# Patient Record
Sex: MALE | Race: WHITE | NOT HISPANIC OR LATINO | Employment: FULL TIME | URBAN - METROPOLITAN AREA
[De-identification: names, ages, dates, MRNs, and addresses within clinical notes are randomized per-mention and may not be internally consistent; named-entity substitution may affect disease eponyms.]

---

## 2018-04-30 ENCOUNTER — CLINICAL SUPPORT (OUTPATIENT)
Dept: URGENT CARE | Facility: CLINIC | Age: 43
End: 2018-04-30

## 2018-04-30 DIAGNOSIS — Z02.1 PRE-EMPLOYMENT EXAMINATION: Primary | ICD-10-CM

## 2018-04-30 PROCEDURE — 86580 TB INTRADERMAL TEST: CPT | Mod: S$GLB,,, | Performed by: EMERGENCY MEDICINE

## 2018-05-02 ENCOUNTER — CLINICAL SUPPORT (OUTPATIENT)
Dept: OCCUPATIONAL MEDICINE | Facility: CLINIC | Age: 43
End: 2018-05-02

## 2018-05-02 LAB
TB INDURATION - 48 HR READ: NORMAL MM
TB INDURATION - 72 HR READ: NORMAL MM
TB SKIN TEST - 48 HR READ: NEGATIVE
TB SKIN TEST - 72 HR READ: NORMAL

## 2018-12-11 ENCOUNTER — CLINICAL SUPPORT (OUTPATIENT)
Dept: URGENT CARE | Facility: CLINIC | Age: 43
End: 2018-12-11
Payer: COMMERCIAL

## 2018-12-11 DIAGNOSIS — Z11.1 SCREENING EXAMINATION FOR PULMONARY TUBERCULOSIS: Primary | ICD-10-CM

## 2018-12-11 PROCEDURE — 86580 TB INTRADERMAL TEST: CPT | Mod: S$GLB,,, | Performed by: PHYSICIAN ASSISTANT

## 2018-12-13 LAB
TB INDURATION - 48 HR READ: 0 MM
TB INDURATION - 72 HR READ: NORMAL MM
TB SKIN TEST - 48 HR READ: NEGATIVE
TB SKIN TEST - 72 HR READ: NORMAL

## 2019-06-08 ENCOUNTER — OFFICE VISIT (OUTPATIENT)
Dept: URGENT CARE | Facility: CLINIC | Age: 44
End: 2019-06-08
Payer: COMMERCIAL

## 2019-06-08 ENCOUNTER — CLINICAL SUPPORT (OUTPATIENT)
Dept: URGENT CARE | Facility: CLINIC | Age: 44
End: 2019-06-08

## 2019-06-08 VITALS
RESPIRATION RATE: 18 BRPM | OXYGEN SATURATION: 97 % | BODY MASS INDEX: 26.79 KG/M2 | DIASTOLIC BLOOD PRESSURE: 76 MMHG | HEIGHT: 76 IN | TEMPERATURE: 97 F | SYSTOLIC BLOOD PRESSURE: 115 MMHG | WEIGHT: 220 LBS | HEART RATE: 71 BPM

## 2019-06-08 DIAGNOSIS — J06.9 UPPER RESPIRATORY TRACT INFECTION, UNSPECIFIED TYPE: ICD-10-CM

## 2019-06-08 DIAGNOSIS — J30.89 ENVIRONMENTAL AND SEASONAL ALLERGIES: ICD-10-CM

## 2019-06-08 DIAGNOSIS — J32.9 RHINOSINUSITIS: Primary | ICD-10-CM

## 2019-06-08 DIAGNOSIS — R53.83 FATIGUE, UNSPECIFIED TYPE: Primary | ICD-10-CM

## 2019-06-08 DIAGNOSIS — R53.83 FATIGUE, UNSPECIFIED TYPE: ICD-10-CM

## 2019-06-08 PROCEDURE — 99214 PR OFFICE/OUTPT VISIT, EST, LEVL IV, 30-39 MIN: ICD-10-PCS | Mod: 25,S$GLB,, | Performed by: NURSE PRACTITIONER

## 2019-06-08 PROCEDURE — 96372 PR INJECTION,THERAP/PROPH/DIAG2ST, IM OR SUBCUT: ICD-10-PCS | Mod: S$GLB,,, | Performed by: FAMILY MEDICINE

## 2019-06-08 PROCEDURE — 96372 THER/PROPH/DIAG INJ SC/IM: CPT | Mod: S$GLB,,, | Performed by: NURSE PRACTITIONER

## 2019-06-08 PROCEDURE — 96372 PR INJECTION,THERAP/PROPH/DIAG2ST, IM OR SUBCUT: ICD-10-PCS | Mod: S$GLB,,, | Performed by: NURSE PRACTITIONER

## 2019-06-08 PROCEDURE — 96372 THER/PROPH/DIAG INJ SC/IM: CPT | Mod: S$GLB,,, | Performed by: FAMILY MEDICINE

## 2019-06-08 PROCEDURE — 99214 OFFICE O/P EST MOD 30 MIN: CPT | Mod: 25,S$GLB,, | Performed by: NURSE PRACTITIONER

## 2019-06-08 PROCEDURE — 3008F PR BODY MASS INDEX (BMI) DOCUMENTED: ICD-10-PCS | Mod: CPTII,S$GLB,, | Performed by: NURSE PRACTITIONER

## 2019-06-08 PROCEDURE — 3008F BODY MASS INDEX DOCD: CPT | Mod: CPTII,S$GLB,, | Performed by: NURSE PRACTITIONER

## 2019-06-08 RX ORDER — CYANOCOBALAMIN 1000 UG/ML
1000 INJECTION, SOLUTION INTRAMUSCULAR; SUBCUTANEOUS
Status: COMPLETED | OUTPATIENT
Start: 2019-06-08 | End: 2019-06-08

## 2019-06-08 RX ORDER — BETAMETHASONE SODIUM PHOSPHATE AND BETAMETHASONE ACETATE 3; 3 MG/ML; MG/ML
9 INJECTION, SUSPENSION INTRA-ARTICULAR; INTRALESIONAL; INTRAMUSCULAR; SOFT TISSUE
Status: COMPLETED | OUTPATIENT
Start: 2019-06-08 | End: 2019-06-08

## 2019-06-08 RX ORDER — LISINOPRIL 10 MG/1
TABLET ORAL
Refills: 2 | COMMUNITY
Start: 2019-05-02 | End: 2021-02-22

## 2019-06-08 RX ORDER — ROSUVASTATIN CALCIUM 10 MG/1
TABLET, COATED ORAL
Refills: 2 | COMMUNITY
Start: 2019-04-24 | End: 2021-02-22 | Stop reason: SDUPTHER

## 2019-06-08 RX ORDER — ESOMEPRAZOLE MAGNESIUM 40 MG/1
20 CAPSULE, DELAYED RELEASE ORAL
COMMUNITY
End: 2022-10-11

## 2019-06-08 RX ADMIN — BETAMETHASONE SODIUM PHOSPHATE AND BETAMETHASONE ACETATE 9 MG: 3; 3 INJECTION, SUSPENSION INTRA-ARTICULAR; INTRALESIONAL; INTRAMUSCULAR; SOFT TISSUE at 01:06

## 2019-06-08 RX ADMIN — CYANOCOBALAMIN 1000 MCG: 1000 INJECTION, SOLUTION INTRAMUSCULAR; SUBCUTANEOUS at 01:06

## 2019-06-08 NOTE — PROGRESS NOTES
"Subjective:       Patient ID: Alden Ledbetter is a 44 y.o. male.    Vitals:  height is 6' 4" (1.93 m) and weight is 99.8 kg (220 lb). His oral temperature is 97.2 °F (36.2 °C). His blood pressure is 115/76 and his pulse is 71. His respiration is 18 and oxygen saturation is 97%.     Chief Complaint: Sinus Problem    Patient complains of nasal congestion, ears clogged, postnasal drip since Thursday and feeling very low energy since that day.     Sinus Problem   This is a new problem. The current episode started in the past 7 days. The problem has been gradually worsening since onset. There has been no fever. He is experiencing no pain. Associated symptoms include congestion, a hoarse voice and a sore throat. Pertinent negatives include no chills, coughing, diaphoresis, ear pain, shortness of breath or sinus pressure. Treatments tried: flonase, zyrtec, tylenol. The treatment provided no relief.       Constitution: Positive for fatigue. Negative for chills, sweating and fever.   HENT: Positive for congestion, postnasal drip and sore throat. Negative for ear pain, sinus pain, sinus pressure and voice change.    Neck: Negative for painful lymph nodes.   Eyes: Negative for eye redness.   Respiratory: Negative for chest tightness, cough, sputum production, bloody sputum, COPD, shortness of breath, stridor, wheezing and asthma.    Gastrointestinal: Negative for nausea and vomiting.   Musculoskeletal: Negative for muscle ache.   Skin: Negative for rash.   Allergic/Immunologic: Negative for seasonal allergies and asthma.   Hematologic/Lymphatic: Negative for swollen lymph nodes.       Objective:      Physical Exam   Constitutional: He is oriented to person, place, and time. He appears well-developed and well-nourished. He is cooperative.  Non-toxic appearance. He does not have a sickly appearance. He does not appear ill. No distress.   HENT:   Head: Normocephalic and atraumatic.   Right Ear: Hearing, tympanic membrane, " external ear and ear canal normal.   Left Ear: Hearing, tympanic membrane, external ear and ear canal normal.   Nose: Mucosal edema present. No rhinorrhea or nasal deformity. No epistaxis. Right sinus exhibits no maxillary sinus tenderness and no frontal sinus tenderness. Left sinus exhibits no maxillary sinus tenderness and no frontal sinus tenderness.   Mouth/Throat: Uvula is midline and mucous membranes are normal. No trismus in the jaw. Normal dentition. No uvula swelling. Posterior oropharyngeal edema and posterior oropharyngeal erythema present. No oropharyngeal exudate. No tonsillar exudate.   Eyes: Conjunctivae and lids are normal. No scleral icterus.   Sclera clear bilat   Neck: Trachea normal, full passive range of motion without pain and phonation normal. Neck supple.   Cardiovascular: Normal rate, regular rhythm, normal heart sounds, intact distal pulses and normal pulses.   Pulmonary/Chest: Effort normal and breath sounds normal. No stridor. No respiratory distress. He has no decreased breath sounds. He has no wheezes. He has no rhonchi.   Abdominal: Soft. Normal appearance and bowel sounds are normal. He exhibits no distension. There is no tenderness.   Musculoskeletal: Normal range of motion. He exhibits no edema or deformity.        Lumbar back: He exhibits normal range of motion and no tenderness.   Lymphadenopathy:     He has cervical adenopathy.   Neurological: He is alert and oriented to person, place, and time. He exhibits normal muscle tone. Coordination normal.   Skin: Skin is warm, dry and intact. He is not diaphoretic. No pallor.   Psychiatric: He has a normal mood and affect. His speech is normal and behavior is normal. Judgment and thought content normal. Cognition and memory are normal.   Nursing note and vitals reviewed.      Assessment:       1. Rhinosinusitis    2. Upper respiratory tract infection, unspecified type    3. Environmental and seasonal allergies    4. Fatigue, unspecified  type        Plan:         Rhinosinusitis  -     betamethasone acetate-betamethasone sodium phosphate injection 9 mg    Upper respiratory tract infection, unspecified type    Environmental and seasonal allergies    Fatigue, unspecified type      Patient Instructions   Follow up with your doctor in a few days.  Return to the urgent care or go to the ER if symptoms get worse.    You received a steroid shot today - this can elevate your blood pressure, elevate your blood sugar, water weight gain, nervous energy, redness to the face and dimpling of the skin where the shot goes in.       TYLENOL EVERY 4 HOURS OR/AND MOTRIN 600MG EVERY 6 HOURS FOR THROAT PAIN/FEVER.    TAKE DAILY ANTIHISTAMINE WITH DECONGESTANT (CLARATIN D, ALLEGRA D, ZYRTEC D) FOR THE NEXT 7-10 DAYS    TAKE DAILY FLONASE AS DIRECTED FOR THE NEXT 7-10 DAYS FOR CONGESTION.    SEE HANDOUT ON SINUSITUS AND ALLERGIC RHINITIS.    IF SYMPTOMS FAIL TO IMPROVE IN THE NEXT 5-7 DAYS, OR IMPROVE AND THEN WORSEN, FOLLOW UP WITH A HEALTHCARE PROVIDER.        Sinusitis (No Antibiotics)    The sinuses are air-filled spaces within the bones of the face. They connect to the inside of the nose. Sinusitis is an inflammation of the tissue lining the sinus cavity. Sinus inflammation can occur during a cold. It can also be due to allergies to pollens and other particles in the air. It can cause symptoms such as sinus congestion, headache, sore throat, facial swelling and fullness. It may also cause a low-grade fever. No infection is present, and no antibiotic treatment is needed.  Home care  · Drink plenty of water, hot tea, and other liquids. This may help thin mucus. It also may promote sinus drainage.  · Heat may help soothe painful areas of the face. Use a towel soaked in hot water. Or,  the shower and direct the hot spray onto your face. Using a vaporizer along with a menthol rub at night may also help.   · An expectorant containing guaifenesin may help thin the  mucus and promote drainage from the sinuses.  · Over-the-counter decongestants may be used unless a similar medicine was prescribed. Nasal sprays work the fastest. Use one that contains phenylephrine or oxymetazoline. First blow the nose gently. Then use the spray. Do not use these medicines more often than directed on the label or symptoms may get worse. You may also use tablets containing pseudoephedrine. Avoid products that combine ingredients, because side effects may be increased. Read labels. You can also ask the pharmacist for help. (NOTE: Persons with high blood pressure should not use decongestants. They can raise blood pressure.)  · Over-the-counter antihistamines may help if allergies contributed to your sinusitis.    · Use acetaminophen or ibuprofen to control pain, unless another pain medicine was prescribed. (If you have chronic liver or kidney disease or ever had a stomach ulcer, talk with your doctor before using these medicines. Aspirin should never be used in anyone under 18 years of age who is ill with a fever. It may cause severe liver damage.)  · Use nasal rinses or irrigation as instructed by your health care provider.  · Don't smoke. This can worsen symptoms.  Follow-up care  Follow up with your healthcare provider or our staff if you are not improving within the next week.  When to seek medical advice  Call your healthcare provider if any of these occur:  · Green or yellow discharge from the nose or into the throat  · Facial pain or headache becoming more severe  · Stiff neck  · Unusual drowsiness or confusion  · Swelling of the forehead or eyelids  · Vision problems, including blurred or double vision  · Fever of 100.4ºF (38ºC) or higher, or as directed by your healthcare provider  · Seizure  · Breathing problems  · Symptoms not resolving within 10 days  Date Last Reviewed: 4/13/2015  © 2951-4243 [employer]. [address] [place] [address], [place] [address]. All rights reserved. This  information is not intended as a substitute for professional medical care. Always follow your healthcare professional's instructions.

## 2019-06-08 NOTE — PATIENT INSTRUCTIONS
Follow up with your doctor in a few days.  Return to the urgent care or go to the ER if symptoms get worse.    You received a steroid shot today - this can elevate your blood pressure, elevate your blood sugar, water weight gain, nervous energy, redness to the face and dimpling of the skin where the shot goes in.       TYLENOL EVERY 4 HOURS OR/AND MOTRIN 600MG EVERY 6 HOURS FOR THROAT PAIN/FEVER.    TAKE DAILY ANTIHISTAMINE WITH DECONGESTANT (CLARATIN D, ALLEGRA D, ZYRTEC D) FOR THE NEXT 7-10 DAYS    TAKE DAILY FLONASE AS DIRECTED FOR THE NEXT 7-10 DAYS FOR CONGESTION.    SEE HANDOUT ON SINUSITUS AND ALLERGIC RHINITIS.    IF SYMPTOMS FAIL TO IMPROVE IN THE NEXT 5-7 DAYS, OR IMPROVE AND THEN WORSEN, FOLLOW UP WITH A HEALTHCARE PROVIDER.        Sinusitis (No Antibiotics)    The sinuses are air-filled spaces within the bones of the face. They connect to the inside of the nose. Sinusitis is an inflammation of the tissue lining the sinus cavity. Sinus inflammation can occur during a cold. It can also be due to allergies to pollens and other particles in the air. It can cause symptoms such as sinus congestion, headache, sore throat, facial swelling and fullness. It may also cause a low-grade fever. No infection is present, and no antibiotic treatment is needed.  Home care  · Drink plenty of water, hot tea, and other liquids. This may help thin mucus. It also may promote sinus drainage.  · Heat may help soothe painful areas of the face. Use a towel soaked in hot water. Or,  the shower and direct the hot spray onto your face. Using a vaporizer along with a menthol rub at night may also help.   · An expectorant containing guaifenesin may help thin the mucus and promote drainage from the sinuses.  · Over-the-counter decongestants may be used unless a similar medicine was prescribed. Nasal sprays work the fastest. Use one that contains phenylephrine or oxymetazoline. First blow the nose gently. Then use the spray. Do  not use these medicines more often than directed on the label or symptoms may get worse. You may also use tablets containing pseudoephedrine. Avoid products that combine ingredients, because side effects may be increased. Read labels. You can also ask the pharmacist for help. (NOTE: Persons with high blood pressure should not use decongestants. They can raise blood pressure.)  · Over-the-counter antihistamines may help if allergies contributed to your sinusitis.    · Use acetaminophen or ibuprofen to control pain, unless another pain medicine was prescribed. (If you have chronic liver or kidney disease or ever had a stomach ulcer, talk with your doctor before using these medicines. Aspirin should never be used in anyone under 18 years of age who is ill with a fever. It may cause severe liver damage.)  · Use nasal rinses or irrigation as instructed by your health care provider.  · Don't smoke. This can worsen symptoms.  Follow-up care  Follow up with your healthcare provider or our staff if you are not improving within the next week.  When to seek medical advice  Call your healthcare provider if any of these occur:  · Green or yellow discharge from the nose or into the throat  · Facial pain or headache becoming more severe  · Stiff neck  · Unusual drowsiness or confusion  · Swelling of the forehead or eyelids  · Vision problems, including blurred or double vision  · Fever of 100.4ºF (38ºC) or higher, or as directed by your healthcare provider  · Seizure  · Breathing problems  · Symptoms not resolving within 10 days  Date Last Reviewed: 4/13/2015  © 9266-9050 [employer]. [address] [place] [address], [place] [address]. All rights reserved. This information is not intended as a substitute for professional medical care. Always follow your healthcare professional's instructions.

## 2019-06-11 ENCOUNTER — TELEPHONE (OUTPATIENT)
Dept: URGENT CARE | Facility: CLINIC | Age: 44
End: 2019-06-11

## 2019-10-01 ENCOUNTER — CLINICAL SUPPORT (OUTPATIENT)
Dept: URGENT CARE | Facility: CLINIC | Age: 44
End: 2019-10-01

## 2019-10-01 DIAGNOSIS — Z11.1 SCREENING EXAMINATION FOR PULMONARY TUBERCULOSIS: Primary | ICD-10-CM

## 2019-10-01 PROCEDURE — 86580 TB INTRADERMAL TEST: CPT | Mod: S$GLB,,, | Performed by: FAMILY MEDICINE

## 2019-10-01 PROCEDURE — 86580 POCT TB SKIN TEST: ICD-10-PCS | Mod: S$GLB,,, | Performed by: FAMILY MEDICINE

## 2019-10-04 LAB
TB INDURATION - 48 HR READ: NORMAL
TB INDURATION - 72 HR READ: 0 MM
TB SKIN TEST - 48 HR READ: NORMAL
TB SKIN TEST - 72 HR READ: NEGATIVE

## 2021-01-18 RX ORDER — TESTOSTERONE 30 MG/1.5ML
SOLUTION TOPICAL
Qty: 1 BOTTLE | Refills: 3 | Status: SHIPPED | OUTPATIENT
Start: 2021-01-18 | End: 2021-02-22 | Stop reason: SDUPTHER

## 2021-02-22 ENCOUNTER — OFFICE VISIT (OUTPATIENT)
Dept: FAMILY MEDICINE | Facility: CLINIC | Age: 46
End: 2021-02-22
Payer: COMMERCIAL

## 2021-02-22 VITALS
DIASTOLIC BLOOD PRESSURE: 80 MMHG | HEIGHT: 76 IN | BODY MASS INDEX: 29.06 KG/M2 | WEIGHT: 238.63 LBS | SYSTOLIC BLOOD PRESSURE: 118 MMHG

## 2021-02-22 DIAGNOSIS — Z00.00 WELLNESS EXAMINATION: Primary | ICD-10-CM

## 2021-02-22 DIAGNOSIS — E78.2 MIXED HYPERLIPIDEMIA: ICD-10-CM

## 2021-02-22 DIAGNOSIS — Z12.5 SCREENING PSA (PROSTATE SPECIFIC ANTIGEN): ICD-10-CM

## 2021-02-22 DIAGNOSIS — I10 ESSENTIAL HYPERTENSION: ICD-10-CM

## 2021-02-22 DIAGNOSIS — E29.1 MALE HYPOGONADISM: ICD-10-CM

## 2021-02-22 PROCEDURE — 99396 PREV VISIT EST AGE 40-64: CPT | Mod: S$GLB,,, | Performed by: INTERNAL MEDICINE

## 2021-02-22 PROCEDURE — 3008F BODY MASS INDEX DOCD: CPT | Mod: CPTII,S$GLB,, | Performed by: INTERNAL MEDICINE

## 2021-02-22 PROCEDURE — 3008F PR BODY MASS INDEX (BMI) DOCUMENTED: ICD-10-PCS | Mod: CPTII,S$GLB,, | Performed by: INTERNAL MEDICINE

## 2021-02-22 PROCEDURE — 99999 PR PBB SHADOW E&M-EST. PATIENT-LVL III: ICD-10-PCS | Mod: PBBFAC,,, | Performed by: INTERNAL MEDICINE

## 2021-02-22 PROCEDURE — 3079F PR MOST RECENT DIASTOLIC BLOOD PRESSURE 80-89 MM HG: ICD-10-PCS | Mod: CPTII,S$GLB,, | Performed by: INTERNAL MEDICINE

## 2021-02-22 PROCEDURE — 1126F PR PAIN SEVERITY QUANTIFIED, NO PAIN PRESENT: ICD-10-PCS | Mod: S$GLB,,, | Performed by: INTERNAL MEDICINE

## 2021-02-22 PROCEDURE — 99999 PR PBB SHADOW E&M-EST. PATIENT-LVL III: CPT | Mod: PBBFAC,,, | Performed by: INTERNAL MEDICINE

## 2021-02-22 PROCEDURE — 3074F SYST BP LT 130 MM HG: CPT | Mod: CPTII,S$GLB,, | Performed by: INTERNAL MEDICINE

## 2021-02-22 PROCEDURE — 99396 PR PREVENTIVE VISIT,EST,40-64: ICD-10-PCS | Mod: S$GLB,,, | Performed by: INTERNAL MEDICINE

## 2021-02-22 PROCEDURE — 1126F AMNT PAIN NOTED NONE PRSNT: CPT | Mod: S$GLB,,, | Performed by: INTERNAL MEDICINE

## 2021-02-22 PROCEDURE — 3074F PR MOST RECENT SYSTOLIC BLOOD PRESSURE < 130 MM HG: ICD-10-PCS | Mod: CPTII,S$GLB,, | Performed by: INTERNAL MEDICINE

## 2021-02-22 PROCEDURE — 3079F DIAST BP 80-89 MM HG: CPT | Mod: CPTII,S$GLB,, | Performed by: INTERNAL MEDICINE

## 2021-02-22 RX ORDER — AMLODIPINE AND OLMESARTAN MEDOXOMIL 5; 40 MG/1; MG/1
1 TABLET ORAL DAILY
COMMUNITY
Start: 2021-02-18 | End: 2022-02-14 | Stop reason: SDUPTHER

## 2021-02-22 RX ORDER — TESTOSTERONE 30 MG/1.5ML
SOLUTION TOPICAL
Qty: 1 BOTTLE | Refills: 5 | Status: SHIPPED | OUTPATIENT
Start: 2021-02-22 | End: 2021-08-20

## 2021-02-22 RX ORDER — ESCITALOPRAM OXALATE 5 MG/1
5 TABLET ORAL DAILY
COMMUNITY
Start: 2021-02-07 | End: 2021-02-22 | Stop reason: SDUPTHER

## 2021-02-22 RX ORDER — ROSUVASTATIN CALCIUM 10 MG/1
10 TABLET, COATED ORAL DAILY
Qty: 90 TABLET | Refills: 2 | Status: SHIPPED | OUTPATIENT
Start: 2021-02-22 | End: 2022-02-14 | Stop reason: SDUPTHER

## 2021-02-22 RX ORDER — ESCITALOPRAM OXALATE 5 MG/1
5 TABLET ORAL DAILY
Qty: 90 TABLET | Refills: 3 | Status: SHIPPED | OUTPATIENT
Start: 2021-02-22 | End: 2022-02-14 | Stop reason: SDUPTHER

## 2021-08-19 LAB
ALBUMIN SERPL-MCNC: 4.6 G/DL (ref 4–5)
ALBUMIN/GLOB SERPL: 1.8 {RATIO} (ref 1.2–2.2)
ALP SERPL-CCNC: 79 IU/L (ref 48–121)
ALT SERPL-CCNC: 24 IU/L (ref 0–44)
APPEARANCE UR: CLEAR
AST SERPL-CCNC: 20 IU/L (ref 0–40)
BASOPHILS # BLD AUTO: 0.1 X10E3/UL (ref 0–0.2)
BASOPHILS NFR BLD AUTO: 1 %
BILIRUB SERPL-MCNC: 0.4 MG/DL (ref 0–1.2)
BILIRUB UR QL STRIP: NEGATIVE
BUN SERPL-MCNC: 10 MG/DL (ref 6–24)
BUN/CREAT SERPL: 11 (ref 9–20)
CALCIUM SERPL-MCNC: 9 MG/DL (ref 8.7–10.2)
CHLORIDE SERPL-SCNC: 104 MMOL/L (ref 96–106)
CHOLEST SERPL-MCNC: 141 MG/DL (ref 100–199)
CO2 SERPL-SCNC: 25 MMOL/L (ref 20–29)
COLOR UR: YELLOW
CREAT SERPL-MCNC: 0.88 MG/DL (ref 0.76–1.27)
EOSINOPHIL # BLD AUTO: 0.2 X10E3/UL (ref 0–0.4)
EOSINOPHIL NFR BLD AUTO: 4 %
ERYTHROCYTE [DISTWIDTH] IN BLOOD BY AUTOMATED COUNT: 13 % (ref 11.6–15.4)
GLOBULIN SER CALC-MCNC: 2.5 G/DL (ref 1.5–4.5)
GLUCOSE SERPL-MCNC: 100 MG/DL (ref 65–99)
GLUCOSE UR QL: NEGATIVE
HCT VFR BLD AUTO: 44.9 % (ref 37.5–51)
HDLC SERPL-MCNC: 51 MG/DL
HGB BLD-MCNC: 15 G/DL (ref 13–17.7)
HGB UR QL STRIP: NEGATIVE
IMM GRANULOCYTES # BLD AUTO: 0 X10E3/UL (ref 0–0.1)
IMM GRANULOCYTES NFR BLD AUTO: 0 %
KETONES UR QL STRIP: NEGATIVE
LDLC SERPL CALC-MCNC: 78 MG/DL (ref 0–99)
LEUKOCYTE ESTERASE UR QL STRIP: NEGATIVE
LYMPHOCYTES # BLD AUTO: 1.8 X10E3/UL (ref 0.7–3.1)
LYMPHOCYTES NFR BLD AUTO: 32 %
MCH RBC QN AUTO: 29.9 PG (ref 26.6–33)
MCHC RBC AUTO-ENTMCNC: 33.4 G/DL (ref 31.5–35.7)
MCV RBC AUTO: 89 FL (ref 79–97)
MICRO URNS: NORMAL
MONOCYTES # BLD AUTO: 0.5 X10E3/UL (ref 0.1–0.9)
MONOCYTES NFR BLD AUTO: 9 %
NEUTROPHILS # BLD AUTO: 3.1 X10E3/UL (ref 1.4–7)
NEUTROPHILS NFR BLD AUTO: 54 %
NITRITE UR QL STRIP: NEGATIVE
PH UR STRIP: 7 [PH] (ref 5–7.5)
PLATELET # BLD AUTO: 222 X10E3/UL (ref 150–450)
POTASSIUM SERPL-SCNC: 4.5 MMOL/L (ref 3.5–5.2)
PROT SERPL-MCNC: 7.1 G/DL (ref 6–8.5)
PROT UR QL STRIP: NEGATIVE
PSA SERPL-MCNC: 0.9 NG/ML (ref 0–4)
RBC # BLD AUTO: 5.02 X10E6/UL (ref 4.14–5.8)
SODIUM SERPL-SCNC: 141 MMOL/L (ref 134–144)
SP GR UR: 1.02 (ref 1–1.03)
TESTOST FREE SERPL-MCNC: 5.7 PG/ML (ref 6.8–21.5)
TESTOST SERPL-MCNC: 194.4 NG/DL (ref 264–916)
TRIGL SERPL-MCNC: 54 MG/DL (ref 0–149)
UROBILINOGEN UR STRIP-MCNC: 0.2 MG/DL (ref 0.2–1)
VLDLC SERPL CALC-MCNC: 12 MG/DL (ref 5–40)
WBC # BLD AUTO: 5.6 X10E3/UL (ref 3.4–10.8)

## 2021-08-20 ENCOUNTER — OFFICE VISIT (OUTPATIENT)
Dept: FAMILY MEDICINE | Facility: CLINIC | Age: 46
End: 2021-08-20
Payer: COMMERCIAL

## 2021-08-20 VITALS
DIASTOLIC BLOOD PRESSURE: 78 MMHG | SYSTOLIC BLOOD PRESSURE: 116 MMHG | WEIGHT: 238 LBS | HEIGHT: 76 IN | BODY MASS INDEX: 28.98 KG/M2

## 2021-08-20 DIAGNOSIS — I10 ESSENTIAL HYPERTENSION: Primary | ICD-10-CM

## 2021-08-20 DIAGNOSIS — E29.1 MALE HYPOGONADISM: ICD-10-CM

## 2021-08-20 DIAGNOSIS — E78.2 MIXED HYPERLIPIDEMIA: ICD-10-CM

## 2021-08-20 PROCEDURE — 3008F PR BODY MASS INDEX (BMI) DOCUMENTED: ICD-10-PCS | Mod: CPTII,S$GLB,, | Performed by: INTERNAL MEDICINE

## 2021-08-20 PROCEDURE — 1159F PR MEDICATION LIST DOCUMENTED IN MEDICAL RECORD: ICD-10-PCS | Mod: CPTII,S$GLB,, | Performed by: INTERNAL MEDICINE

## 2021-08-20 PROCEDURE — 1126F PR PAIN SEVERITY QUANTIFIED, NO PAIN PRESENT: ICD-10-PCS | Mod: CPTII,S$GLB,, | Performed by: INTERNAL MEDICINE

## 2021-08-20 PROCEDURE — 3008F BODY MASS INDEX DOCD: CPT | Mod: CPTII,S$GLB,, | Performed by: INTERNAL MEDICINE

## 2021-08-20 PROCEDURE — 1160F RVW MEDS BY RX/DR IN RCRD: CPT | Mod: CPTII,S$GLB,, | Performed by: INTERNAL MEDICINE

## 2021-08-20 PROCEDURE — 1159F MED LIST DOCD IN RCRD: CPT | Mod: CPTII,S$GLB,, | Performed by: INTERNAL MEDICINE

## 2021-08-20 PROCEDURE — 99214 OFFICE O/P EST MOD 30 MIN: CPT | Mod: S$GLB,,, | Performed by: INTERNAL MEDICINE

## 2021-08-20 PROCEDURE — 99999 PR PBB SHADOW E&M-EST. PATIENT-LVL III: CPT | Mod: PBBFAC,,, | Performed by: INTERNAL MEDICINE

## 2021-08-20 PROCEDURE — 3074F SYST BP LT 130 MM HG: CPT | Mod: CPTII,S$GLB,, | Performed by: INTERNAL MEDICINE

## 2021-08-20 PROCEDURE — 3074F PR MOST RECENT SYSTOLIC BLOOD PRESSURE < 130 MM HG: ICD-10-PCS | Mod: CPTII,S$GLB,, | Performed by: INTERNAL MEDICINE

## 2021-08-20 PROCEDURE — 3078F PR MOST RECENT DIASTOLIC BLOOD PRESSURE < 80 MM HG: ICD-10-PCS | Mod: CPTII,S$GLB,, | Performed by: INTERNAL MEDICINE

## 2021-08-20 PROCEDURE — 1126F AMNT PAIN NOTED NONE PRSNT: CPT | Mod: CPTII,S$GLB,, | Performed by: INTERNAL MEDICINE

## 2021-08-20 PROCEDURE — 1160F PR REVIEW ALL MEDS BY PRESCRIBER/CLIN PHARMACIST DOCUMENTED: ICD-10-PCS | Mod: CPTII,S$GLB,, | Performed by: INTERNAL MEDICINE

## 2021-08-20 PROCEDURE — 3078F DIAST BP <80 MM HG: CPT | Mod: CPTII,S$GLB,, | Performed by: INTERNAL MEDICINE

## 2021-08-20 PROCEDURE — 99999 PR PBB SHADOW E&M-EST. PATIENT-LVL III: ICD-10-PCS | Mod: PBBFAC,,, | Performed by: INTERNAL MEDICINE

## 2021-08-20 PROCEDURE — 99214 PR OFFICE/OUTPT VISIT, EST, LEVL IV, 30-39 MIN: ICD-10-PCS | Mod: S$GLB,,, | Performed by: INTERNAL MEDICINE

## 2021-08-20 RX ORDER — TESTOSTERONE CYPIONATE 200 MG/ML
100 INJECTION, SOLUTION INTRAMUSCULAR
Qty: 10 ML | Refills: 1 | Status: SHIPPED | OUTPATIENT
Start: 2021-08-20 | End: 2022-01-18 | Stop reason: SDUPTHER

## 2021-08-20 RX ORDER — SYRINGE WITH NEEDLE, 1 ML 27GX1/2"
1 SYRINGE, EMPTY DISPOSABLE MISCELLANEOUS
Qty: 12 SYRINGE | Refills: 12 | Status: SHIPPED | OUTPATIENT
Start: 2021-08-20 | End: 2022-01-18 | Stop reason: SDUPTHER

## 2021-08-27 ENCOUNTER — TELEPHONE (OUTPATIENT)
Dept: FAMILY MEDICINE | Facility: CLINIC | Age: 46
End: 2021-08-27

## 2021-09-13 ENCOUNTER — TELEPHONE (OUTPATIENT)
Dept: FAMILY MEDICINE | Facility: CLINIC | Age: 46
End: 2021-09-13

## 2021-09-15 ENCOUNTER — TELEPHONE (OUTPATIENT)
Dept: FAMILY MEDICINE | Facility: CLINIC | Age: 46
End: 2021-09-15

## 2022-01-18 RX ORDER — SYRINGE WITH NEEDLE, 1 ML 27GX1/2"
1 SYRINGE, EMPTY DISPOSABLE MISCELLANEOUS
Qty: 12 EACH | Refills: 12 | Status: SHIPPED | OUTPATIENT
Start: 2022-01-18

## 2022-01-18 RX ORDER — TESTOSTERONE CYPIONATE 200 MG/ML
100 INJECTION, SOLUTION INTRAMUSCULAR
Qty: 2 ML | Refills: 0 | Status: SHIPPED | OUTPATIENT
Start: 2022-01-18 | End: 2022-02-09 | Stop reason: SDUPTHER

## 2022-01-18 NOTE — TELEPHONE ENCOUNTER
No new care gaps identified.  Powered by [employer]. Reference number: [id number].   1/18/2022 9:21:49 AM CST

## 2022-02-07 LAB
ALBUMIN SERPL-MCNC: 4.5 G/DL (ref 4–5)
ALBUMIN/GLOB SERPL: 1.7 {RATIO} (ref 1.2–2.2)
ALP SERPL-CCNC: 73 IU/L (ref 44–121)
ALT SERPL-CCNC: 21 IU/L (ref 0–44)
AST SERPL-CCNC: 25 IU/L (ref 0–40)
BASOPHILS # BLD AUTO: 0.1 X10E3/UL (ref 0–0.2)
BASOPHILS NFR BLD AUTO: 1 %
BILIRUB SERPL-MCNC: 0.5 MG/DL (ref 0–1.2)
BUN SERPL-MCNC: 13 MG/DL (ref 6–24)
BUN/CREAT SERPL: 13 (ref 9–20)
CALCIUM SERPL-MCNC: 9 MG/DL (ref 8.7–10.2)
CHLORIDE SERPL-SCNC: 104 MMOL/L (ref 96–106)
CHOLEST SERPL-MCNC: 134 MG/DL (ref 100–199)
CO2 SERPL-SCNC: 26 MMOL/L (ref 20–29)
CREAT SERPL-MCNC: 1.04 MG/DL (ref 0.76–1.27)
EOSINOPHIL # BLD AUTO: 0.4 X10E3/UL (ref 0–0.4)
EOSINOPHIL NFR BLD AUTO: 7 %
ERYTHROCYTE [DISTWIDTH] IN BLOOD BY AUTOMATED COUNT: 13.1 % (ref 11.6–15.4)
GLOBULIN SER CALC-MCNC: 2.7 G/DL (ref 1.5–4.5)
GLUCOSE SERPL-MCNC: 102 MG/DL (ref 65–99)
HCT VFR BLD AUTO: 45.1 % (ref 37.5–51)
HDLC SERPL-MCNC: 44 MG/DL
HGB BLD-MCNC: 13.7 G/DL (ref 13–17.7)
IMM GRANULOCYTES # BLD AUTO: 0 X10E3/UL (ref 0–0.1)
IMM GRANULOCYTES NFR BLD AUTO: 0 %
LDLC SERPL CALC-MCNC: 77 MG/DL (ref 0–99)
LYMPHOCYTES # BLD AUTO: 1.7 X10E3/UL (ref 0.7–3.1)
LYMPHOCYTES NFR BLD AUTO: 30 %
MCH RBC QN AUTO: 24.6 PG (ref 26.6–33)
MCHC RBC AUTO-ENTMCNC: 30.4 G/DL (ref 31.5–35.7)
MCV RBC AUTO: 81 FL (ref 79–97)
MONOCYTES # BLD AUTO: 0.7 X10E3/UL (ref 0.1–0.9)
MONOCYTES NFR BLD AUTO: 12 %
NEUTROPHILS # BLD AUTO: 2.8 X10E3/UL (ref 1.4–7)
NEUTROPHILS NFR BLD AUTO: 50 %
PLATELET # BLD AUTO: 252 X10E3/UL (ref 150–450)
POTASSIUM SERPL-SCNC: 5 MMOL/L (ref 3.5–5.2)
PROT SERPL-MCNC: 7.2 G/DL (ref 6–8.5)
RBC # BLD AUTO: 5.56 X10E6/UL (ref 4.14–5.8)
SODIUM SERPL-SCNC: 141 MMOL/L (ref 134–144)
TESTOST FREE SERPL-MCNC: 26.8 PG/ML (ref 6.8–21.5)
TESTOST SERPL-MCNC: 918.9 NG/DL (ref 264–916)
TRIGL SERPL-MCNC: 61 MG/DL (ref 0–149)
VLDLC SERPL CALC-MCNC: 13 MG/DL (ref 5–40)
WBC # BLD AUTO: 5.6 X10E3/UL (ref 3.4–10.8)

## 2022-02-09 ENCOUNTER — PATIENT MESSAGE (OUTPATIENT)
Dept: FAMILY MEDICINE | Facility: CLINIC | Age: 47
End: 2022-02-09
Payer: COMMERCIAL

## 2022-02-09 RX ORDER — TESTOSTERONE CYPIONATE 200 MG/ML
100 INJECTION, SOLUTION INTRAMUSCULAR
Qty: 2 ML | Refills: 0 | Status: SHIPPED | OUTPATIENT
Start: 2022-02-09 | End: 2022-02-14 | Stop reason: SDUPTHER

## 2022-02-09 NOTE — TELEPHONE ENCOUNTER
No new care gaps identified.  Powered by [employer]. Reference number: [id number].   2/09/2022 10:51:39 AM CST

## 2022-02-14 ENCOUNTER — OFFICE VISIT (OUTPATIENT)
Dept: FAMILY MEDICINE | Facility: CLINIC | Age: 47
End: 2022-02-14
Payer: COMMERCIAL

## 2022-02-14 VITALS
DIASTOLIC BLOOD PRESSURE: 82 MMHG | BODY MASS INDEX: 29.58 KG/M2 | SYSTOLIC BLOOD PRESSURE: 134 MMHG | HEIGHT: 76 IN | WEIGHT: 242.94 LBS

## 2022-02-14 DIAGNOSIS — Q23.1 BICUSPID AORTIC VALVE: ICD-10-CM

## 2022-02-14 DIAGNOSIS — E78.2 MIXED HYPERLIPIDEMIA: ICD-10-CM

## 2022-02-14 DIAGNOSIS — R73.02 GLUCOSE INTOLERANCE (IMPAIRED GLUCOSE TOLERANCE): ICD-10-CM

## 2022-02-14 DIAGNOSIS — Z12.5 SCREENING PSA (PROSTATE SPECIFIC ANTIGEN): ICD-10-CM

## 2022-02-14 DIAGNOSIS — Z00.00 WELLNESS EXAMINATION: Primary | ICD-10-CM

## 2022-02-14 DIAGNOSIS — E29.1 MALE HYPOGONADISM: ICD-10-CM

## 2022-02-14 DIAGNOSIS — R73.9 HYPERGLYCEMIA: ICD-10-CM

## 2022-02-14 DIAGNOSIS — I10 ESSENTIAL HYPERTENSION: ICD-10-CM

## 2022-02-14 PROBLEM — Q23.81 BICUSPID AORTIC VALVE: Status: ACTIVE | Noted: 2022-02-14

## 2022-02-14 PROCEDURE — 99396 PREV VISIT EST AGE 40-64: CPT | Mod: S$GLB,,, | Performed by: INTERNAL MEDICINE

## 2022-02-14 PROCEDURE — 3075F SYST BP GE 130 - 139MM HG: CPT | Mod: CPTII,S$GLB,, | Performed by: INTERNAL MEDICINE

## 2022-02-14 PROCEDURE — 99999 PR PBB SHADOW E&M-EST. PATIENT-LVL III: ICD-10-PCS | Mod: PBBFAC,,, | Performed by: INTERNAL MEDICINE

## 2022-02-14 PROCEDURE — 3008F PR BODY MASS INDEX (BMI) DOCUMENTED: ICD-10-PCS | Mod: CPTII,S$GLB,, | Performed by: INTERNAL MEDICINE

## 2022-02-14 PROCEDURE — 1159F PR MEDICATION LIST DOCUMENTED IN MEDICAL RECORD: ICD-10-PCS | Mod: CPTII,S$GLB,, | Performed by: INTERNAL MEDICINE

## 2022-02-14 PROCEDURE — 1159F MED LIST DOCD IN RCRD: CPT | Mod: CPTII,S$GLB,, | Performed by: INTERNAL MEDICINE

## 2022-02-14 PROCEDURE — 3079F DIAST BP 80-89 MM HG: CPT | Mod: CPTII,S$GLB,, | Performed by: INTERNAL MEDICINE

## 2022-02-14 PROCEDURE — 4010F PR ACE/ARB THEARPY RXD/TAKEN: ICD-10-PCS | Mod: CPTII,S$GLB,, | Performed by: INTERNAL MEDICINE

## 2022-02-14 PROCEDURE — 3008F BODY MASS INDEX DOCD: CPT | Mod: CPTII,S$GLB,, | Performed by: INTERNAL MEDICINE

## 2022-02-14 PROCEDURE — 99396 PR PREVENTIVE VISIT,EST,40-64: ICD-10-PCS | Mod: S$GLB,,, | Performed by: INTERNAL MEDICINE

## 2022-02-14 PROCEDURE — 4010F ACE/ARB THERAPY RXD/TAKEN: CPT | Mod: CPTII,S$GLB,, | Performed by: INTERNAL MEDICINE

## 2022-02-14 PROCEDURE — 3075F PR MOST RECENT SYSTOLIC BLOOD PRESS GE 130-139MM HG: ICD-10-PCS | Mod: CPTII,S$GLB,, | Performed by: INTERNAL MEDICINE

## 2022-02-14 PROCEDURE — 1160F RVW MEDS BY RX/DR IN RCRD: CPT | Mod: CPTII,S$GLB,, | Performed by: INTERNAL MEDICINE

## 2022-02-14 PROCEDURE — 3079F PR MOST RECENT DIASTOLIC BLOOD PRESSURE 80-89 MM HG: ICD-10-PCS | Mod: CPTII,S$GLB,, | Performed by: INTERNAL MEDICINE

## 2022-02-14 PROCEDURE — 99999 PR PBB SHADOW E&M-EST. PATIENT-LVL III: CPT | Mod: PBBFAC,,, | Performed by: INTERNAL MEDICINE

## 2022-02-14 PROCEDURE — 1160F PR REVIEW ALL MEDS BY PRESCRIBER/CLIN PHARMACIST DOCUMENTED: ICD-10-PCS | Mod: CPTII,S$GLB,, | Performed by: INTERNAL MEDICINE

## 2022-02-14 RX ORDER — TESTOSTERONE CYPIONATE 200 MG/ML
100 INJECTION, SOLUTION INTRAMUSCULAR
Qty: 10 ML | Refills: 1 | Status: SHIPPED | OUTPATIENT
Start: 2022-02-14 | End: 2022-06-29 | Stop reason: SDUPTHER

## 2022-02-14 RX ORDER — ESCITALOPRAM OXALATE 5 MG/1
5 TABLET ORAL DAILY
Qty: 90 TABLET | Refills: 3 | Status: SHIPPED | OUTPATIENT
Start: 2022-02-14 | End: 2022-09-28 | Stop reason: SDUPTHER

## 2022-02-14 RX ORDER — AMLODIPINE AND OLMESARTAN MEDOXOMIL 5; 40 MG/1; MG/1
1 TABLET ORAL DAILY
Qty: 90 TABLET | Refills: 3 | Status: ON HOLD | OUTPATIENT
Start: 2022-02-14 | End: 2022-09-16 | Stop reason: HOSPADM

## 2022-02-14 RX ORDER — ROSUVASTATIN CALCIUM 10 MG/1
10 TABLET, COATED ORAL DAILY
Qty: 90 TABLET | Refills: 3 | Status: SHIPPED | OUTPATIENT
Start: 2022-02-14 | End: 2022-05-05

## 2022-02-14 NOTE — PROGRESS NOTES
"Subjective:       Patient ID: Alden Ledbetter is a 46 y.o. male.    Chief Complaint: Follow-up and Annual Exam    HPI    PSA: 0.9 (8/2021  Colonoscopy: 7/2015 Dr Arzate wnl   Immunizations: Flu: yes Tdap: 2018   Covid: yes   Smoker: never      Follow-up  Pertinent negatives include no chest pain, chills or joint swelling.       Cardio:   Bicuspid aortic valve: found on routine echo.  Mgmt Dr Carlton   HTN: controlled Rx Tony 5/40   HLD: controlled Rx Crestor 10 // LDL 77   Low testosterone:  Change to testosterone cypionate injection 0.5 weekly.  Improved energy, less fatigue.  Is finding gaining weight.  Previously used Axiron 2 // T 918 1 day after shot.  Anxiety: controlled   Rx lexapro 5   GERD: controlled Rx Nexium 40  Glucose intolerance:  Glucose 102-- previous 100.  Gaining weight,   Is working out.  Has been eating more fruit.  Like a dietary consult.      Review of Systems:  Review of Systems   Constitutional: Negative for chills.   HENT: Negative for drooling.    Eyes: Negative for pain.   Respiratory: Negative for choking.    Cardiovascular: Negative for chest pain.   Gastrointestinal: Negative for blood in stool.   Genitourinary: Negative for hematuria.   Musculoskeletal: Negative for joint swelling.   Skin: Negative for pallor.   Neurological: Negative for facial asymmetry.   Psychiatric/Behavioral: Negative for confusion.       Objective:     Vitals:    02/14/22 0824   BP: 134/82   BP Location: Right arm   Weight: 110.2 kg (242 lb 15.2 oz)   Height: 6' 4" (1.93 m)          Physical Exam  Vitals reviewed.   Constitutional:       Appearance: Normal appearance.   HENT:      Head: Normocephalic and atraumatic.      Mouth/Throat:      Pharynx: Oropharynx is clear.   Eyes:      Extraocular Movements: Extraocular movements intact.      Conjunctiva/sclera: Conjunctivae normal.      Pupils: Pupils are equal, round, and reactive to light.   Cardiovascular:      Rate and Rhythm: Normal rate and regular rhythm.    " "  Heart sounds: Normal heart sounds.   Pulmonary:      Effort: Pulmonary effort is normal.      Breath sounds: Normal breath sounds.   Abdominal:      General: Bowel sounds are normal.      Palpations: Abdomen is soft.   Musculoskeletal:         General: Normal range of motion.      Cervical back: Normal range of motion and neck supple.   Skin:     General: Skin is warm and dry.   Neurological:      General: No focal deficit present.      Mental Status: He is alert and oriented to person, place, and time.   Psychiatric:         Mood and Affect: Mood normal.         Medication List with Changes/Refills   Current Medications    CETIRIZINE 10 MG CAP    Zyrtec 10 mg capsule   Take 1 capsule every day by oral route.    ESOMEPRAZOLE (NEXIUM) 40 MG CAPSULE    Take 40 mg by mouth before breakfast.    SYRINGE WITH NEEDLE 1 ML 25 GAUGE X 1" SYRG    Inject 1 Syringe as directed every 7 days. Disp 1 box   Changed and/or Refilled Medications    Modified Medication Previous Medication    AMLODIPINE-OLMESARTAN (FLO) 5-40 MG PER TABLET amlodipine-olmesartan (FLO) 5-40 mg per tablet       Take 1 tablet by mouth once daily.    Take 1 tablet by mouth once daily.    ESCITALOPRAM OXALATE (LEXAPRO) 5 MG TAB EScitalopram oxalate (LEXAPRO) 5 MG Tab       Take 1 tablet (5 mg total) by mouth once daily.    Take 1 tablet (5 mg total) by mouth once daily.    ROSUVASTATIN (CRESTOR) 10 MG TABLET rosuvastatin (CRESTOR) 10 MG tablet       Take 1 tablet (10 mg total) by mouth once daily.    Take 1 tablet (10 mg total) by mouth once daily.    TESTOSTERONE CYPIONATE (DEPOTESTOTERONE CYPIONATE) 200 MG/ML INJECTION testosterone cypionate (DEPOTESTOTERONE CYPIONATE) 200 mg/mL injection       Inject 0.5 mLs (100 mg total) into the muscle every 7 days.    Inject 0.5 mLs (100 mg total) into the muscle every 7 days. Due for visit and labs for refills       Assessment & Plan:  1. Wellness examination  - PSA, Screening; Future  - Hemoglobin A1C; Future  - " Comprehensive Metabolic Panel; Future  - CBC Auto Differential; Future  - PSA, Screening  - Hemoglobin A1C  - Comprehensive Metabolic Panel  - CBC Auto Differential    2. Glucose intolerance (impaired glucose tolerance)    3. Male hypogonadism  - CBC Auto Differential; Future  - CBC Auto Differential    4. Bicuspid aortic valve    5. Hyperglycemia  - Ambulatory referral/consult to Nutrition Services; Future  - Hemoglobin A1C; Future  - Comprehensive Metabolic Panel; Future  - Hemoglobin A1C  - Comprehensive Metabolic Panel    6. Essential hypertension    7. Mixed hyperlipidemia    8. Screening PSA (prostate specific antigen)  - PSA, Screening; Future  - PSA, Screening     Wellness examination  -     PSA, Screening; Future; Expected date: 02/14/2022  -     Hemoglobin A1C; Future; Expected date: 08/14/2022  -     Comprehensive Metabolic Panel; Future; Expected date: 02/14/2022  -     CBC Auto Differential; Future; Expected date: 02/14/2022    Glucose intolerance (impaired glucose tolerance)    Male hypogonadism  -     CBC Auto Differential; Future; Expected date: 02/14/2022    Bicuspid aortic valve    Hyperglycemia  -     Ambulatory referral/consult to Nutrition Services; Future; Expected date: 02/21/2022  -     Hemoglobin A1C; Future; Expected date: 08/14/2022  -     Comprehensive Metabolic Panel; Future; Expected date: 02/14/2022    Essential hypertension    Mixed hyperlipidemia    Screening PSA (prostate specific antigen)  -     PSA, Screening; Future; Expected date: 02/14/2022    Other orders  -     testosterone cypionate (DEPOTESTOTERONE CYPIONATE) 200 mg/mL injection; Inject 0.5 mLs (100 mg total) into the muscle every 7 days.  Dispense: 10 mL; Refill: 1  -     amlodipine-olmesartan (FLO) 5-40 mg per tablet; Take 1 tablet by mouth once daily.  Dispense: 90 tablet; Refill: 3  -     EScitalopram oxalate (LEXAPRO) 5 MG Tab; Take 1 tablet (5 mg total) by mouth once daily.  Dispense: 90 tablet; Refill: 3  -      rosuvastatin (CRESTOR) 10 MG tablet; Take 1 tablet (10 mg total) by mouth once daily.  Dispense: 90 tablet; Refill: 3        Continue to work on regular exercise, maintain healthy weight, balanced diet. Avoid unhealthy habits: smoking, excessive alcohol intake.

## 2022-05-05 RX ORDER — ROSUVASTATIN CALCIUM 10 MG/1
TABLET, COATED ORAL
Qty: 90 TABLET | Refills: 3 | Status: SHIPPED | OUTPATIENT
Start: 2022-05-05 | End: 2022-09-28 | Stop reason: SDUPTHER

## 2022-05-05 NOTE — TELEPHONE ENCOUNTER
Refill Authorization Note   Alden Ledbetter  is requesting a refill authorization.  Brief Assessment and Rationale for Refill:  Approve     Medication Therapy Plan:       Medication Reconciliation Completed: No   Comments:     No Care Gaps recommended.     Note composed:11:59 AM 05/05/2022

## 2022-05-05 NOTE — TELEPHONE ENCOUNTER
No new care gaps identified.  [facility] Embedded Care Gaps. Reference number: [id number]. 5/05/2022   5:54:45 AM CDT

## 2022-06-08 ENCOUNTER — PATIENT MESSAGE (OUTPATIENT)
Dept: FAMILY MEDICINE | Facility: CLINIC | Age: 47
End: 2022-06-08
Payer: COMMERCIAL

## 2022-06-08 ENCOUNTER — TELEPHONE (OUTPATIENT)
Dept: FAMILY MEDICINE | Facility: CLINIC | Age: 47
End: 2022-06-08
Payer: COMMERCIAL

## 2022-06-08 DIAGNOSIS — Z00.00 WELLNESS EXAMINATION: ICD-10-CM

## 2022-06-08 DIAGNOSIS — I10 ESSENTIAL HYPERTENSION: ICD-10-CM

## 2022-06-08 DIAGNOSIS — R73.02 GLUCOSE INTOLERANCE (IMPAIRED GLUCOSE TOLERANCE): ICD-10-CM

## 2022-06-08 DIAGNOSIS — E55.9 VITAMIN D DEFICIENCY: Primary | ICD-10-CM

## 2022-06-08 DIAGNOSIS — G62.9 NEUROPATHY: ICD-10-CM

## 2022-06-08 NOTE — TELEPHONE ENCOUNTER
"Spoke w/ pt. He states his glucose was slightly high in Jan. He states that he has been cutting out sugars and trying to eat better. He states that his "feet are on fire at night" for the last approx 2wk. He would like to be tested for DM. Pt has orders for CBC, CMP, PSA, HgbA1 for hist appt in Aug. Should he have these now or would you like to order other labs?  "

## 2022-06-08 NOTE — TELEPHONE ENCOUNTER
----- Message from Tracy Kruse sent at 6/8/2022 10:13 AM CDT -----  Type:  Sooner Appointment Request    Caller is requesting a sooner appointment.  Caller declined first available appointment listed below.  Caller will not accept being placed on the waitlist and is requesting a message be sent to doctor.    Name of Caller:  Alden  When is the first available appointment?  7/6  Symptoms:   wants his glucose checked and he said he has been having problems with his feet.    Best Call Back Number:  [phone]  Additional Information:  He would like to be seen tomorrow or Friday.  Do you want him to have labs?

## 2022-06-08 NOTE — TELEPHONE ENCOUNTER
Sent in new fasting labs to [facility] and add vit's to it - you can make him an appt w me when I get back since im booked before leaving

## 2022-06-11 LAB
25(OH)D3+25(OH)D2 SERPL-MCNC: 42.2 NG/ML (ref 30–100)
ALBUMIN SERPL-MCNC: 4.7 G/DL (ref 4–5)
ALBUMIN/GLOB SERPL: 2.2 {RATIO} (ref 1.2–2.2)
ALP SERPL-CCNC: 61 IU/L (ref 44–121)
ALT SERPL-CCNC: 22 IU/L (ref 0–44)
AST SERPL-CCNC: 24 IU/L (ref 0–40)
BASOPHILS # BLD AUTO: 0.1 X10E3/UL (ref 0–0.2)
BASOPHILS NFR BLD AUTO: 1 %
BILIRUB SERPL-MCNC: 0.4 MG/DL (ref 0–1.2)
BUN SERPL-MCNC: 14 MG/DL (ref 6–24)
BUN/CREAT SERPL: 14 (ref 9–20)
CALCIUM SERPL-MCNC: 8.8 MG/DL (ref 8.7–10.2)
CHLORIDE SERPL-SCNC: 103 MMOL/L (ref 96–106)
CO2 SERPL-SCNC: 24 MMOL/L (ref 20–29)
CREAT SERPL-MCNC: 0.98 MG/DL (ref 0.76–1.27)
EOSINOPHIL # BLD AUTO: 0.3 X10E3/UL (ref 0–0.4)
EOSINOPHIL NFR BLD AUTO: 6 %
ERYTHROCYTE [DISTWIDTH] IN BLOOD BY AUTOMATED COUNT: 15 % (ref 11.6–15.4)
EST. GFR  (NO RACE VARIABLE): 97 ML/MIN/1.73
FOLATE SERPL-MCNC: 12.9 NG/ML
GLOBULIN SER CALC-MCNC: 2.1 G/DL (ref 1.5–4.5)
GLUCOSE SERPL-MCNC: 91 MG/DL (ref 65–99)
HBA1C MFR BLD: 5.4 % (ref 4.8–5.6)
HCT VFR BLD AUTO: 41 % (ref 37.5–51)
HGB BLD-MCNC: 11.9 G/DL (ref 13–17.7)
IMM GRANULOCYTES # BLD AUTO: 0 X10E3/UL (ref 0–0.1)
IMM GRANULOCYTES NFR BLD AUTO: 0 %
LYMPHOCYTES # BLD AUTO: 1.6 X10E3/UL (ref 0.7–3.1)
LYMPHOCYTES NFR BLD AUTO: 29 %
MCH RBC QN AUTO: 23.1 PG (ref 26.6–33)
MCHC RBC AUTO-ENTMCNC: 29 G/DL (ref 31.5–35.7)
MCV RBC AUTO: 80 FL (ref 79–97)
MONOCYTES # BLD AUTO: 0.6 X10E3/UL (ref 0.1–0.9)
MONOCYTES NFR BLD AUTO: 10 %
NEUTROPHILS # BLD AUTO: 2.9 X10E3/UL (ref 1.4–7)
NEUTROPHILS NFR BLD AUTO: 54 %
PLATELET # BLD AUTO: 259 X10E3/UL (ref 150–450)
POTASSIUM SERPL-SCNC: 4.5 MMOL/L (ref 3.5–5.2)
PROT SERPL-MCNC: 6.8 G/DL (ref 6–8.5)
RBC # BLD AUTO: 5.15 X10E6/UL (ref 4.14–5.8)
SODIUM SERPL-SCNC: 140 MMOL/L (ref 134–144)
TSH SERPL DL<=0.005 MIU/L-ACNC: 1.37 UIU/ML (ref 0.45–4.5)
VIT B12 SERPL-MCNC: 442 PG/ML (ref 232–1245)
WBC # BLD AUTO: 5.5 X10E3/UL (ref 3.4–10.8)

## 2022-06-23 DIAGNOSIS — D64.9 ANEMIA, UNSPECIFIED TYPE: Primary | ICD-10-CM

## 2022-06-27 ENCOUNTER — PATIENT MESSAGE (OUTPATIENT)
Dept: FAMILY MEDICINE | Facility: CLINIC | Age: 47
End: 2022-06-27
Payer: COMMERCIAL

## 2022-06-27 DIAGNOSIS — D64.9 ANEMIA, UNSPECIFIED TYPE: Primary | ICD-10-CM

## 2022-06-29 NOTE — TELEPHONE ENCOUNTER
No new care gaps identified.  [facility] Embedded Care Gaps. Reference number: [id number]. 6/29/2022   11:30:53 AM MARYT

## 2022-06-29 NOTE — TELEPHONE ENCOUNTER
Refill pended for testosterone. Please review if due for labs at this time.  Last refill date 2/14/22, 10ml x 1R  Last lab: 2/22/22    Testosterone 264.0 - 916.0 ng/dL 918.9 High

## 2022-06-30 ENCOUNTER — PATIENT MESSAGE (OUTPATIENT)
Dept: FAMILY MEDICINE | Facility: CLINIC | Age: 47
End: 2022-06-30
Payer: COMMERCIAL

## 2022-06-30 DIAGNOSIS — E61.1 IRON DEFICIENCY: Primary | ICD-10-CM

## 2022-06-30 RX ORDER — TESTOSTERONE CYPIONATE 200 MG/ML
100 INJECTION, SOLUTION INTRAMUSCULAR
Qty: 10 ML | Refills: 1 | OUTPATIENT
Start: 2022-06-30 | End: 2022-12-29

## 2022-06-30 RX ORDER — TESTOSTERONE CYPIONATE 200 MG/ML
100 INJECTION, SOLUTION INTRAMUSCULAR
Qty: 10 ML | Refills: 0 | Status: SHIPPED | OUTPATIENT
Start: 2022-06-30 | End: 2022-09-19

## 2022-06-30 NOTE — TELEPHONE ENCOUNTER
No new care gaps identified.  [facility] Embedded Care Gaps. Reference number: [id number]. 6/30/2022   9:11:00 AM CDT

## 2022-07-01 LAB
IRON SATN MFR SERPL: 8 % (ref 15–55)
IRON SERPL-MCNC: 32 UG/DL (ref 38–169)
TIBC SERPL-MCNC: 416 UG/DL (ref 250–450)
UIBC SERPL-MCNC: 384 UG/DL (ref 111–343)

## 2022-07-29 ENCOUNTER — TELEPHONE (OUTPATIENT)
Dept: VASCULAR SURGERY | Facility: CLINIC | Age: 47
End: 2022-07-29
Payer: COMMERCIAL

## 2022-07-29 NOTE — TELEPHONE ENCOUNTER
----- Message from Jose Cruz Duckworth sent at 7/29/2022  1:39 PM CDT -----  Type:  Sooner Appointment Request    Caller is requesting a sooner appointment.  Caller declined first available appointment listed below.  Caller will not accept being placed on the waitlist and is requesting a message be sent to doctor.    Name of Caller:  Trisha/ [facility]  When is the first available appointment?  New Patient  Symptoms:  Consult for surgery  Best Call Back Number:  [phone]  Additional Information:

## 2022-07-29 NOTE — TELEPHONE ENCOUNTER
Informed that referral was received and I would have Dr. Martinez review and notify of recommendation. Verbalized understanding.

## 2022-08-01 ENCOUNTER — TELEPHONE (OUTPATIENT)
Dept: CARDIOTHORACIC SURGERY | Facility: CLINIC | Age: 47
End: 2022-08-01
Payer: COMMERCIAL

## 2022-08-01 NOTE — TELEPHONE ENCOUNTER
Spoke with pt who confirms he had a recent echo and CTA which he will bring on disk. Reviewed appointment details with pt. Pt verbalized understanding of all information.

## 2022-08-05 LAB — CRC RECOMMENDATION EXT: NORMAL

## 2022-08-09 ENCOUNTER — OFFICE VISIT (OUTPATIENT)
Dept: CARDIOTHORACIC SURGERY | Facility: CLINIC | Age: 47
End: 2022-08-09
Payer: COMMERCIAL

## 2022-08-09 ENCOUNTER — PATIENT MESSAGE (OUTPATIENT)
Dept: CARDIOLOGY | Facility: CLINIC | Age: 47
End: 2022-08-09
Payer: COMMERCIAL

## 2022-08-09 VITALS
TEMPERATURE: 98 F | WEIGHT: 237 LBS | HEIGHT: 76 IN | OXYGEN SATURATION: 100 % | HEART RATE: 69 BPM | SYSTOLIC BLOOD PRESSURE: 131 MMHG | DIASTOLIC BLOOD PRESSURE: 80 MMHG | BODY MASS INDEX: 28.86 KG/M2

## 2022-08-09 DIAGNOSIS — Q23.1 BICUSPID AORTIC VALVE: ICD-10-CM

## 2022-08-09 DIAGNOSIS — Q23.1 BICUSPID AORTIC VALVE: Primary | ICD-10-CM

## 2022-08-09 DIAGNOSIS — I71.20 THORACIC AORTIC ANEURYSM WITHOUT RUPTURE: Primary | ICD-10-CM

## 2022-08-09 DIAGNOSIS — I71.20 THORACIC AORTIC ANEURYSM WITHOUT RUPTURE: ICD-10-CM

## 2022-08-09 PROCEDURE — 4010F PR ACE/ARB THEARPY RXD/TAKEN: ICD-10-PCS | Mod: CPTII,S$GLB,, | Performed by: THORACIC SURGERY (CARDIOTHORACIC VASCULAR SURGERY)

## 2022-08-09 PROCEDURE — 99205 PR OFFICE/OUTPT VISIT, NEW, LEVL V, 60-74 MIN: ICD-10-PCS | Mod: S$GLB,,, | Performed by: THORACIC SURGERY (CARDIOTHORACIC VASCULAR SURGERY)

## 2022-08-09 PROCEDURE — 3044F PR MOST RECENT HEMOGLOBIN A1C LEVEL <7.0%: ICD-10-PCS | Mod: CPTII,S$GLB,, | Performed by: THORACIC SURGERY (CARDIOTHORACIC VASCULAR SURGERY)

## 2022-08-09 PROCEDURE — 3008F BODY MASS INDEX DOCD: CPT | Mod: CPTII,S$GLB,, | Performed by: THORACIC SURGERY (CARDIOTHORACIC VASCULAR SURGERY)

## 2022-08-09 PROCEDURE — 3079F PR MOST RECENT DIASTOLIC BLOOD PRESSURE 80-89 MM HG: ICD-10-PCS | Mod: CPTII,S$GLB,, | Performed by: THORACIC SURGERY (CARDIOTHORACIC VASCULAR SURGERY)

## 2022-08-09 PROCEDURE — 3044F HG A1C LEVEL LT 7.0%: CPT | Mod: CPTII,S$GLB,, | Performed by: THORACIC SURGERY (CARDIOTHORACIC VASCULAR SURGERY)

## 2022-08-09 PROCEDURE — 99205 OFFICE O/P NEW HI 60 MIN: CPT | Mod: S$GLB,,, | Performed by: THORACIC SURGERY (CARDIOTHORACIC VASCULAR SURGERY)

## 2022-08-09 PROCEDURE — 3075F PR MOST RECENT SYSTOLIC BLOOD PRESS GE 130-139MM HG: ICD-10-PCS | Mod: CPTII,S$GLB,, | Performed by: THORACIC SURGERY (CARDIOTHORACIC VASCULAR SURGERY)

## 2022-08-09 PROCEDURE — 99999 PR PBB SHADOW E&M-EST. PATIENT-LVL III: ICD-10-PCS | Mod: PBBFAC,,, | Performed by: THORACIC SURGERY (CARDIOTHORACIC VASCULAR SURGERY)

## 2022-08-09 PROCEDURE — 99999 PR PBB SHADOW E&M-EST. PATIENT-LVL III: CPT | Mod: PBBFAC,,, | Performed by: THORACIC SURGERY (CARDIOTHORACIC VASCULAR SURGERY)

## 2022-08-09 PROCEDURE — 4010F ACE/ARB THERAPY RXD/TAKEN: CPT | Mod: CPTII,S$GLB,, | Performed by: THORACIC SURGERY (CARDIOTHORACIC VASCULAR SURGERY)

## 2022-08-09 PROCEDURE — 3079F DIAST BP 80-89 MM HG: CPT | Mod: CPTII,S$GLB,, | Performed by: THORACIC SURGERY (CARDIOTHORACIC VASCULAR SURGERY)

## 2022-08-09 PROCEDURE — 3075F SYST BP GE 130 - 139MM HG: CPT | Mod: CPTII,S$GLB,, | Performed by: THORACIC SURGERY (CARDIOTHORACIC VASCULAR SURGERY)

## 2022-08-09 PROCEDURE — 3008F PR BODY MASS INDEX (BMI) DOCUMENTED: ICD-10-PCS | Mod: CPTII,S$GLB,, | Performed by: THORACIC SURGERY (CARDIOTHORACIC VASCULAR SURGERY)

## 2022-08-09 NOTE — LETTER
[facility] 2nd Fl  [address]  [place] [address]  Phone: [phone] August 9, 2022        Anthony Carlton MD  [address] [facility]  [place] [address]    Patient: Alden Ledbetter   MR Number: [id number]   YOB: 1975   Date of Visit: 8/9/2022     Dear Dr. Carlton:    I had the pleasure of seeing your patient, . Alden Ledbetter, in clinic today.  As you know, he is a very pleasant 47-year-old gentleman who has been followed for ascending aortic aneurysm as well as a bicuspid aortic valve.  He recently had a surveillance ECHO followed by a CTA which demonstrated that his ascending aorta is 5.3 cm in diameter.  Given the size of his aneurysm, I recommended surgical correction.  We had a good discussion about options for the bicuspid aortic valve, including valve sparing root replacement as well as valve replacement with either tissue or a mechanical prostheses.  We will plan to get this done for him sometime in the near future.      It was a pleasure to meet him.  When he does come to surgery, I will certainly keep you appraised of his progress.    Sincerely,          MD Iram Lazoosure

## 2022-08-09 NOTE — PROGRESS NOTES
"Subjective:      Patient ID: Alden Ledbetter is a 47 y.o. male.    Chief Complaint: No chief complaint on file.      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 Medical conditions include GERD, HLD, HTN, bicuspid AV with moderate AR. Patient states that he has regularly followed with a cardiologist due to his HTN and family history of heart disease. On an echo from 10/26/2020 a bicuspid AV was noted along with a TAA measuring ~4.3cm. A repeat echo from 7/6/22 noted the TAA at 5.6cm. A subsequent CT scan was ordered which revealed ~5.4 x 5.3cm TAA after echo showed a dilated aortic root. Patient denies any family history of TAA or connective tissue disorders. Reports that he is very healthy. Active and does CrossFit. Denies any SOB, fatigue, lower extremity swelling, dizziness, orthopnea, chest pain, or palpitations. No prior strokes, seizures, blood clots, stents, or sternotomies. No smoking history. Occasional drinker. Reports some anxiety since diagnosis. Blood pressure is well controlled, systolic usually low teens. Ready for surgery. Anemic on last labs but reports he frequently donates blood.     Current Outpatient Medications   Medication Instructions    amlodipine-olmesartan (FLO) 5-40 mg per tablet 1 tablet, Oral, Daily    cetirizine 10 mg Cap Zyrtec 10 mg capsule   Take 1 capsule every day by oral route.    EScitalopram oxalate (LEXAPRO) 5 mg, Oral, Daily    esomeprazole (NEXIUM) 40 mg, Oral, Before breakfast    rosuvastatin (CRESTOR) 10 MG tablet TAKE 1 TABLET(10 MG) BY MOUTH EVERY DAY    syringe with needle 1 mL 25 gauge x 1" Syrg 1 Syringe, Injection, Every 7 days, Disp 1 box    testosterone cypionate (DEPOTESTOTERONE CYPIONATE) 100 mg, Intramuscular, Every 7 days       Family and social history reviewed    Review of patient's allergies indicates:   Allergen Reactions    Dexlansoprazole     Prevacid " [lansoprazole]      Past Medical History:   Diagnosis Date    Aortic valve, bicuspid 2020    echo    GERD (gastroesophageal reflux disease)     Hyperlipidemia     Hypertension     Male hypogonadism      Past Surgical History:   Procedure Laterality Date    ELBOW SURGERY Right     NASAL SEPTUM SURGERY       Family History    None       Social History     Socioeconomic History    Marital status:     Number of children: 2   Occupational History    Occupation: fulltime    Tobacco Use    Smoking status: Never Smoker    Smokeless tobacco: Never Used   Substance and Sexual Activity    Alcohol use: Yes    Sexual activity: Yes     Partners: Female       Current medications Reviewed    Review of Systems   Constitutional: Negative for activity change and fatigue.   HENT: Negative for nosebleeds and tinnitus.    Eyes: Negative for visual disturbance.   Respiratory: Negative for shortness of breath.    Cardiovascular: Negative for chest pain, palpitations and leg swelling.   Gastrointestinal: Negative for nausea and vomiting.   Musculoskeletal: Negative for gait problem.   Skin: Negative for color change and pallor.   Neurological: Negative for dizziness, seizures, syncope and weakness.   Hematological: Does not bruise/bleed easily.   Psychiatric/Behavioral: Negative for sleep disturbance.     Objective:   Physical Exam  Vitals reviewed.   Constitutional:       General: He is not in acute distress.     Appearance: He is well-developed. He is not diaphoretic.   HENT:      Head: Normocephalic and atraumatic.   Eyes:      Pupils: Pupils are equal, round, and reactive to light.   Neck:      Vascular: No JVD.   Cardiovascular:      Rate and Rhythm: Normal rate.   Pulmonary:      Effort: Pulmonary effort is normal. No respiratory distress.   Abdominal:      General: There is no distension.   Musculoskeletal:         General: No swelling. Normal range of motion.      Cervical back: Normal range of motion.   Skin:      Coloration: Skin is not pale.   Neurological:      Mental Status: He is alert and oriented to person, place, and time.   Psychiatric:         Speech: Speech normal.         Behavior: Behavior normal.         Thought Content: Thought content normal.         Judgment: Judgment normal.         Diagnostic Results: reviewed   [facility] CTA 7/29/22  - prominence of the aortic root ~4.1cm   - fusiform aneurysmal dilation of the ascending TAA measuring 5.4 x 5.3 cm   - at the proximal aspect of the aortic arch the aorta measures 4cm   - at the distal aspect of the arch the aorta returns to normal caliber and remains normal in caliber through the descending aorta     [facility] TTE 7/6/22  - EF 60-65%  - normal RV size   - LA chamber size is normal   - bicuspid AV with moderate AR   - ANTOLIN 2.16, MG 11  - mild thickening of the MV anterior and posterior leaflets with mild MR   - mild TR   - RAP 3mmHg  - ascending aorta is moderately dilated at 5.6cm (previously 4.3cm on TTE 10/26/20)    Assessment:   TAA with bicuspid AV   Plan:     CTS Attending Note:    I have personally taken the history and examined this patient and agree with the YOKASAT's note as stated above.  Very pleasant 47-year-old gentleman who has been followed for an ascending aortic aneurysm and a bicuspid aortic valve.  He recently had a surveillance echo which demonstrated only mild to moderate aortic insufficiency, however it suggested an interval increase in the size of his aneurysm.  A CTA demonstrated a 5.3 cm ascending aortic aneurysm.  Both the sinuses of Valsalva and the proximal aortic arch (which is bovine) are involved.  The distal ascending aorta just proximal to the arch vessels is 4 cm.  I recommended ascending aortic replacement.  We discussed valve sparing root verses tissue versus mechanical prostheses.  He is considering his options.  He has a strong family history of coronary disease.  His father had bypass surgery at age 46.  We will plan for preoperative  diagnostic coronary angiography.

## 2022-08-10 ENCOUNTER — PATIENT MESSAGE (OUTPATIENT)
Dept: CARDIOLOGY | Facility: CLINIC | Age: 47
End: 2022-08-10
Payer: COMMERCIAL

## 2022-08-10 ENCOUNTER — EDUCATION (OUTPATIENT)
Dept: CARDIOLOGY | Facility: CLINIC | Age: 47
End: 2022-08-10
Payer: COMMERCIAL

## 2022-08-10 DIAGNOSIS — Q23.1 AORTIC REGURGITATION DUE TO BICUSPID AORTIC VALVE: Primary | ICD-10-CM

## 2022-08-10 DIAGNOSIS — I71.20 THORACIC AORTIC ANEURYSM WITHOUT RUPTURE: Primary | ICD-10-CM

## 2022-08-10 DIAGNOSIS — Z01.818 PRE-OPERATIVE EXAM: ICD-10-CM

## 2022-08-10 DIAGNOSIS — Q23.1 BICUSPID AORTIC VALVE: ICD-10-CM

## 2022-08-10 DIAGNOSIS — Z01.818 PRE-OP TESTING: ICD-10-CM

## 2022-08-10 RX ORDER — SODIUM CHLORIDE 9 MG/ML
INJECTION, SOLUTION INTRAVENOUS CONTINUOUS
Status: CANCELLED | OUTPATIENT
Start: 2022-08-10 | End: 2022-08-10

## 2022-08-10 RX ORDER — DIPHENHYDRAMINE HCL 50 MG
50 CAPSULE ORAL ONCE
Status: CANCELLED | OUTPATIENT
Start: 2022-08-10 | End: 2022-08-10

## 2022-08-10 NOTE — PROGRESS NOTES
OUTPATIENT CATHETERIZATION INSTRUCTIONS    You have been scheduled for a procedure in the catheterization lab on Wednesday, August 24, 2022.     Please report to the Cardiology Waiting Area on the Third floor of the hospital and check in at 8 AM.   You will then be taken to the SSCU (Short Stay Cardiac Unit) and prepared for your procedure. Please be aware that this is not the time of your procedure but the time you are to arrive. The procedures are scheduled on an hourly basis; however, emergency cases take precedence over all other cases.       1. No solid foods 8 hours prior to your procedure.  You may have clear liquids until the time of your admission which should be 2 hours prior to your procedure.  You are encouraged to drink at least 8 ounces of clear liquids prior to your admission to SSCU.  Patients with gastric emptying issues should be fasting for 6- 8 hours prior to the procedure.  Clear liquids include water, black coffee, clear juices, and performance drinks - no pulp or milk.    2. Heart failure or dialysis patients will be limited to 8 ounces (1 cup) of clear liquids up until 2 hours of the procedure.    3.   You may take your regular morning medications with water. If there are any medications that you should not take you will be instructed to hold them that morning. If you         are diabetic and on Metformin (Glucophage) do not take it the day before, the day of, and for 2 days after your procedure.  4.   If you are on Pradaxa, Eliquis or Coumadin , you can hold them 3 days prior to your procedure.      The procedure will take 1-2 hours to perform. After the procedure, you will return to SSCU on the third floor of the hospital. You will need to lie still (or keep your arm still) for the next 2 to 4 hours to minimize bleeding from the puncture site.  This time is determined by your physician.  Your family may remain in the room with you during this time.       You may be able to be discharged  "home that same afternoon if there is someone to drive you home and there are no complications.  Your doctor will determine, based on your progress, the date and time of your discharge. The results of your procedure will be discussed with you before you are discharged. Any further testing or procedures will be scheduled for you either before you leave or after your discharge..       If you should have any questions, concerns, or need to change the date of your procedure, please call GORDON Mendoza @ [phone]",    Special Instructions:          THE ABOVE INSTRUCTIONS WERE GIVEN TO THE PATIENT VERBALLY AND THEY VERBALIZED UNDERSTANDING.  THEY DO NOT REQUIRE ANY SPECIAL NEEDS AND DO NOT HAVE ANY LEARNING BARRIERS.          Directions for Reporting to Cardiology Waiting Area in the Hospital  If you park in the Parking Garage:  Take elevators to the1st floor of the parking garage.  Continue past the gift shop, coffee shop, and piano.  Take a right and go to the gold elevators. (Elevator B)  Take the elevator to the 3rd floor.  Follow the arrow on the sign on the wall that says Cath Lab Registration/EP Lab Registration.  Follow the long hallway all the way around until you come to a big open area.  This is the registration area.  Check in at Reception Desk.    OR    If family is dropping you off:  Have them drop you off at the front of the Hospital under the green overhang.  Enter through the doors and take a right.  Take the E elevators to the 3rd floor Cardiology Waiting Area.  Check in at the Reception Desk in the waiting room.              "

## 2022-08-16 ENCOUNTER — PATIENT OUTREACH (OUTPATIENT)
Dept: ADMINISTRATIVE | Facility: HOSPITAL | Age: 47
End: 2022-08-16
Payer: COMMERCIAL

## 2022-08-16 ENCOUNTER — TELEPHONE (OUTPATIENT)
Dept: CARDIOTHORACIC SURGERY | Facility: CLINIC | Age: 47
End: 2022-08-16
Payer: COMMERCIAL

## 2022-08-16 DIAGNOSIS — I71.20 THORACIC AORTIC ANEURYSM WITHOUT RUPTURE: Primary | ICD-10-CM

## 2022-08-16 NOTE — TELEPHONE ENCOUNTER
Called pt to review pre-op testing appointments for Thursday, 9/8. Also reviewed pt medications. Pt will need to take last dose of amlodipine-olmesartan on Thursday, 9/8. All other medication may be taken as usual. Will mail appointment slips. Pt verbalized understanding of all information.

## 2022-08-22 ENCOUNTER — OFFICE VISIT (OUTPATIENT)
Dept: CARDIOLOGY | Facility: CLINIC | Age: 47
End: 2022-08-22
Payer: COMMERCIAL

## 2022-08-22 ENCOUNTER — LAB VISIT (OUTPATIENT)
Dept: LAB | Facility: HOSPITAL | Age: 47
End: 2022-08-22
Attending: INTERNAL MEDICINE
Payer: COMMERCIAL

## 2022-08-22 VITALS
DIASTOLIC BLOOD PRESSURE: 83 MMHG | HEART RATE: 56 BPM | HEIGHT: 76 IN | SYSTOLIC BLOOD PRESSURE: 122 MMHG | OXYGEN SATURATION: 98 % | BODY MASS INDEX: 29.23 KG/M2 | WEIGHT: 240.06 LBS

## 2022-08-22 DIAGNOSIS — Q23.1 BICUSPID AORTIC VALVE: ICD-10-CM

## 2022-08-22 DIAGNOSIS — Q23.1 AORTIC REGURGITATION DUE TO BICUSPID AORTIC VALVE: ICD-10-CM

## 2022-08-22 DIAGNOSIS — I10 ESSENTIAL HYPERTENSION: ICD-10-CM

## 2022-08-22 DIAGNOSIS — I71.20 THORACIC AORTIC ANEURYSM WITHOUT RUPTURE: ICD-10-CM

## 2022-08-22 LAB
ABO + RH BLD: NORMAL
ANION GAP SERPL CALC-SCNC: 7 MMOL/L (ref 8–16)
APTT BLDCRRT: 24.5 SEC (ref 21–32)
BLD GP AB SCN CELLS X3 SERPL QL: NORMAL
BUN SERPL-MCNC: 10 MG/DL (ref 6–20)
CALCIUM SERPL-MCNC: 9 MG/DL (ref 8.7–10.5)
CHLORIDE SERPL-SCNC: 106 MMOL/L (ref 95–110)
CO2 SERPL-SCNC: 28 MMOL/L (ref 23–29)
CREAT SERPL-MCNC: 0.9 MG/DL (ref 0.5–1.4)
ERYTHROCYTE [DISTWIDTH] IN BLOOD BY AUTOMATED COUNT: 17.6 % (ref 11.5–14.5)
EST. GFR  (NO RACE VARIABLE): >60 ML/MIN/1.73 M^2
GLUCOSE SERPL-MCNC: 95 MG/DL (ref 70–110)
HCT VFR BLD AUTO: 42.1 % (ref 40–54)
HGB BLD-MCNC: 12.9 G/DL (ref 14–18)
INR PPP: 1 (ref 0.8–1.2)
MCH RBC QN AUTO: 23.8 PG (ref 27–31)
MCHC RBC AUTO-ENTMCNC: 30.6 G/DL (ref 32–36)
MCV RBC AUTO: 78 FL (ref 82–98)
PLATELET # BLD AUTO: 265 K/UL (ref 150–450)
PMV BLD AUTO: 9.8 FL (ref 9.2–12.9)
POTASSIUM SERPL-SCNC: 4.5 MMOL/L (ref 3.5–5.1)
PROTHROMBIN TIME: 10.8 SEC (ref 9–12.5)
RBC # BLD AUTO: 5.41 M/UL (ref 4.6–6.2)
SODIUM SERPL-SCNC: 141 MMOL/L (ref 136–145)
WBC # BLD AUTO: 5.9 K/UL (ref 3.9–12.7)

## 2022-08-22 PROCEDURE — 4010F ACE/ARB THERAPY RXD/TAKEN: CPT | Mod: CPTII,S$GLB,, | Performed by: INTERNAL MEDICINE

## 2022-08-22 PROCEDURE — 4010F PR ACE/ARB THEARPY RXD/TAKEN: ICD-10-PCS | Mod: CPTII,S$GLB,, | Performed by: INTERNAL MEDICINE

## 2022-08-22 PROCEDURE — 3079F PR MOST RECENT DIASTOLIC BLOOD PRESSURE 80-89 MM HG: ICD-10-PCS | Mod: CPTII,S$GLB,, | Performed by: INTERNAL MEDICINE

## 2022-08-22 PROCEDURE — 99204 OFFICE O/P NEW MOD 45 MIN: CPT | Mod: S$GLB,,, | Performed by: INTERNAL MEDICINE

## 2022-08-22 PROCEDURE — 3008F BODY MASS INDEX DOCD: CPT | Mod: CPTII,S$GLB,, | Performed by: INTERNAL MEDICINE

## 2022-08-22 PROCEDURE — 3074F PR MOST RECENT SYSTOLIC BLOOD PRESSURE < 130 MM HG: ICD-10-PCS | Mod: CPTII,S$GLB,, | Performed by: INTERNAL MEDICINE

## 2022-08-22 PROCEDURE — 99204 PR OFFICE/OUTPT VISIT, NEW, LEVL IV, 45-59 MIN: ICD-10-PCS | Mod: S$GLB,,, | Performed by: INTERNAL MEDICINE

## 2022-08-22 PROCEDURE — 85610 PROTHROMBIN TIME: CPT | Performed by: INTERNAL MEDICINE

## 2022-08-22 PROCEDURE — 99999 PR PBB SHADOW E&M-EST. PATIENT-LVL IV: ICD-10-PCS | Mod: PBBFAC,,, | Performed by: INTERNAL MEDICINE

## 2022-08-22 PROCEDURE — 1159F PR MEDICATION LIST DOCUMENTED IN MEDICAL RECORD: ICD-10-PCS | Mod: CPTII,S$GLB,, | Performed by: INTERNAL MEDICINE

## 2022-08-22 PROCEDURE — 3008F PR BODY MASS INDEX (BMI) DOCUMENTED: ICD-10-PCS | Mod: CPTII,S$GLB,, | Performed by: INTERNAL MEDICINE

## 2022-08-22 PROCEDURE — 3074F SYST BP LT 130 MM HG: CPT | Mod: CPTII,S$GLB,, | Performed by: INTERNAL MEDICINE

## 2022-08-22 PROCEDURE — 85730 THROMBOPLASTIN TIME PARTIAL: CPT | Performed by: INTERNAL MEDICINE

## 2022-08-22 PROCEDURE — 3044F PR MOST RECENT HEMOGLOBIN A1C LEVEL <7.0%: ICD-10-PCS | Mod: CPTII,S$GLB,, | Performed by: INTERNAL MEDICINE

## 2022-08-22 PROCEDURE — 80048 BASIC METABOLIC PNL TOTAL CA: CPT | Performed by: INTERNAL MEDICINE

## 2022-08-22 PROCEDURE — 86901 BLOOD TYPING SEROLOGIC RH(D): CPT | Performed by: INTERNAL MEDICINE

## 2022-08-22 PROCEDURE — 99999 PR PBB SHADOW E&M-EST. PATIENT-LVL IV: CPT | Mod: PBBFAC,,, | Performed by: INTERNAL MEDICINE

## 2022-08-22 PROCEDURE — 1159F MED LIST DOCD IN RCRD: CPT | Mod: CPTII,S$GLB,, | Performed by: INTERNAL MEDICINE

## 2022-08-22 PROCEDURE — 3079F DIAST BP 80-89 MM HG: CPT | Mod: CPTII,S$GLB,, | Performed by: INTERNAL MEDICINE

## 2022-08-22 PROCEDURE — 85027 COMPLETE CBC AUTOMATED: CPT | Performed by: INTERNAL MEDICINE

## 2022-08-22 PROCEDURE — 3044F HG A1C LEVEL LT 7.0%: CPT | Mod: CPTII,S$GLB,, | Performed by: INTERNAL MEDICINE

## 2022-08-22 NOTE — H&P (VIEW-ONLY)
Subjective:    Patient ID:  Alden Ledbetter is a 47 y.o. male who presents for diagnostic angiogram prior to Bental procedure.    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 Medical conditions include GERD, HLD, HTN, bicuspid AV with moderate AR. He is scheduled for [place] with Dr Boudreaux. Patient states that he has regularly followed with a cardiologist due to his HTN and family history of heart disease. On an echo from 10/26/2020 a bicuspid AV was noted along with a TAA measuring ~4.3cm. A repeat echo from 7/6/22 noted the TAA at 5.6cm. A subsequent CT scan was ordered which revealed ~5.4 x 5.3cm TAA after echo showed a dilated aortic root. Patient denies any family history of TAA or connective tissue disorders. Reports that he is very healthy. Active and does CrossFit. Denies any SOB, fatigue, lower extremity swelling, dizziness, orthopnea, chest pain, or palpitations. No prior strokes, seizures, blood clots, stents, or sternotomies. No smoking history. Occasional drinker.       NYHA: I CCS: 0    Review of Systems   Constitutional: Negative for chills and fever.   HENT: Negative for sore throat.    Eyes: Negative for blurred vision.   Cardiovascular: Negative for chest pain, claudication, cyanosis, dyspnea on exertion, irregular heartbeat, leg swelling, near-syncope, orthopnea, palpitations, paroxysmal nocturnal dyspnea and syncope.   Respiratory: Negative for cough and sputum production.    Hematologic/Lymphatic: Does not bruise/bleed easily.   Skin: Negative for itching, rash and suspicious lesions.   Musculoskeletal: Negative for falls.   Gastrointestinal: Negative for abdominal pain and change in bowel habit.   Genitourinary: Negative for dysuria.   Neurological: Negative for disturbances in coordination, dizziness and loss of balance.   Psychiatric/Behavioral: Negative for  "altered mental status.          Past Medical History:   Diagnosis Date    Aortic valve, bicuspid 2020    echo    GERD (gastroesophageal reflux disease)     Hyperlipidemia     Hypertension     Male hypogonadism        Current Outpatient Medications:     amlodipine-olmesartan (FLO) 5-40 mg per tablet, Take 1 tablet by mouth once daily., Disp: 90 tablet, Rfl: 3    cetirizine 10 mg Cap, Zyrtec 10 mg capsule  Take 1 capsule every day by oral route., Disp: , Rfl:     EScitalopram oxalate (LEXAPRO) 5 MG Tab, Take 1 tablet (5 mg total) by mouth once daily., Disp: 90 tablet, Rfl: 3    esomeprazole (NEXIUM) 40 MG capsule, Take 20 mg by mouth before breakfast., Disp: , Rfl:     rosuvastatin (CRESTOR) 10 MG tablet, TAKE 1 TABLET(10 MG) BY MOUTH EVERY DAY, Disp: 90 tablet, Rfl: 3    syringe with needle 1 mL 25 gauge x 1" Syrg, Inject 1 Syringe as directed every 7 days. Disp 1 box, Disp: 12 each, Rfl: 12    testosterone cypionate (DEPOTESTOTERONE CYPIONATE) 200 mg/mL injection, Inject 0.5 mLs (100 mg total) into the muscle every 7 days., Disp: 10 mL, Rfl: 0    Objective:    Physical Exam  Vitals reviewed.   Constitutional:       General: He is not in acute distress.     Appearance: He is well-developed. He is not diaphoretic.   HENT:      Head: Normocephalic and atraumatic.   Neck:      Vascular: No JVD.   Cardiovascular:      Rate and Rhythm: Normal rate and regular rhythm.      Pulses: Intact distal pulses.      Heart sounds: Murmur heard.   High-pitched blowing decrescendo early diastolic murmur is present at the upper right sternal border radiating to the apex.  Pulmonary:      Effort: Pulmonary effort is normal. No respiratory distress.      Breath sounds: Normal breath sounds.   Musculoskeletal:      Cervical back: Normal range of motion.      Right lower leg: No edema.      Left lower leg: No edema.   Skin:     General: Skin is warm and dry.   Neurological:      Mental Status: He is alert and oriented to " "person, place, and time.             Vitals:    08/22/22 0952 08/22/22 0955   BP: 136/84 122/83   BP Location: Right arm Left arm   Patient Position: Sitting Sitting   BP Method: Large (Automatic) Large (Automatic)   Pulse: (!) 56 (!) 56   SpO2: 98%    Weight: 108.9 kg (240 lb 1.3 oz)    Height: 6' 4" (1.93 m)      Body mass index is 29.22 kg/m².    Test(s) Reviewed  I have reviewed the following in detail:  [] Stress test   [] Angiography   [] Echocardiogram   [] Labs   [] Other       Chemistry        Component Value Date/Time     06/10/2022 0810    K 4.5 06/10/2022 0810     06/10/2022 0810    CO2 24 06/10/2022 0810    BUN 14 06/10/2022 0810    CREATININE 0.98 06/10/2022 0810    GLU 91 06/10/2022 0810        Component Value Date/Time    CALCIUM 8.8 06/10/2022 0810    AST 24 06/10/2022 0810    ALT 22 06/10/2022 0810    BILITOT 0.4 06/10/2022 0810    EGFRNONAA 87 02/04/2022 0721            Assessment:   Aortic regurgitation due to bicuspid aortic valve  Plans for valve sparing root replacement vs mechanical valve replacement with Dr Boudreaux. Proceed with diagnostic angiogram.     Thoracic aortic aneurysm (TAA)  Increasing TAA last measured around 5.4 cm x 5.3 cm. Proceed with diagnostic angiogram prior to surgery.     Essential hypertension  Chronic. Controlled. Follow up with Cardiology/PCP.    Plan:     -Will proceed with diagnostic coronary angiogram   - Anti-platelet Therapy: none  - Access: right femoral  - Creatinine/CrCl: in process  - Allergies: no contrast allergy  - All patient's questions were answered.  -The risks, benefits and alternatives of the procedure were explained to the patient.   -The risks of coronary angiography include but are not limited to: bleeding, infection, heart rhythm abnormalities, allergic reactions, kidney injury and potential need for dialysis, stroke and death.   - Should stenting be indicated, the patient has agreed to dual anti-platelet therapy for 1-consecutive " year with a drug-eluting stent and a minimum of 1-month with the use of a bare metal stent  - Additionally, pt is aware that non-compliance is likely to result in stent clotting with heart attack, heart failure, and/or death  -The risks of moderate sedation include hypotension, respiratory depression, arrhythmias, bronchospasm, and death.   - Informed consent was obtained and the  patient is agreeable to proceed with the procedure.             Montserrat Denis PA-C  Valve and Structural Heart Disease  [facility]      Staff:  I have personally taken the history and examined this patient and agree with the fellow's note as stated above and amended it accordingly :-)  Will do RCFA acccess, 6Fr, with 6Fr AL III and AL I diagnostic catheters for diagnostic coronary angiography prior to Bental Surgery. No DAP.  No ASA. Perclose closure.

## 2022-08-22 NOTE — ASSESSMENT & PLAN NOTE
Increasing TAA last measured around 5.4 cm x 5.3 cm. Proceed with diagnostic angiogram prior to surgery.

## 2022-08-22 NOTE — PROGRESS NOTES
Subjective:    Patient ID:  Alden Ledbetter is a 47 y.o. male who presents for diagnostic angiogram prior to Bental procedure.    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 Medical conditions include GERD, HLD, HTN, bicuspid AV with moderate AR. He is scheduled for [place] with Dr Boudreaux. Patient states that he has regularly followed with a cardiologist due to his HTN and family history of heart disease. On an echo from 10/26/2020 a bicuspid AV was noted along with a TAA measuring ~4.3cm. A repeat echo from 7/6/22 noted the TAA at 5.6cm. A subsequent CT scan was ordered which revealed ~5.4 x 5.3cm TAA after echo showed a dilated aortic root. Patient denies any family history of TAA or connective tissue disorders. Reports that he is very healthy. Active and does CrossFit. Denies any SOB, fatigue, lower extremity swelling, dizziness, orthopnea, chest pain, or palpitations. No prior strokes, seizures, blood clots, stents, or sternotomies. No smoking history. Occasional drinker.       NYHA: I CCS: 0    Review of Systems   Constitutional: Negative for chills and fever.   HENT: Negative for sore throat.    Eyes: Negative for blurred vision.   Cardiovascular: Negative for chest pain, claudication, cyanosis, dyspnea on exertion, irregular heartbeat, leg swelling, near-syncope, orthopnea, palpitations, paroxysmal nocturnal dyspnea and syncope.   Respiratory: Negative for cough and sputum production.    Hematologic/Lymphatic: Does not bruise/bleed easily.   Skin: Negative for itching, rash and suspicious lesions.   Musculoskeletal: Negative for falls.   Gastrointestinal: Negative for abdominal pain and change in bowel habit.   Genitourinary: Negative for dysuria.   Neurological: Negative for disturbances in coordination, dizziness and loss of balance.   Psychiatric/Behavioral: Negative for  "altered mental status.          Past Medical History:   Diagnosis Date    Aortic valve, bicuspid 2020    echo    GERD (gastroesophageal reflux disease)     Hyperlipidemia     Hypertension     Male hypogonadism        Current Outpatient Medications:     amlodipine-olmesartan (FLO) 5-40 mg per tablet, Take 1 tablet by mouth once daily., Disp: 90 tablet, Rfl: 3    cetirizine 10 mg Cap, Zyrtec 10 mg capsule  Take 1 capsule every day by oral route., Disp: , Rfl:     EScitalopram oxalate (LEXAPRO) 5 MG Tab, Take 1 tablet (5 mg total) by mouth once daily., Disp: 90 tablet, Rfl: 3    esomeprazole (NEXIUM) 40 MG capsule, Take 20 mg by mouth before breakfast., Disp: , Rfl:     rosuvastatin (CRESTOR) 10 MG tablet, TAKE 1 TABLET(10 MG) BY MOUTH EVERY DAY, Disp: 90 tablet, Rfl: 3    syringe with needle 1 mL 25 gauge x 1" Syrg, Inject 1 Syringe as directed every 7 days. Disp 1 box, Disp: 12 each, Rfl: 12    testosterone cypionate (DEPOTESTOTERONE CYPIONATE) 200 mg/mL injection, Inject 0.5 mLs (100 mg total) into the muscle every 7 days., Disp: 10 mL, Rfl: 0    Objective:    Physical Exam  Vitals reviewed.   Constitutional:       General: He is not in acute distress.     Appearance: He is well-developed. He is not diaphoretic.   HENT:      Head: Normocephalic and atraumatic.   Neck:      Vascular: No JVD.   Cardiovascular:      Rate and Rhythm: Normal rate and regular rhythm.      Pulses: Intact distal pulses.      Heart sounds: Murmur heard.   High-pitched blowing decrescendo early diastolic murmur is present at the upper right sternal border radiating to the apex.  Pulmonary:      Effort: Pulmonary effort is normal. No respiratory distress.      Breath sounds: Normal breath sounds.   Musculoskeletal:      Cervical back: Normal range of motion.      Right lower leg: No edema.      Left lower leg: No edema.   Skin:     General: Skin is warm and dry.   Neurological:      Mental Status: He is alert and oriented to " "person, place, and time.             Vitals:    08/22/22 0952 08/22/22 0955   BP: 136/84 122/83   BP Location: Right arm Left arm   Patient Position: Sitting Sitting   BP Method: Large (Automatic) Large (Automatic)   Pulse: (!) 56 (!) 56   SpO2: 98%    Weight: 108.9 kg (240 lb 1.3 oz)    Height: 6' 4" (1.93 m)      Body mass index is 29.22 kg/m².    Test(s) Reviewed  I have reviewed the following in detail:  [] Stress test   [] Angiography   [] Echocardiogram   [] Labs   [] Other       Chemistry        Component Value Date/Time     06/10/2022 0810    K 4.5 06/10/2022 0810     06/10/2022 0810    CO2 24 06/10/2022 0810    BUN 14 06/10/2022 0810    CREATININE 0.98 06/10/2022 0810    GLU 91 06/10/2022 0810        Component Value Date/Time    CALCIUM 8.8 06/10/2022 0810    AST 24 06/10/2022 0810    ALT 22 06/10/2022 0810    BILITOT 0.4 06/10/2022 0810    EGFRNONAA 87 02/04/2022 0721            Assessment:   Aortic regurgitation due to bicuspid aortic valve  Plans for valve sparing root replacement vs mechanical valve replacement with Dr Boudreaux. Proceed with diagnostic angiogram.     Thoracic aortic aneurysm (TAA)  Increasing TAA last measured around 5.4 cm x 5.3 cm. Proceed with diagnostic angiogram prior to surgery.     Essential hypertension  Chronic. Controlled. Follow up with Cardiology/PCP.    Plan:     -Will proceed with diagnostic coronary angiogram   - Anti-platelet Therapy: none  - Access: right femoral  - Creatinine/CrCl: in process  - Allergies: no contrast allergy  - All patient's questions were answered.  -The risks, benefits and alternatives of the procedure were explained to the patient.   -The risks of coronary angiography include but are not limited to: bleeding, infection, heart rhythm abnormalities, allergic reactions, kidney injury and potential need for dialysis, stroke and death.   - Should stenting be indicated, the patient has agreed to dual anti-platelet therapy for 1-consecutive " year with a drug-eluting stent and a minimum of 1-month with the use of a bare metal stent  - Additionally, pt is aware that non-compliance is likely to result in stent clotting with heart attack, heart failure, and/or death  -The risks of moderate sedation include hypotension, respiratory depression, arrhythmias, bronchospasm, and death.   - Informed consent was obtained and the  patient is agreeable to proceed with the procedure.             Montserrat Denis PA-C  Valve and Structural Heart Disease  [facility]      Staff:  I have personally taken the history and examined this patient and agree with the fellow's note as stated above and amended it accordingly :-)  Will do RCFA acccess, 6Fr, with 6Fr AL III and AL I diagnostic catheters for diagnostic coronary angiography prior to Bental Surgery. No DAP.  No ASA. Perclose closure.

## 2022-08-22 NOTE — ASSESSMENT & PLAN NOTE
Plans for valve sparing root replacement vs mechanical valve replacement with Dr Boudreaux. Proceed with diagnostic angiogram.

## 2022-08-24 ENCOUNTER — HOSPITAL ENCOUNTER (OUTPATIENT)
Facility: HOSPITAL | Age: 47
Discharge: HOME | End: 2022-08-24
Attending: INTERNAL MEDICINE | Admitting: INTERNAL MEDICINE
Payer: COMMERCIAL

## 2022-08-24 VITALS
HEIGHT: 76 IN | SYSTOLIC BLOOD PRESSURE: 126 MMHG | DIASTOLIC BLOOD PRESSURE: 77 MMHG | TEMPERATURE: 98 F | BODY MASS INDEX: 28.01 KG/M2 | RESPIRATION RATE: 17 BRPM | OXYGEN SATURATION: 100 % | WEIGHT: 230 LBS | HEART RATE: 56 BPM

## 2022-08-24 DIAGNOSIS — I71.20 THORACIC AORTIC ANEURYSM WITHOUT RUPTURE: ICD-10-CM

## 2022-08-24 DIAGNOSIS — Z01.818 PRE-OPERATIVE EXAM: ICD-10-CM

## 2022-08-24 PROCEDURE — C1894 INTRO/SHEATH, NON-LASER: HCPCS | Performed by: INTERNAL MEDICINE

## 2022-08-24 PROCEDURE — 93454 CORONARY ARTERY ANGIO S&I: CPT | Performed by: INTERNAL MEDICINE

## 2022-08-24 PROCEDURE — 25000003 PHARM REV CODE 250: Performed by: INTERNAL MEDICINE

## 2022-08-24 PROCEDURE — C1760 CLOSURE DEV, VASC: HCPCS | Performed by: INTERNAL MEDICINE

## 2022-08-24 PROCEDURE — 93010 ELECTROCARDIOGRAM REPORT: CPT | Mod: ,,, | Performed by: INTERNAL MEDICINE

## 2022-08-24 PROCEDURE — 93005 ELECTROCARDIOGRAM TRACING: CPT | Mod: 59

## 2022-08-24 PROCEDURE — 25500020 PHARM REV CODE 255: Performed by: INTERNAL MEDICINE

## 2022-08-24 PROCEDURE — 93454 PR CATH PLACE/CORONARY ANGIO, IMG SUPER/INTERP: ICD-10-PCS | Mod: 26,,, | Performed by: INTERNAL MEDICINE

## 2022-08-24 PROCEDURE — 93454 CORONARY ARTERY ANGIO S&I: CPT | Mod: 26,,, | Performed by: INTERNAL MEDICINE

## 2022-08-24 PROCEDURE — C1769 GUIDE WIRE: HCPCS | Performed by: INTERNAL MEDICINE

## 2022-08-24 PROCEDURE — 99152 MOD SED SAME PHYS/QHP 5/>YRS: CPT | Performed by: INTERNAL MEDICINE

## 2022-08-24 PROCEDURE — 93010 EKG 12-LEAD: ICD-10-PCS | Mod: ,,, | Performed by: INTERNAL MEDICINE

## 2022-08-24 PROCEDURE — 63600175 PHARM REV CODE 636 W HCPCS: Performed by: INTERNAL MEDICINE

## 2022-08-24 RX ORDER — ONDANSETRON 8 MG/1
8 TABLET, ORALLY DISINTEGRATING ORAL EVERY 8 HOURS PRN
Status: DISCONTINUED | OUTPATIENT
Start: 2022-08-24 | End: 2022-08-24 | Stop reason: HOSPADM

## 2022-08-24 RX ORDER — SODIUM CHLORIDE 9 MG/ML
INJECTION, SOLUTION INTRAVENOUS CONTINUOUS
Status: ACTIVE | OUTPATIENT
Start: 2022-08-24 | End: 2022-08-24

## 2022-08-24 RX ORDER — MIDAZOLAM HYDROCHLORIDE 1 MG/ML
INJECTION, SOLUTION INTRAMUSCULAR; INTRAVENOUS
Status: DISCONTINUED | OUTPATIENT
Start: 2022-08-24 | End: 2022-08-24 | Stop reason: HOSPADM

## 2022-08-24 RX ORDER — ACETAMINOPHEN 325 MG/1
650 TABLET ORAL EVERY 4 HOURS PRN
Status: DISCONTINUED | OUTPATIENT
Start: 2022-08-24 | End: 2022-08-24 | Stop reason: HOSPADM

## 2022-08-24 RX ORDER — SODIUM CHLORIDE 9 MG/ML
INJECTION, SOLUTION INTRAVENOUS CONTINUOUS
Status: DISCONTINUED | OUTPATIENT
Start: 2022-08-24 | End: 2022-08-24 | Stop reason: HOSPADM

## 2022-08-24 RX ORDER — CEFAZOLIN SODIUM 1 G/3ML
INJECTION, POWDER, FOR SOLUTION INTRAMUSCULAR; INTRAVENOUS
Status: DISCONTINUED | OUTPATIENT
Start: 2022-08-24 | End: 2022-08-24 | Stop reason: HOSPADM

## 2022-08-24 RX ORDER — DIPHENHYDRAMINE HCL 50 MG
50 CAPSULE ORAL ONCE
Status: COMPLETED | OUTPATIENT
Start: 2022-08-24 | End: 2022-08-24

## 2022-08-24 RX ORDER — HEPARIN SOD,PORCINE/0.9 % NACL 1000/500ML
INTRAVENOUS SOLUTION INTRAVENOUS
Status: DISCONTINUED | OUTPATIENT
Start: 2022-08-24 | End: 2022-08-24 | Stop reason: HOSPADM

## 2022-08-24 RX ORDER — LIDOCAINE HYDROCHLORIDE 20 MG/ML
INJECTION, SOLUTION INFILTRATION; PERINEURAL
Status: DISCONTINUED | OUTPATIENT
Start: 2022-08-24 | End: 2022-08-24 | Stop reason: HOSPADM

## 2022-08-24 RX ADMIN — SODIUM CHLORIDE: 0.9 INJECTION, SOLUTION INTRAVENOUS at 08:08

## 2022-08-24 RX ADMIN — DIPHENHYDRAMINE HYDROCHLORIDE 50 MG: 50 CAPSULE ORAL at 08:08

## 2022-08-24 NOTE — PLAN OF CARE
patient received to room 9 s/p angiogram. Report received from GORDON Lam. Right groin with dry gauze/tegaderm dressing intact. No bleeding,  No hematoma. + 2 pedal pulses palpable.  Vss. Telemetry monitor placed. Call light within reach. Spouse notified.

## 2022-08-24 NOTE — DISCHARGE SUMMARY
"Discharge Summary  Interventional Cardiology      Admit Date: 8/24/2022    Discharge Date:  8/24/2022    Attending Physician: No att. providers found    Discharge Physician: No att. providers found    Principal Diagnoses: <principal problem not specified>  Indication for Admission: Left heart cath (N/A)    Discharged Condition: Good    Hospital Course:   Patient presented for outpatient coronary angiogram which went without complication. Normal coronaries . See full cath report in Epic for details. Hemostasis of patient's R CFA access site was achieved with Perclosure. Patient was monitored per post-cath protocol, and his R groin access site was c/d/i with no hematoma. Patient was able to ambulate without difficulty. He was feeling well and anticipating discharge home today.   He was scheduled for Bentall procedure with Dr. Boudreaux in September.  He will be following with Dr. Jacques as outpatient  Outpatient Plan:  - There were no medication changes    Diet: Cardiac diet    Activity: Ad rosi    Disposition: Home or Self Care    Follow Up:      Discharge Medications:      Medication List      ASK your doctor about these medications    amlodipine-olmesartan 5-40 mg per tablet  Commonly known as: FLO  Take 1 tablet by mouth once daily.     cetirizine 10 mg Cap     EScitalopram oxalate 5 MG Tab  Commonly known as: LEXAPRO  Take 1 tablet (5 mg total) by mouth once daily.     esomeprazole 40 MG capsule  Commonly known as: NEXIUM     rosuvastatin 10 MG tablet  Commonly known as: CRESTOR  TAKE 1 TABLET(10 MG) BY MOUTH EVERY DAY     syringe with needle 1 mL 25 gauge x 1" Syrg  Inject 1 Syringe as directed every 7 days. Disp 1 box     testosterone cypionate 200 mg/mL injection  Commonly known as: DEPOTESTOTERONE CYPIONATE  Inject 0.5 mLs (100 mg total) into the muscle every 7 days.            Stalin Matt  Interventional Cardiology Fellow PGY-5  08/24/2022    "

## 2022-08-24 NOTE — PLAN OF CARE
Patient has ambulated and voided post bedrest without any problems. Tele monitor removed. IV hep lock removed. Right groin dressing remains clean and intact. No bleeding, no hematoma.  IV fluids complete. AVS printed. All home care instructions reviewed with patient. All questions answered. patient ambulatory for discharge with spouse.

## 2022-08-24 NOTE — OP NOTE
Brief Operative Note:    : Felix Monsalve MD     Referring Physician: Felix Monsalve     All Operators: Surgeon(s):  MD Stalin Banks MD     Preoperative Diagnosis: Thoracic aortic aneurysm without rupture [I71.2]  Pre-operative exam [Z01.818]     Postop Diagnosis: Thoracic aortic aneurysm without rupture [I71.2]  Pre-operative exam [Z01.818]    Treatments/Procedures: Procedure(s) (LRB):  Left heart cath (N/A)    Access: Right CFA    Findings - Normal coronaries      See catheterization report for full details.    Intervention: None    See catheterization report for full details.    Closure device: Perclose       Plan:  - Post cath protocol   - IVF @ 125 cc/hr x 4 hours  - Bed rest x 4 hours   - Discharge when           Estimated Blood loss: 20 cc    Specimens removed: No    Stalin Matt MD  [facility]  Cardiovascular Disease, PGY-IV  [unfilled]

## 2022-09-08 ENCOUNTER — HOSPITAL ENCOUNTER (OUTPATIENT)
Dept: PULMONOLOGY | Facility: CLINIC | Age: 47
Discharge: HOME | End: 2022-09-08
Payer: COMMERCIAL

## 2022-09-08 ENCOUNTER — PATIENT MESSAGE (OUTPATIENT)
Dept: FAMILY MEDICINE | Facility: CLINIC | Age: 47
End: 2022-09-08
Payer: COMMERCIAL

## 2022-09-08 ENCOUNTER — HOSPITAL ENCOUNTER (OUTPATIENT)
Dept: RADIOLOGY | Facility: HOSPITAL | Age: 47
Discharge: HOME | End: 2022-09-08
Attending: THORACIC SURGERY (CARDIOTHORACIC VASCULAR SURGERY)
Payer: COMMERCIAL

## 2022-09-08 ENCOUNTER — HOSPITAL ENCOUNTER (OUTPATIENT)
Dept: CARDIOLOGY | Facility: HOSPITAL | Age: 47
Discharge: HOME | End: 2022-09-08
Attending: THORACIC SURGERY (CARDIOTHORACIC VASCULAR SURGERY)
Payer: COMMERCIAL

## 2022-09-08 ENCOUNTER — HOSPITAL ENCOUNTER (OUTPATIENT)
Dept: CARDIOLOGY | Facility: CLINIC | Age: 47
Discharge: HOME | End: 2022-09-08
Payer: COMMERCIAL

## 2022-09-08 ENCOUNTER — HOSPITAL ENCOUNTER (OUTPATIENT)
Dept: VASCULAR SURGERY | Facility: CLINIC | Age: 47
Discharge: HOME | End: 2022-09-08
Attending: THORACIC SURGERY (CARDIOTHORACIC VASCULAR SURGERY)
Payer: COMMERCIAL

## 2022-09-08 ENCOUNTER — OFFICE VISIT (OUTPATIENT)
Dept: CARDIOTHORACIC SURGERY | Facility: CLINIC | Age: 47
End: 2022-09-08
Payer: COMMERCIAL

## 2022-09-08 VITALS
BODY MASS INDEX: 28.01 KG/M2 | WEIGHT: 230 LBS | SYSTOLIC BLOOD PRESSURE: 130 MMHG | HEART RATE: 70 BPM | DIASTOLIC BLOOD PRESSURE: 70 MMHG | HEIGHT: 76 IN

## 2022-09-08 VITALS
WEIGHT: 242.19 LBS | SYSTOLIC BLOOD PRESSURE: 121 MMHG | DIASTOLIC BLOOD PRESSURE: 80 MMHG | BODY MASS INDEX: 29.49 KG/M2 | RESPIRATION RATE: 18 BRPM | HEART RATE: 64 BPM | OXYGEN SATURATION: 99 % | HEIGHT: 76 IN

## 2022-09-08 DIAGNOSIS — Z01.818 PRE-OP TESTING: ICD-10-CM

## 2022-09-08 DIAGNOSIS — I71.20 THORACIC AORTIC ANEURYSM WITHOUT RUPTURE: ICD-10-CM

## 2022-09-08 DIAGNOSIS — Q23.1 BICUSPID AORTIC VALVE: ICD-10-CM

## 2022-09-08 DIAGNOSIS — Q23.1 AORTIC REGURGITATION DUE TO BICUSPID AORTIC VALVE: Primary | ICD-10-CM

## 2022-09-08 DIAGNOSIS — R00.1: ICD-10-CM

## 2022-09-08 LAB
ASCENDING AORTA: 5.47 CM
AV MEAN GRADIENT: 14 MMHG
AV PEAK GRADIENT: 27 MMHG
BSA FOR ECHO PROCEDURE: 2.37 M2
CV ECHO LV RWT: 0.38 CM
DOP CALC AO PEAK VEL: 2.61 M/S
DOP CALC AO VTI: 58.43 CM
DOP CALC LVOT AREA: 6.5 CM2
DOP CALC LVOT DIAMETER: 2.88 CM
E WAVE DECELERATION TIME: 202.09 MSEC
E/A RATIO: 1.16
E/E' RATIO: 6 M/S
ECHO LV POSTERIOR WALL: 1 CM (ref 0.6–1.1)
EJECTION FRACTION: 63 %
FRACTIONAL SHORTENING: 36 % (ref 28–44)
INTERVENTRICULAR SEPTUM: 0.85 CM (ref 0.6–1.1)
IVRT: 68.51 MSEC
LA MAJOR: 5.33 CM
LA MINOR: 5.27 CM
LA WIDTH: 4.6 CM
LEFT ATRIUM SIZE: 3.02 CM
LEFT ATRIUM VOLUME INDEX MOD: 37 ML/M2
LEFT ATRIUM VOLUME INDEX: 26.6 ML/M2
LEFT ATRIUM VOLUME MOD: 86.86 CM3
LEFT ATRIUM VOLUME: 62.58 CM3
LEFT INTERNAL DIMENSION IN SYSTOLE: 3.32 CM (ref 2.1–4)
LEFT VENTRICLE DIASTOLIC VOLUME INDEX: 55.11 ML/M2
LEFT VENTRICLE DIASTOLIC VOLUME: 129.51 ML
LEFT VENTRICLE MASS INDEX: 75 G/M2
LEFT VENTRICLE SYSTOLIC VOLUME INDEX: 19.1 ML/M2
LEFT VENTRICLE SYSTOLIC VOLUME: 44.84 ML
LEFT VENTRICULAR INTERNAL DIMENSION IN DIASTOLE: 5.2 CM (ref 3.5–6)
LEFT VENTRICULAR MASS: 175.15 G
LV LATERAL E/E' RATIO: 5.14 M/S
LV SEPTAL E/E' RATIO: 7.2 M/S
MV A" WAVE DURATION": 18.84 MSEC
MV PEAK A VEL: 0.62 M/S
MV PEAK E VEL: 0.72 M/S
MV STENOSIS PRESSURE HALF TIME: 58.61 MS
MV VALVE AREA P 1/2 METHOD: 3.75 CM2
PISA TR MAX VEL: 2.65 M/S
PULM VEIN S/D RATIO: 1.29
PV PEAK D VEL: 0.49 M/S
PV PEAK S VEL: 0.63 M/S
RA MAJOR: 5.5 CM
RA PRESSURE: 3 MMHG
RA WIDTH: 4.43 CM
RIGHT VENTRICULAR END-DIASTOLIC DIMENSION: 4.25 CM
RV TISSUE DOPPLER FREE WALL SYSTOLIC VELOCITY 1 (APICAL 4 CHAMBER VIEW): 16.75 CM/S
SINUS: 4.3 CM
STJ: 3.87 CM
TDI LATERAL: 0.14 M/S
TDI SEPTAL: 0.1 M/S
TDI: 0.12 M/S
TR MAX PG: 28 MMHG
TRICUSPID ANNULAR PLANE SYSTOLIC EXCURSION: 2.64 CM
TV REST PULMONARY ARTERY PRESSURE: 31 MMHG

## 2022-09-08 PROCEDURE — 3079F PR MOST RECENT DIASTOLIC BLOOD PRESSURE 80-89 MM HG: ICD-10-PCS | Mod: CPTII,S$GLB,, | Performed by: THORACIC SURGERY (CARDIOTHORACIC VASCULAR SURGERY)

## 2022-09-08 PROCEDURE — 71046 X-RAY EXAM CHEST 2 VIEWS: CPT | Mod: TC,FY

## 2022-09-08 PROCEDURE — 93880 PR DUPLEX SCAN EXTRACRANIAL,BILAT: ICD-10-PCS | Mod: S$GLB,,, | Performed by: SURGERY

## 2022-09-08 PROCEDURE — 3074F PR MOST RECENT SYSTOLIC BLOOD PRESSURE < 130 MM HG: ICD-10-PCS | Mod: CPTII,S$GLB,, | Performed by: THORACIC SURGERY (CARDIOTHORACIC VASCULAR SURGERY)

## 2022-09-08 PROCEDURE — 4010F PR ACE/ARB THEARPY RXD/TAKEN: ICD-10-PCS | Mod: CPTII,S$GLB,, | Performed by: THORACIC SURGERY (CARDIOTHORACIC VASCULAR SURGERY)

## 2022-09-08 PROCEDURE — 93306 TTE W/DOPPLER COMPLETE: CPT

## 2022-09-08 PROCEDURE — 3008F BODY MASS INDEX DOCD: CPT | Mod: CPTII,S$GLB,, | Performed by: THORACIC SURGERY (CARDIOTHORACIC VASCULAR SURGERY)

## 2022-09-08 PROCEDURE — 93880 EXTRACRANIAL BILAT STUDY: CPT | Mod: S$GLB,,, | Performed by: SURGERY

## 2022-09-08 PROCEDURE — 94010 BREATHING CAPACITY TEST: CPT | Mod: S$GLB,,, | Performed by: INTERNAL MEDICINE

## 2022-09-08 PROCEDURE — 93005 ELECTROCARDIOGRAM TRACING: CPT | Mod: S$GLB,,, | Performed by: THORACIC SURGERY (CARDIOTHORACIC VASCULAR SURGERY)

## 2022-09-08 PROCEDURE — 93306 ECHO (CUPID ONLY): ICD-10-PCS | Mod: 26,,, | Performed by: INTERNAL MEDICINE

## 2022-09-08 PROCEDURE — 93010 EKG 12-LEAD: ICD-10-PCS | Mod: S$GLB,,, | Performed by: INTERNAL MEDICINE

## 2022-09-08 PROCEDURE — 3079F DIAST BP 80-89 MM HG: CPT | Mod: CPTII,S$GLB,, | Performed by: THORACIC SURGERY (CARDIOTHORACIC VASCULAR SURGERY)

## 2022-09-08 PROCEDURE — 4010F ACE/ARB THERAPY RXD/TAKEN: CPT | Mod: CPTII,S$GLB,, | Performed by: THORACIC SURGERY (CARDIOTHORACIC VASCULAR SURGERY)

## 2022-09-08 PROCEDURE — 99499 NO LOS: ICD-10-PCS | Mod: S$GLB,,, | Performed by: THORACIC SURGERY (CARDIOTHORACIC VASCULAR SURGERY)

## 2022-09-08 PROCEDURE — 3044F PR MOST RECENT HEMOGLOBIN A1C LEVEL <7.0%: ICD-10-PCS | Mod: CPTII,S$GLB,, | Performed by: THORACIC SURGERY (CARDIOTHORACIC VASCULAR SURGERY)

## 2022-09-08 PROCEDURE — 1159F PR MEDICATION LIST DOCUMENTED IN MEDICAL RECORD: ICD-10-PCS | Mod: CPTII,S$GLB,, | Performed by: THORACIC SURGERY (CARDIOTHORACIC VASCULAR SURGERY)

## 2022-09-08 PROCEDURE — 1159F MED LIST DOCD IN RCRD: CPT | Mod: CPTII,S$GLB,, | Performed by: THORACIC SURGERY (CARDIOTHORACIC VASCULAR SURGERY)

## 2022-09-08 PROCEDURE — 99999 PR PBB SHADOW E&M-EST. PATIENT-LVL IV: CPT | Mod: PBBFAC,,, | Performed by: THORACIC SURGERY (CARDIOTHORACIC VASCULAR SURGERY)

## 2022-09-08 PROCEDURE — 3074F SYST BP LT 130 MM HG: CPT | Mod: CPTII,S$GLB,, | Performed by: THORACIC SURGERY (CARDIOTHORACIC VASCULAR SURGERY)

## 2022-09-08 PROCEDURE — 93005 EKG 12-LEAD: ICD-10-PCS | Mod: S$GLB,,, | Performed by: THORACIC SURGERY (CARDIOTHORACIC VASCULAR SURGERY)

## 2022-09-08 PROCEDURE — 93010 ELECTROCARDIOGRAM REPORT: CPT | Mod: S$GLB,,, | Performed by: INTERNAL MEDICINE

## 2022-09-08 PROCEDURE — 99499 UNLISTED E&M SERVICE: CPT | Mod: S$GLB,,, | Performed by: THORACIC SURGERY (CARDIOTHORACIC VASCULAR SURGERY)

## 2022-09-08 PROCEDURE — 94010 BREATHING CAPACITY TEST: ICD-10-PCS | Mod: S$GLB,,, | Performed by: INTERNAL MEDICINE

## 2022-09-08 PROCEDURE — 94729 PR C02/MEMBANE DIFFUSE CAPACITY: ICD-10-PCS | Mod: S$GLB,,, | Performed by: INTERNAL MEDICINE

## 2022-09-08 PROCEDURE — 71046 X-RAY EXAM CHEST 2 VIEWS: CPT | Mod: 26,,, | Performed by: RADIOLOGY

## 2022-09-08 PROCEDURE — 71046 XR CHEST PA AND LATERAL: ICD-10-PCS | Mod: 26,,, | Performed by: RADIOLOGY

## 2022-09-08 PROCEDURE — 3044F HG A1C LEVEL LT 7.0%: CPT | Mod: CPTII,S$GLB,, | Performed by: THORACIC SURGERY (CARDIOTHORACIC VASCULAR SURGERY)

## 2022-09-08 PROCEDURE — 94729 DIFFUSING CAPACITY: CPT | Mod: S$GLB,,, | Performed by: INTERNAL MEDICINE

## 2022-09-08 PROCEDURE — 93306 TTE W/DOPPLER COMPLETE: CPT | Mod: 26,,, | Performed by: INTERNAL MEDICINE

## 2022-09-08 PROCEDURE — 99999 PR PBB SHADOW E&M-EST. PATIENT-LVL IV: ICD-10-PCS | Mod: PBBFAC,,, | Performed by: THORACIC SURGERY (CARDIOTHORACIC VASCULAR SURGERY)

## 2022-09-08 PROCEDURE — 3008F PR BODY MASS INDEX (BMI) DOCUMENTED: ICD-10-PCS | Mod: CPTII,S$GLB,, | Performed by: THORACIC SURGERY (CARDIOTHORACIC VASCULAR SURGERY)

## 2022-09-08 RX ORDER — ACETAMINOPHEN 325 MG/1
650 TABLET ORAL EVERY 4 HOURS PRN
Status: CANCELLED | OUTPATIENT
Start: 2022-09-08

## 2022-09-08 RX ORDER — ALBUMIN HUMAN 50 G/1000ML
25 SOLUTION INTRAVENOUS ONCE AS NEEDED
Status: CANCELLED | OUTPATIENT
Start: 2022-09-08 | End: 2034-02-04

## 2022-09-08 RX ORDER — POLYETHYLENE GLYCOL 3350 17 G/17G
17 POWDER, FOR SOLUTION ORAL DAILY
Status: CANCELLED | OUTPATIENT
Start: 2022-09-09

## 2022-09-08 RX ORDER — PROMETHAZINE HYDROCHLORIDE 25 MG/1
TABLET ORAL
Status: ON HOLD | COMMUNITY
End: 2022-09-12

## 2022-09-08 RX ORDER — ASPIRIN 325 MG
325 TABLET, DELAYED RELEASE (ENTERIC COATED) ORAL DAILY
Status: CANCELLED | OUTPATIENT
Start: 2022-09-09

## 2022-09-08 RX ORDER — ONDANSETRON 2 MG/ML
4 INJECTION INTRAMUSCULAR; INTRAVENOUS EVERY 12 HOURS PRN
Status: CANCELLED | OUTPATIENT
Start: 2022-09-08

## 2022-09-08 RX ORDER — ASPIRIN 300 MG/1
300 SUPPOSITORY RECTAL ONCE AS NEEDED
Status: CANCELLED | OUTPATIENT
Start: 2022-09-08 | End: 2034-02-04

## 2022-09-08 RX ORDER — DEXTROSE MONOHYDRATE, SODIUM CHLORIDE, AND POTASSIUM CHLORIDE 50; 1.49; 4.5 G/1000ML; G/1000ML; G/1000ML
INJECTION, SOLUTION INTRAVENOUS CONTINUOUS
Status: CANCELLED | OUTPATIENT
Start: 2022-09-08

## 2022-09-08 RX ORDER — SODIUM CHLORIDE 9 MG/ML
INJECTION, SOLUTION INTRAVENOUS CONTINUOUS
Status: CANCELLED | OUTPATIENT
Start: 2022-09-08

## 2022-09-08 RX ORDER — POTASSIUM CHLORIDE 29.8 MG/ML
40 INJECTION INTRAVENOUS
Status: CANCELLED | OUTPATIENT
Start: 2022-09-08

## 2022-09-08 RX ORDER — FENTANYL CITRATE 50 UG/ML
25 INJECTION, SOLUTION INTRAMUSCULAR; INTRAVENOUS
Status: CANCELLED | OUTPATIENT
Start: 2022-09-08 | End: 2022-09-10

## 2022-09-08 RX ORDER — MUPIROCIN 20 MG/G
1 OINTMENT TOPICAL 2 TIMES DAILY
Status: CANCELLED | OUTPATIENT
Start: 2022-09-08 | End: 2022-09-13

## 2022-09-08 RX ORDER — FENTANYL CITRATE 50 UG/ML
50 INJECTION, SOLUTION INTRAMUSCULAR; INTRAVENOUS
Status: CANCELLED | OUTPATIENT
Start: 2022-09-11

## 2022-09-08 RX ORDER — ASPIRIN 325 MG
325 TABLET ORAL DAILY
Status: CANCELLED | OUTPATIENT
Start: 2022-09-08

## 2022-09-08 RX ORDER — POTASSIUM CHLORIDE 20 MEQ/1
20 TABLET, EXTENDED RELEASE ORAL EVERY 12 HOURS
Status: CANCELLED | OUTPATIENT
Start: 2022-09-08

## 2022-09-08 RX ORDER — POTASSIUM CHLORIDE 14.9 MG/ML
60 INJECTION INTRAVENOUS
Status: CANCELLED | OUTPATIENT
Start: 2022-09-08

## 2022-09-08 RX ORDER — BISACODYL 10 MG
10 SUPPOSITORY, RECTAL RECTAL DAILY PRN
Status: CANCELLED | OUTPATIENT
Start: 2022-09-08

## 2022-09-08 RX ORDER — ASPIRIN 325 MG
325 TABLET ORAL ONCE
Status: CANCELLED | OUTPATIENT
Start: 2022-09-08 | End: 2022-09-08

## 2022-09-08 RX ORDER — METOPROLOL TARTRATE 25 MG/1
25 TABLET, FILM COATED ORAL
Status: CANCELLED | OUTPATIENT
Start: 2022-09-08

## 2022-09-08 RX ORDER — CEFAZOLIN SODIUM/D5W 2 G/100 ML
2 PLASTIC BAG, INJECTION (ML) INTRAVENOUS
Status: CANCELLED | OUTPATIENT
Start: 2022-09-08 | End: 2022-09-10

## 2022-09-08 RX ORDER — HYDROGEN PEROXIDE 3 %
SOLUTION, NON-ORAL MISCELLANEOUS
Status: ON HOLD | COMMUNITY
End: 2022-09-16 | Stop reason: HOSPADM

## 2022-09-08 RX ORDER — OXYCODONE HYDROCHLORIDE 5 MG/1
5 TABLET ORAL EVERY 4 HOURS PRN
Status: CANCELLED | OUTPATIENT
Start: 2022-09-08

## 2022-09-08 RX ORDER — IPRATROPIUM BROMIDE AND ALBUTEROL SULFATE 2.5; .5 MG/3ML; MG/3ML
3 SOLUTION RESPIRATORY (INHALATION) EVERY 4 HOURS
Status: CANCELLED | OUTPATIENT
Start: 2022-09-08 | End: 2022-09-09

## 2022-09-08 RX ORDER — AZELASTINE 1 MG/ML
SPRAY, METERED NASAL
COMMUNITY
End: 2022-09-28

## 2022-09-08 RX ORDER — MAGNESIUM SULFATE HEPTAHYDRATE 40 MG/ML
4 INJECTION, SOLUTION INTRAVENOUS
Status: CANCELLED | OUTPATIENT
Start: 2022-09-08

## 2022-09-08 RX ORDER — METOCLOPRAMIDE HYDROCHLORIDE 5 MG/ML
5 INJECTION INTRAMUSCULAR; INTRAVENOUS EVERY 6 HOURS PRN
Status: CANCELLED | OUTPATIENT
Start: 2022-09-08

## 2022-09-08 RX ORDER — OXYCODONE HYDROCHLORIDE 10 MG/1
10 TABLET ORAL EVERY 4 HOURS PRN
Status: CANCELLED | OUTPATIENT
Start: 2022-09-08

## 2022-09-08 RX ORDER — IPRATROPIUM BROMIDE AND ALBUTEROL SULFATE 2.5; .5 MG/3ML; MG/3ML
3 SOLUTION RESPIRATORY (INHALATION) EVERY 4 HOURS PRN
Status: CANCELLED | OUTPATIENT
Start: 2022-09-08 | End: 2022-09-09

## 2022-09-08 RX ORDER — ATORVASTATIN CALCIUM 40 MG/1
40 TABLET, FILM COATED ORAL NIGHTLY
Status: CANCELLED | OUTPATIENT
Start: 2022-09-08

## 2022-09-08 RX ORDER — POLYETHYLENE GLYCOL 3350 17 G/17G
POWDER, FOR SOLUTION ORAL
Status: ON HOLD | COMMUNITY
Start: 2022-07-26 | End: 2022-09-12

## 2022-09-08 RX ORDER — BISACODYL 5 MG/1
TABLET, COATED ORAL
Status: ON HOLD | COMMUNITY
Start: 2022-07-26 | End: 2022-09-12

## 2022-09-08 RX ORDER — POTASSIUM CHLORIDE 14.9 MG/ML
20 INJECTION INTRAVENOUS
Status: CANCELLED | OUTPATIENT
Start: 2022-09-08

## 2022-09-08 RX ORDER — SODIUM CHLORIDE 0.9 % (FLUSH) 0.9 %
10 SYRINGE (ML) INJECTION
Status: CANCELLED | OUTPATIENT
Start: 2022-09-08

## 2022-09-08 RX ORDER — PROPOFOL 10 MG/ML
0-50 INJECTION, EMULSION INTRAVENOUS CONTINUOUS
Status: CANCELLED | OUTPATIENT
Start: 2022-09-08

## 2022-09-08 RX ORDER — LIDOCAINE HYDROCHLORIDE 10 MG/ML
1 INJECTION, SOLUTION EPIDURAL; INFILTRATION; INTRACAUDAL; PERINEURAL
Status: CANCELLED | OUTPATIENT
Start: 2022-09-08

## 2022-09-08 RX ORDER — MUPIROCIN 20 MG/G
1 OINTMENT TOPICAL
Status: CANCELLED | OUTPATIENT
Start: 2022-09-08

## 2022-09-08 RX ORDER — FAMOTIDINE 10 MG/ML
20 INJECTION INTRAVENOUS 2 TIMES DAILY
Status: CANCELLED | OUTPATIENT
Start: 2022-09-08

## 2022-09-08 RX ORDER — FAMOTIDINE 20 MG/1
20 TABLET, FILM COATED ORAL 2 TIMES DAILY
Status: CANCELLED | OUTPATIENT
Start: 2022-09-08

## 2022-09-08 RX ORDER — MAGNESIUM SULFATE HEPTAHYDRATE 40 MG/ML
2 INJECTION, SOLUTION INTRAVENOUS
Status: CANCELLED | OUTPATIENT
Start: 2022-09-08

## 2022-09-08 RX ORDER — DOCUSATE SODIUM 100 MG/1
100 CAPSULE, LIQUID FILLED ORAL 2 TIMES DAILY
Status: CANCELLED | OUTPATIENT
Start: 2022-09-08

## 2022-09-08 RX ORDER — CEFAZOLIN SODIUM/D5W 2 G/100 ML
2 PLASTIC BAG, INJECTION (ML) INTRAVENOUS
Status: CANCELLED | OUTPATIENT
Start: 2022-09-08

## 2022-09-08 RX ORDER — FENTANYL CITRATE 50 UG/ML
25 INJECTION, SOLUTION INTRAMUSCULAR; INTRAVENOUS
Status: CANCELLED | OUTPATIENT
Start: 2022-09-08

## 2022-09-08 NOTE — H&P (VIEW-ONLY)
Subjective:      Patient ID: Alden Ledbetter is a 47 y.o. male.    Chief Complaint: No chief complaint on file.      HPI:  Alden Ledbetter is a 47 y.o. male who presents for pre-op. Medical conditions include GERD, HLD, HTN, bicuspid AV with moderate AR. Patient states that he has regularly followed with a cardiologist due to his HTN and family history of heart disease. On an echo from 10/26/2020 a bicuspid AV was noted along with a TAA measuring ~4.3cm. A repeat echo from 7/6/22 noted the TAA at 5.6cm. A subsequent CT scan was ordered which revealed ~5.4 x 5.3cm TAA after echo showed a dilated aortic root. Patient denies any family history of TAA or connective tissue disorders. Reports that he is very healthy. Active and does CrossFit. Denies any SOB, fatigue, lower extremity swelling, dizziness, orthopnea, chest pain, or palpitations. No prior strokes, seizures, blood clots, stents, or sternotomies. No smoking history. Occasional drinker. Reports some anxiety since diagnosis. Blood pressure is well controlled, systolic usually low teens. Ready for surgery. Anemic on last labs but reports he frequently donates blood. HR 54 on pre-operative EKG.      Family and social history reviewed    Review of patient's allergies indicates:   Allergen Reactions    Dexlansoprazole     Prevacid [lansoprazole]      Past Medical History:   Diagnosis Date    Aortic valve, bicuspid 2020    echo    GERD (gastroesophageal reflux disease)     Hyperlipidemia     Hypertension     Male hypogonadism      Past Surgical History:   Procedure Laterality Date    ELBOW SURGERY Right     LEFT HEART CATHETERIZATION N/A 8/24/2022    Procedure: Left heart cath;  Surgeon: Felix Monsalve MD;  Location: [facility] CATH LAB;  Service: Cardiology;  Laterality: N/A;    NASAL SEPTUM SURGERY       Family History    None       Social History     Socioeconomic History    Marital status:     Number of children: 2   Occupational History     Occupation: fulltime    Tobacco Use    Smoking status: Never    Smokeless tobacco: Never   Substance and Sexual Activity    Alcohol use: Yes     Comment: socially    Drug use: Never    Sexual activity: Yes     Partners: Female       Current medications Reviewed    Review of Systems   Constitutional: Negative for activity change and fatigue.   HENT: Negative for nosebleeds and tinnitus.    Eyes: Negative for visual disturbance.   Respiratory: Negative for shortness of breath.    Cardiovascular: Negative for chest pain, palpitations and leg swelling.   Gastrointestinal: Negative for nausea and vomiting.   Musculoskeletal: Negative for gait problem.   Skin: Negative for color change and pallor.   Neurological: Negative for dizziness, seizures, syncope and weakness.   Hematological: Does not bruise/bleed easily.   Psychiatric/Behavioral: Negative for sleep disturbance.      Objective:   Physical Exam  Vitals reviewed.   Constitutional:       General: He is not in acute distress.     Appearance: He is well-developed. He is not diaphoretic.   HENT:      Head: Normocephalic and atraumatic.   Eyes:      Pupils: Pupils are equal, round, and reactive to light.   Neck:      Vascular: No JVD.   Cardiovascular:      Rate and Rhythm: Normal rate.   Pulmonary:      Effort: Pulmonary effort is normal. No respiratory distress.   Abdominal:      General: There is no distension.   Musculoskeletal:         General: No swelling. Normal range of motion.      Cervical back: Normal range of motion.   Skin:     Coloration: Skin is not pale.   Neurological:      Mental Status: He is alert and oriented to person, place, and time.   Psychiatric:         Speech: Speech normal.         Behavior: Behavior normal.         Thought Content: Thought content normal.         Judgment: Judgment normal.     Diagnostic Results: reviewed   TTE 9/8/22  The left ventricle is normal in size with normal systolic function.  The estimated ejection fraction is  63%.  Normal left ventricular diastolic function.  Normal right ventricular size with normal right ventricular systolic function.  Mild right atrial enlargement.  The aortic valve is bileaflet.  Mild aortic regurgitation.  Normal central venous pressure (3 mmHg).  The estimated PA systolic pressure is 31 mmHg.  The ascending aorta is moderate-severely dilated.  Trivial pericardial effusion. Under the RA.    [facility] 8/24/22  1. Severe AS AI with ascending aortic aneurysm.  2. Normal coronary    OSH CTA 7/29/22  - prominence of the aortic root ~4.1cm   - fusiform aneurysmal dilation of the ascending TAA measuring 5.4 x 5.3 cm   - at the proximal aspect of the aortic arch the aorta measures 4cm   - at the distal aspect of the arch the aorta returns to normal caliber and remains normal in caliber through the descending aorta      OSH TTE 7/6/22  - EF 60-65%  - normal RV size   - LA chamber size is normal   - bicuspid AV with moderate AR   - ANTOLIN 2.16, MG 11  - mild thickening of the MV anterior and posterior leaflets with mild MR   - mild TR   - RAP 3mmHg  - ascending aorta is moderately dilated at 5.6cm (previously 4.3cm on TTE 10/26/20)    Assessment:   TAA  Bicuspid AV  Plan:     CTS Attending Note:     I have personally taken the history and examined this patient and agree with the YOKASTA's note as stated above.  Very pleasant 47-year-old gentleman with bicuspid aortic valve and ascending aortic aneurysm.  We planned hemiarch replacement as well as aortic root replacement.  I discussed options regarding his valve with him.  He indicated he desires a mechanical prosthesis.  His questions have been answered, and he wishes to proceed.

## 2022-09-08 NOTE — PROGRESS NOTES
Subjective:      Patient ID: Alden Ledbetter is a 47 y.o. male.    Chief Complaint: No chief complaint on file.      HPI:  Alden Ledbetter is a 47 y.o. male who presents for pre-op. Medical conditions include GERD, HLD, HTN, bicuspid AV with moderate AR. Patient states that he has regularly followed with a cardiologist due to his HTN and family history of heart disease. On an echo from 10/26/2020 a bicuspid AV was noted along with a TAA measuring ~4.3cm. A repeat echo from 7/6/22 noted the TAA at 5.6cm. A subsequent CT scan was ordered which revealed ~5.4 x 5.3cm TAA after echo showed a dilated aortic root. Patient denies any family history of TAA or connective tissue disorders. Reports that he is very healthy. Active and does CrossFit. Denies any SOB, fatigue, lower extremity swelling, dizziness, orthopnea, chest pain, or palpitations. No prior strokes, seizures, blood clots, stents, or sternotomies. No smoking history. Occasional drinker. Reports some anxiety since diagnosis. Blood pressure is well controlled, systolic usually low teens. Ready for surgery. Anemic on last labs but reports he frequently donates blood. HR 54 on pre-operative EKG.      Family and social history reviewed    Review of patient's allergies indicates:   Allergen Reactions    Dexlansoprazole     Prevacid [lansoprazole]      Past Medical History:   Diagnosis Date    Aortic valve, bicuspid 2020    echo    GERD (gastroesophageal reflux disease)     Hyperlipidemia     Hypertension     Male hypogonadism      Past Surgical History:   Procedure Laterality Date    ELBOW SURGERY Right     LEFT HEART CATHETERIZATION N/A 8/24/2022    Procedure: Left heart cath;  Surgeon: Felix Monsalve MD;  Location: [facility] CATH LAB;  Service: Cardiology;  Laterality: N/A;    NASAL SEPTUM SURGERY       Family History    None       Social History     Socioeconomic History    Marital status:     Number of children: 2   Occupational History     Occupation: fulltime    Tobacco Use    Smoking status: Never    Smokeless tobacco: Never   Substance and Sexual Activity    Alcohol use: Yes     Comment: socially    Drug use: Never    Sexual activity: Yes     Partners: Female       Current medications Reviewed    Review of Systems   Constitutional: Negative for activity change and fatigue.   HENT: Negative for nosebleeds and tinnitus.    Eyes: Negative for visual disturbance.   Respiratory: Negative for shortness of breath.    Cardiovascular: Negative for chest pain, palpitations and leg swelling.   Gastrointestinal: Negative for nausea and vomiting.   Musculoskeletal: Negative for gait problem.   Skin: Negative for color change and pallor.   Neurological: Negative for dizziness, seizures, syncope and weakness.   Hematological: Does not bruise/bleed easily.   Psychiatric/Behavioral: Negative for sleep disturbance.      Objective:   Physical Exam  Vitals reviewed.   Constitutional:       General: He is not in acute distress.     Appearance: He is well-developed. He is not diaphoretic.   HENT:      Head: Normocephalic and atraumatic.   Eyes:      Pupils: Pupils are equal, round, and reactive to light.   Neck:      Vascular: No JVD.   Cardiovascular:      Rate and Rhythm: Normal rate.   Pulmonary:      Effort: Pulmonary effort is normal. No respiratory distress.   Abdominal:      General: There is no distension.   Musculoskeletal:         General: No swelling. Normal range of motion.      Cervical back: Normal range of motion.   Skin:     Coloration: Skin is not pale.   Neurological:      Mental Status: He is alert and oriented to person, place, and time.   Psychiatric:         Speech: Speech normal.         Behavior: Behavior normal.         Thought Content: Thought content normal.         Judgment: Judgment normal.     Diagnostic Results: reviewed   TTE 9/8/22  The left ventricle is normal in size with normal systolic function.  The estimated ejection fraction is  63%.  Normal left ventricular diastolic function.  Normal right ventricular size with normal right ventricular systolic function.  Mild right atrial enlargement.  The aortic valve is bileaflet.  Mild aortic regurgitation.  Normal central venous pressure (3 mmHg).  The estimated PA systolic pressure is 31 mmHg.  The ascending aorta is moderate-severely dilated.  Trivial pericardial effusion. Under the RA.    [facility] 8/24/22  1. Severe AS AI with ascending aortic aneurysm.  2. Normal coronary    OSH CTA 7/29/22  - prominence of the aortic root ~4.1cm   - fusiform aneurysmal dilation of the ascending TAA measuring 5.4 x 5.3 cm   - at the proximal aspect of the aortic arch the aorta measures 4cm   - at the distal aspect of the arch the aorta returns to normal caliber and remains normal in caliber through the descending aorta      OSH TTE 7/6/22  - EF 60-65%  - normal RV size   - LA chamber size is normal   - bicuspid AV with moderate AR   - ANTOLIN 2.16, MG 11  - mild thickening of the MV anterior and posterior leaflets with mild MR   - mild TR   - RAP 3mmHg  - ascending aorta is moderately dilated at 5.6cm (previously 4.3cm on TTE 10/26/20)    Assessment:   TAA  Bicuspid AV  Plan:     CTS Attending Note:     I have personally taken the history and examined this patient and agree with the YOKASTA's note as stated above.  Very pleasant 47-year-old gentleman with bicuspid aortic valve and ascending aortic aneurysm.  We planned hemiarch replacement as well as aortic root replacement.  I discussed options regarding his valve with him.  He indicated he desires a mechanical prosthesis.  His questions have been answered, and he wishes to proceed.

## 2022-09-09 ENCOUNTER — TELEPHONE (OUTPATIENT)
Dept: CARDIOTHORACIC SURGERY | Facility: CLINIC | Age: 47
End: 2022-09-09
Payer: COMMERCIAL

## 2022-09-09 ENCOUNTER — ANESTHESIA EVENT (OUTPATIENT)
Dept: SURGERY | Facility: HOSPITAL | Age: 47
DRG: 220 | End: 2022-09-09
Payer: COMMERCIAL

## 2022-09-09 NOTE — TELEPHONE ENCOUNTER
Called pt and informed him of arrival time for surgery.  Pt instructed to report to [facility] at 5:00 Monday morning.  Pt reminded to perform Hibiclens shower Sunday night and Monday morning, and to become NPO at midnight Sunday into Monday.  Pt verbalized understanding.

## 2022-09-11 LAB
DLCO ADJ PRE: 33.77 ML/(MIN*MMHG) (ref 30.24–44.09)
DLCO SINGLE BREATH LLN: 30.24
DLCO SINGLE BREATH PRE REF: 86.2 %
DLCO SINGLE BREATH REF: 37.17
DLCOC SBVA LLN: 3.42
DLCOC SBVA PRE REF: 116.7 %
DLCOC SBVA REF: 4.45
DLCOC SINGLE BREATH LLN: 30.24
DLCOC SINGLE BREATH PRE REF: 90.9 %
DLCOC SINGLE BREATH REF: 37.17
DLCOCSBVAULN: 5.49
DLCOCSINGLEBREATHULN: 44.09
DLCOSINGLEBREATHULN: 44.09
DLCOVA LLN: 3.42
DLCOVA PRE REF: 110.7 %
DLCOVA PRE: 4.93 ML/(MIN*MMHG*L) (ref 3.42–5.49)
DLCOVA REF: 4.45
DLCOVAULN: 5.49
DLVAADJ PRE: 5.2 ML/(MIN*MMHG*L) (ref 3.42–5.49)
FEF 25 75 LLN: 2.47
FEF 25 75 PRE REF: 102.6 %
FEF 25 75 REF: 4.4
FEV05 LLN: 2.5
FEV05 REF: 3.63
FEV1 FVC LLN: 68
FEV1 FVC PRE REF: 103.5 %
FEV1 FVC REF: 79
FEV1 LLN: 3.85
FEV1 PRE REF: 88 %
FEV1 REF: 4.88
FVC LLN: 4.92
FVC PRE REF: 84.4 %
FVC REF: 6.24
IVC PRE: 4.99 L (ref 4.92–7.58)
IVC SINGLE BREATH LLN: 4.92
IVC SINGLE BREATH PRE REF: 80 %
IVC SINGLE BREATH REF: 6.24
IVCSINGLEBREATHULN: 7.58
MVV LLN: 153
MVV PRE REF: 99.8 %
MVV REF: 180
PEF LLN: 8.71
PEF PRE REF: 75.7 %
PEF REF: 11.44
PHYSICIAN COMMENT: ABNORMAL
PRE DLCO: 32.03 ML/(MIN*MMHG) (ref 30.24–44.09)
PRE FEF 25 75: 4.51 L/S (ref 2.47–6.88)
PRE FET 100: 7.16 SEC
PRE FEV05 REF: 93.1 %
PRE FEV1 FVC: 81.53 % (ref 68.3–87.67)
PRE FEV1: 4.3 L (ref 3.85–5.87)
PRE FEV5: 3.38 L (ref 2.5–4.77)
PRE FVC: 5.27 L (ref 4.92–7.58)
PRE MVV: 179.95 L/MIN (ref 153.19–207.26)
PRE PEF: 8.66 L/S (ref 8.71–14.17)
VA PRE: 6.49 L (ref 8.19–8.19)
VA SINGLE BREATH LLN: 8.19
VA SINGLE BREATH PRE REF: 79.3 %
VA SINGLE BREATH REF: 8.19
VASINGLEBREATHULN: 8.19

## 2022-09-11 NOTE — ANESTHESIA PREPROCEDURE EVALUATION
[facility]  Anesthesia Pre-Operative Evaluation         Patient Name: Alden Ledbetter  YOB: 1975  MRN: [id number]    SUBJECTIVE:     Pre-operative evaluation for Procedure(s) (LRB):  BENTALL PROCEDURE (N/A)     09/11/2022    Alden Ledbetter is a 47 y.o. male w/ a significant PMHx of GERD, HLD, HTN, bicuspid AV with moderate AR, and 5.4 x 5.3cm thoracic aortic aneurysm. Denies any SOB, fatigue, lower extremity swelling, dizziness, orthopnea, chest pain, or palpitations.    Patient now presents for planned hemiarch replacement, aortic root replacement, and mechanical valve.     TTE Summary 9/8/22:   The left ventricle is normal in size with normal systolic function.   The estimated ejection fraction is 63%.   Normal left ventricular diastolic function.   Normal right ventricular size with normal right ventricular systolic function.   Mild right atrial enlargement.   The aortic valve is bileaflet.   Mild aortic regurgitation.   Normal central venous pressure (3 mmHg).   The estimated PA systolic pressure is 31 mmHg.   The ascending aorta is moderate-severely dilated.   Trivial pericardial effusion. Under the RA.    [facility] Summary 8/24/22:  1. Severe AS AI with ascending aortic aneurysm.  2. Normal coronary       Prev airway: None documented.    LDA: None documented.     Drips: None documented.      Patient Active Problem List   Diagnosis    Essential hypertension    Male hypogonadism    Mixed hyperlipidemia    Aortic regurgitation due to bicuspid aortic valve    Thoracic aortic aneurysm (TAA)    Regular sinus bradycardia       Review of patient's allergies indicates:   Allergen Reactions    Dexlansoprazole     Prevacid [lansoprazole]        Current Inpatient Medications:      No current facility-administered medications on file prior to encounter.     Current Outpatient Medications on File Prior to Encounter   Medication Sig Dispense Refill     "amlodipine-olmesartan (FLO) 5-40 mg per tablet Take 1 tablet by mouth once daily. 90 tablet 3    cetirizine 10 mg Cap Zyrtec 10 mg capsule   Take 1 capsule every day by oral route.      EScitalopram oxalate (LEXAPRO) 5 MG Tab Take 1 tablet (5 mg total) by mouth once daily. 90 tablet 3    esomeprazole (NEXIUM) 40 MG capsule Take 20 mg by mouth before breakfast.      rosuvastatin (CRESTOR) 10 MG tablet TAKE 1 TABLET(10 MG) BY MOUTH EVERY DAY 90 tablet 3    syringe with needle 1 mL 25 gauge x 1" Syrg Inject 1 Syringe as directed every 7 days. Disp 1 box 12 each 12    testosterone cypionate (DEPOTESTOTERONE CYPIONATE) 200 mg/mL injection Inject 0.5 mLs (100 mg total) into the muscle every 7 days. 10 mL 0       Past Surgical History:   Procedure Laterality Date    ELBOW SURGERY Right     LEFT HEART CATHETERIZATION N/A 8/24/2022    Procedure: Left heart cath;  Surgeon: Felix Monsalve MD;  Location: [facility] CATH LAB;  Service: Cardiology;  Laterality: N/A;    NASAL SEPTUM SURGERY         Social History:  Tobacco Use: Low Risk     Smoking Tobacco Use: Never    Smokeless Tobacco Use: Never      Alcohol Use: Not on file        OBJECTIVE:     Vital Signs Range (Last 24H):         Significant Labs:  Lab Results   Component Value Date    WBC 6.42 09/08/2022    HGB 13.5 (L) 09/08/2022    HCT 42.4 09/08/2022     09/08/2022    CHOL 134 02/04/2022    TRIG 61 02/04/2022    HDL 44 02/04/2022    ALT 24 09/08/2022    AST 22 09/08/2022     09/08/2022    K 3.8 09/08/2022     09/08/2022    CREATININE 1.1 09/08/2022    BUN 12 09/08/2022    CO2 28 09/08/2022    TSH 1.370 06/10/2022    INR 1.0 09/08/2022    HGBA1C 5.4 09/08/2022       Diagnostic Studies: No relevant studies.    EKG:   Results for orders placed or performed during the hospital encounter of 09/08/22   EKG 12-lead    Collection Time: 09/08/22  2:10 PM    Narrative    Test Reason : I71.2,Q23.1,Z01.818,    Vent. Rate : 054 BPM     Atrial Rate : 054 " "BPM     P-R Int : 186 ms          QRS Dur : 096 ms      QT Int : 384 ms       P-R-T Axes : 050 017 028 degrees     QTc Int : 364 ms    Sinus bradycardia  Otherwise normal ECG  When compared with ECG of 24-AUG-2022 08:21,  No significant change was found  Confirmed by Michael Hilton MD ([id number]) on 9/8/2022 7:56:17 PM    Referred By: MARLA MCCLURE           Confirmed By:Michael Hilton MD       2D ECHO:  TTE:  Results for orders placed or performed during the hospital encounter of 09/08/22   Echo   Result Value Ref Range    BSA 2.37 m2    TDI SEPTAL 0.10 m/s    LV LATERAL E/E' RATIO 5.14 m/s    LV SEPTAL E/E' RATIO 7.20 m/s    LA WIDTH 4.60 cm    TDI LATERAL 0.14 m/s    LVIDd 5.20 3.5 - 6.0 cm    IVS 0.85 0.6 - 1.1 cm    Posterior Wall 1.00 0.6 - 1.1 cm    LVIDs 3.32 2.1 - 4.0 cm    FS 36 28 - 44 %    LA volume 62.58 cm3    Sinus 4.30 cm    STJ 3.87 cm    Ascending aorta 5.47 cm    LV mass 175.15 g    LA size 3.02 cm    RVDD 4.25 cm    TAPSE 2.64 cm    RV S' 16.75 cm/s    Left Ventricle Relative Wall Thickness 0.38 cm    AV mean gradient 14 mmHg    MV valve area p 1/2 method 3.75 cm2    E/A ratio 1.16     Mean e' 0.12 m/s    E wave deceleration time 202.09 msec    IVRT 68.51 msec    MV "A" wave duration 18.84 msec    Pulm vein S/D ratio 1.29     LVOT diameter 2.88 cm    LVOT area 6.5 cm2    Ao peak lissette 2.61 m/s    Ao VTI 58.43 cm    AV peak gradient 27 mmHg    E/E' ratio 6.00 m/s    MV Peak E Lissette 0.72 m/s    TR Max Lissette 2.65 m/s    MV stenosis pressure 1/2 time 58.61 ms    MV Peak A Lissette 0.62 m/s    PV Peak S Lissette 0.63 m/s    PV Peak D Lissette 0.49 m/s    LV Systolic Volume 44.84 mL    LV Systolic Volume Index 19.1 mL/m2    LV Diastolic Volume 129.51 mL    LV Diastolic Volume Index 55.11 mL/m2    LA Volume Index 26.6 mL/m2    LV Mass Index 75 g/m2    RA Major Axis 5.50 cm    Left Atrium Minor Axis 5.27 cm    Left Atrium Major Axis 5.33 cm    Triscuspid Valve Regurgitation Peak Gradient 28 mmHg    LA Volume Index (Mod) 37.0 mL/m2    " LA volume (mod) 86.86 cm3    RA Width 4.43 cm    Right Atrial Pressure (from IVC) 3 mmHg    EF 63 %    TV rest pulmonary artery pressure 31 mmHg    Narrative    · The left ventricle is normal in size with normal systolic function.  · The estimated ejection fraction is 63%.  · Normal left ventricular diastolic function.  · Normal right ventricular size with normal right ventricular systolic   function.  · Mild right atrial enlargement.  · The aortic valve is bileaflet.  · Mild aortic regurgitation.  · Normal central venous pressure (3 mmHg).  · The estimated PA systolic pressure is 31 mmHg.  · The ascending aorta is moderate-severely dilated.  · Trivial pericardial effusion. Under the RA.          GÓMEZ:  No results found for this or any previous visit.    ASSESSMENT/PLAN:           Pre-op Assessment    I have reviewed the Patient Summary Reports.     I have reviewed the Nursing Notes. I have reviewed the NPO Status.   I have reviewed the Medications.     Review of Systems  Anesthesia Hx:  No problems with previous Anesthesia  Denies Family Hx of Anesthesia complications.   Denies Personal Hx of Anesthesia complications.   Hematology/Oncology:  Hematology Normal   Oncology Normal     EENT/Dental:EENT/Dental Normal   Cardiovascular:   Exercise tolerance: good Hypertension Valvular problems/Murmurs    Pulmonary:  Pulmonary Normal    Renal/:  Renal/ Normal     Hepatic/GI:   GERD    Musculoskeletal:  Musculoskeletal Normal    Neurological:  Neurology Normal    Endocrine:  Endocrine Normal    Dermatological:  Skin Normal    Psych:  Psychiatric Normal           Physical Exam  General: Cooperative, Alert and Oriented    Airway:  Mallampati: II   Mouth Opening: Normal  TM Distance: Normal  Tongue: Normal  Neck ROM: Normal ROM    Dental:  Intact        Anesthesia Plan  Type of Anesthesia, risks & benefits discussed:    Anesthesia Type: Gen ETT  Intra-op Monitoring Plan: Standard ASA Monitors, Art Line, Central Line and  GÓMEZ  Post Op Pain Control Plan: multimodal analgesia, peripheral nerve block and IV/PO Opioids PRN  Induction:  IV  Airway Plan: Direct, Post-Induction  Informed Consent: Informed consent signed with the Patient and all parties understand the risks and agree with anesthesia plan.  All questions answered.   ASA Score: 4  Day of Surgery Review of History & Physical: H&P Update referred to the surgeon/provider.    Ready For Surgery From Anesthesia Perspective.     .

## 2022-09-12 ENCOUNTER — ANESTHESIA (OUTPATIENT)
Dept: SURGERY | Facility: HOSPITAL | Age: 47
DRG: 220 | End: 2022-09-12
Payer: COMMERCIAL

## 2022-09-12 ENCOUNTER — HOSPITAL ENCOUNTER (INPATIENT)
Facility: HOSPITAL | Age: 47
LOS: 4 days | Discharge: HOME | DRG: 220 | End: 2022-09-16
Attending: THORACIC SURGERY (CARDIOTHORACIC VASCULAR SURGERY) | Admitting: THORACIC SURGERY (CARDIOTHORACIC VASCULAR SURGERY)
Payer: COMMERCIAL

## 2022-09-12 DIAGNOSIS — Z98.890 HISTORY OF HEART SURGERY: ICD-10-CM

## 2022-09-12 DIAGNOSIS — R73.9 STRESS HYPERGLYCEMIA: Primary | ICD-10-CM

## 2022-09-12 DIAGNOSIS — E78.2 MIXED HYPERLIPIDEMIA: ICD-10-CM

## 2022-09-12 DIAGNOSIS — Z86.79 S/P ASCENDING AORTIC ANEURYSM REPAIR: Primary | ICD-10-CM

## 2022-09-12 DIAGNOSIS — Z95.2 S/P AORTIC VALVE REPLACEMENT: ICD-10-CM

## 2022-09-12 DIAGNOSIS — Q23.1 AORTIC REGURGITATION DUE TO BICUSPID AORTIC VALVE: ICD-10-CM

## 2022-09-12 DIAGNOSIS — I49.9 ARRHYTHMIA: ICD-10-CM

## 2022-09-12 DIAGNOSIS — Z98.890 S/P ASCENDING AORTIC ANEURYSM REPAIR: Primary | ICD-10-CM

## 2022-09-12 LAB
ABO + RH BLD: NORMAL
ALBUMIN SERPL BCP-MCNC: 2.5 G/DL (ref 3.5–5.2)
ALLENS TEST: ABNORMAL
ALP SERPL-CCNC: 34 U/L (ref 55–135)
ALT SERPL W/O P-5'-P-CCNC: 15 U/L (ref 10–44)
ANION GAP SERPL CALC-SCNC: 9 MMOL/L (ref 8–16)
APTT BLDCRRT: 24.4 SEC (ref 21–32)
AST SERPL-CCNC: 43 U/L (ref 10–40)
BASOPHILS # BLD AUTO: 0.05 K/UL (ref 0–0.2)
BASOPHILS NFR BLD: 0.2 % (ref 0–1.9)
BILIRUB SERPL-MCNC: 0.7 MG/DL (ref 0.1–1)
BLD GP AB SCN CELLS X3 SERPL QL: NORMAL
BUN SERPL-MCNC: 10 MG/DL (ref 6–20)
CALCIUM SERPL-MCNC: 7.2 MG/DL (ref 8.7–10.5)
CHLORIDE SERPL-SCNC: 108 MMOL/L (ref 95–110)
CO2 SERPL-SCNC: 21 MMOL/L (ref 23–29)
CREAT SERPL-MCNC: 0.8 MG/DL (ref 0.5–1.4)
DELSYS: ABNORMAL
DIFFERENTIAL METHOD: ABNORMAL
EOSINOPHIL # BLD AUTO: 0.1 K/UL (ref 0–0.5)
EOSINOPHIL NFR BLD: 0.2 % (ref 0–8)
ERYTHROCYTE [DISTWIDTH] IN BLOOD BY AUTOMATED COUNT: 18.2 % (ref 11.5–14.5)
ERYTHROCYTE [SEDIMENTATION RATE] IN BLOOD BY WESTERGREN METHOD: 22 MM/H
ERYTHROCYTE [SEDIMENTATION RATE] IN BLOOD BY WESTERGREN METHOD: 22 MM/H
EST. GFR  (NO RACE VARIABLE): >60 ML/MIN/1.73 M^2
FIO2: 100
FIO2: 100
FIO2: 40
FIO2: 40
FLOW: 2
FLOW: 2
GLUCOSE SERPL-MCNC: 154 MG/DL (ref 70–110)
GLUCOSE SERPL-MCNC: 174 MG/DL (ref 70–110)
GLUCOSE SERPL-MCNC: 175 MG/DL (ref 70–110)
GLUCOSE SERPL-MCNC: 175 MG/DL (ref 70–110)
GLUCOSE SERPL-MCNC: 177 MG/DL (ref 70–110)
GLUCOSE SERPL-MCNC: 184 MG/DL (ref 70–110)
HCO3 UR-SCNC: 21 MMOL/L (ref 24–28)
HCO3 UR-SCNC: 21.3 MMOL/L (ref 24–28)
HCO3 UR-SCNC: 23.9 MMOL/L (ref 24–28)
HCO3 UR-SCNC: 24.6 MMOL/L (ref 24–28)
HCO3 UR-SCNC: 26.3 MMOL/L (ref 24–28)
HCO3 UR-SCNC: 26.3 MMOL/L (ref 24–28)
HCO3 UR-SCNC: 26.4 MMOL/L (ref 24–28)
HCO3 UR-SCNC: 26.5 MMOL/L (ref 24–28)
HCT VFR BLD AUTO: 35.1 % (ref 40–54)
HCT VFR BLD CALC: 30 %PCV (ref 36–54)
HCT VFR BLD CALC: 33 %PCV (ref 36–54)
HCT VFR BLD CALC: 34 %PCV (ref 36–54)
HGB BLD-MCNC: 11.3 G/DL (ref 14–18)
IMM GRANULOCYTES # BLD AUTO: 0.14 K/UL (ref 0–0.04)
IMM GRANULOCYTES NFR BLD AUTO: 0.7 % (ref 0–0.5)
INR PPP: 1.3 (ref 0.8–1.2)
LDH SERPL L TO P-CCNC: 1.85 MMOL/L (ref 0.36–1.25)
LDH SERPL L TO P-CCNC: 2.18 MMOL/L (ref 0.36–1.25)
LDH SERPL L TO P-CCNC: 3.36 MMOL/L (ref 0.36–1.25)
LDH SERPL L TO P-CCNC: 5.03 MMOL/L (ref 0.36–1.25)
LYMPHOCYTES # BLD AUTO: 2.1 K/UL (ref 1–4.8)
LYMPHOCYTES NFR BLD: 9.8 % (ref 18–48)
MAGNESIUM SERPL-MCNC: 2 MG/DL (ref 1.6–2.6)
MAGNESIUM SERPL-MCNC: 2 MG/DL (ref 1.6–2.6)
MCH RBC QN AUTO: 24.4 PG (ref 27–31)
MCHC RBC AUTO-ENTMCNC: 32.2 G/DL (ref 32–36)
MCV RBC AUTO: 76 FL (ref 82–98)
MODE: ABNORMAL
MONOCYTES # BLD AUTO: 2 K/UL (ref 0.3–1)
MONOCYTES NFR BLD: 9.4 % (ref 4–15)
NEUTROPHILS # BLD AUTO: 16.9 K/UL (ref 1.8–7.7)
NEUTROPHILS NFR BLD: 79.7 % (ref 38–73)
NRBC BLD-RTO: 0 /100 WBC
PCO2 BLDA: 38.8 MMHG (ref 35–45)
PCO2 BLDA: 41 MMHG (ref 35–45)
PCO2 BLDA: 45.8 MMHG (ref 35–45)
PCO2 BLDA: 48.4 MMHG (ref 35–45)
PCO2 BLDA: 48.8 MMHG (ref 35–45)
PCO2 BLDA: 49.1 MMHG (ref 35–45)
PCO2 BLDA: 49.5 MMHG (ref 35–45)
PCO2 BLDA: 49.8 MMHG (ref 35–45)
PEEP: 5
PH SMN: 7.31 [PH] (ref 7.35–7.45)
PH SMN: 7.32 [PH] (ref 7.35–7.45)
PH SMN: 7.33 [PH] (ref 7.35–7.45)
PH SMN: 7.34 [PH] (ref 7.35–7.45)
PH SMN: 7.35 [PH] (ref 7.35–7.45)
PH SMN: 7.35 [PH] (ref 7.35–7.45)
PHOSPHATE SERPL-MCNC: 2.8 MG/DL (ref 2.7–4.5)
PHOSPHATE SERPL-MCNC: 2.8 MG/DL (ref 2.7–4.5)
PLATELET # BLD AUTO: 167 K/UL (ref 150–450)
PMV BLD AUTO: 9.2 FL (ref 9.2–12.9)
PO2 BLDA: 268 MMHG (ref 80–100)
PO2 BLDA: 271 MMHG (ref 80–100)
PO2 BLDA: 284 MMHG (ref 80–100)
PO2 BLDA: 337 MMHG (ref 80–100)
PO2 BLDA: 430 MMHG (ref 80–100)
PO2 BLDA: 88 MMHG (ref 80–100)
PO2 BLDA: 96 MMHG (ref 80–100)
PO2 BLDA: 99 MMHG (ref 80–100)
POC BE: -2 MMOL/L
POC BE: -2 MMOL/L
POC BE: -4 MMOL/L
POC BE: -5 MMOL/L
POC BE: 0 MMOL/L
POC BE: 0 MMOL/L
POC BE: 1 MMOL/L
POC BE: 1 MMOL/L
POC IONIZED CALCIUM: 0.96 MMOL/L (ref 1.06–1.42)
POC IONIZED CALCIUM: 0.99 MMOL/L (ref 1.06–1.42)
POC IONIZED CALCIUM: 1 MMOL/L (ref 1.06–1.42)
POC IONIZED CALCIUM: 1.01 MMOL/L (ref 1.06–1.42)
POC IONIZED CALCIUM: 1.02 MMOL/L (ref 1.06–1.42)
POC IONIZED CALCIUM: 1.05 MMOL/L (ref 1.06–1.42)
POC IONIZED CALCIUM: 1.06 MMOL/L (ref 1.06–1.42)
POC SATURATED O2: 100 % (ref 95–100)
POC SATURATED O2: 96 % (ref 95–100)
POC SATURATED O2: 97 % (ref 95–100)
POC SATURATED O2: 97 % (ref 95–100)
POC TCO2: 22 MMOL/L (ref 23–27)
POC TCO2: 22 MMOL/L (ref 23–27)
POC TCO2: 25 MMOL/L (ref 23–27)
POC TCO2: 26 MMOL/L (ref 23–27)
POC TCO2: 28 MMOL/L (ref 23–27)
POCT GLUCOSE: 107 MG/DL (ref 70–110)
POCT GLUCOSE: 107 MG/DL (ref 70–110)
POCT GLUCOSE: 124 MG/DL (ref 70–110)
POCT GLUCOSE: 137 MG/DL (ref 70–110)
POCT GLUCOSE: 148 MG/DL (ref 70–110)
POCT GLUCOSE: 148 MG/DL (ref 70–110)
POCT GLUCOSE: 153 MG/DL (ref 70–110)
POCT GLUCOSE: 166 MG/DL (ref 70–110)
POCT GLUCOSE: 167 MG/DL (ref 70–110)
POCT GLUCOSE: 173 MG/DL (ref 70–110)
POTASSIUM BLD-SCNC: 3.6 MMOL/L (ref 3.5–5.1)
POTASSIUM BLD-SCNC: 3.8 MMOL/L (ref 3.5–5.1)
POTASSIUM BLD-SCNC: 4.5 MMOL/L (ref 3.5–5.1)
POTASSIUM BLD-SCNC: 5.7 MMOL/L (ref 3.5–5.1)
POTASSIUM SERPL-SCNC: 3.6 MMOL/L (ref 3.5–5.1)
POTASSIUM SERPL-SCNC: 4.5 MMOL/L (ref 3.5–5.1)
PROT SERPL-MCNC: 4.5 G/DL (ref 6–8.4)
PROTHROMBIN TIME: 13.3 SEC (ref 9–12.5)
PS: 5
PS: 5
RBC # BLD AUTO: 4.64 M/UL (ref 4.6–6.2)
SAMPLE: ABNORMAL
SITE: ABNORMAL
SODIUM BLD-SCNC: 139 MMOL/L (ref 136–145)
SODIUM BLD-SCNC: 140 MMOL/L (ref 136–145)
SODIUM BLD-SCNC: 141 MMOL/L (ref 136–145)
SODIUM SERPL-SCNC: 138 MMOL/L (ref 136–145)
VT: 460
VT: 460
WBC # BLD AUTO: 21.21 K/UL (ref 3.9–12.7)

## 2022-09-12 PROCEDURE — 25000003 PHARM REV CODE 250: Performed by: PHYSICIAN ASSISTANT

## 2022-09-12 PROCEDURE — 94002 VENT MGMT INPAT INIT DAY: CPT

## 2022-09-12 PROCEDURE — 94799 UNLISTED PULMONARY SVC/PX: CPT

## 2022-09-12 PROCEDURE — P9045 ALBUMIN (HUMAN), 5%, 250 ML: HCPCS | Mod: JG | Performed by: PHYSICIAN ASSISTANT

## 2022-09-12 PROCEDURE — 94761 N-INVAS EAR/PLS OXIMETRY MLT: CPT

## 2022-09-12 PROCEDURE — 25000003 PHARM REV CODE 250: Performed by: STUDENT IN AN ORGANIZED HEALTH CARE EDUCATION/TRAINING PROGRAM

## 2022-09-12 PROCEDURE — 83605 ASSAY OF LACTIC ACID: CPT

## 2022-09-12 PROCEDURE — 93320 PR DOPPLER ECHO HEART,COMPLETE: ICD-10-PCS | Mod: 26,59,, | Performed by: ANESTHESIOLOGY

## 2022-09-12 PROCEDURE — 94010 BREATHING CAPACITY TEST: CPT

## 2022-09-12 PROCEDURE — 82800 BLOOD PH: CPT

## 2022-09-12 PROCEDURE — 27201423 OPTIME MED/SURG SUP & DEVICES STERILE SUPPLY: Performed by: THORACIC SURGERY (CARDIOTHORACIC VASCULAR SURGERY)

## 2022-09-12 PROCEDURE — 36556 INSERT NON-TUNNEL CV CATH: CPT | Mod: 59,,, | Performed by: ANESTHESIOLOGY

## 2022-09-12 PROCEDURE — 27100088 HC CELL SAVER

## 2022-09-12 PROCEDURE — 84132 ASSAY OF SERUM POTASSIUM: CPT | Performed by: PHYSICIAN ASSISTANT

## 2022-09-12 PROCEDURE — 63600175 PHARM REV CODE 636 W HCPCS: Performed by: STUDENT IN AN ORGANIZED HEALTH CARE EDUCATION/TRAINING PROGRAM

## 2022-09-12 PROCEDURE — 27000221 HC OXYGEN, UP TO 24 HOURS

## 2022-09-12 PROCEDURE — 36620 INSERTION CATHETER ARTERY: CPT | Mod: 59,,, | Performed by: ANESTHESIOLOGY

## 2022-09-12 PROCEDURE — 85730 THROMBOPLASTIN TIME PARTIAL: CPT | Performed by: PHYSICIAN ASSISTANT

## 2022-09-12 PROCEDURE — 88305 TISSUE EXAM BY PATHOLOGIST: CPT | Mod: 26,,, | Performed by: PATHOLOGY

## 2022-09-12 PROCEDURE — 84295 ASSAY OF SERUM SODIUM: CPT

## 2022-09-12 PROCEDURE — 36620 ARTERIAL: ICD-10-PCS | Mod: 59,,, | Performed by: ANESTHESIOLOGY

## 2022-09-12 PROCEDURE — 33863 PR ASCEND AORTA GRAFT W ROOT REPLACMENT.VALVE CONDUIT/CORON RECONSTRUCT: ICD-10-PCS | Mod: ,,, | Performed by: THORACIC SURGERY (CARDIOTHORACIC VASCULAR SURGERY)

## 2022-09-12 PROCEDURE — 27000191 HC C-V MONITORING

## 2022-09-12 PROCEDURE — 84132 ASSAY OF SERUM POTASSIUM: CPT

## 2022-09-12 PROCEDURE — 86901 BLOOD TYPING SEROLOGIC RH(D): CPT | Performed by: PHYSICIAN ASSISTANT

## 2022-09-12 PROCEDURE — C1729 CATH, DRAINAGE: HCPCS | Performed by: THORACIC SURGERY (CARDIOTHORACIC VASCULAR SURGERY)

## 2022-09-12 PROCEDURE — 82803 BLOOD GASES ANY COMBINATION: CPT

## 2022-09-12 PROCEDURE — C2618 PROBE/NEEDLE, CRYO: HCPCS | Performed by: THORACIC SURGERY (CARDIOTHORACIC VASCULAR SURGERY)

## 2022-09-12 PROCEDURE — C9248 INJ, CLEVIDIPINE BUTYRATE: HCPCS | Mod: JG

## 2022-09-12 PROCEDURE — 36556 CENTRAL LINE: ICD-10-PCS | Mod: 59,,, | Performed by: ANESTHESIOLOGY

## 2022-09-12 PROCEDURE — 82330 ASSAY OF CALCIUM: CPT

## 2022-09-12 PROCEDURE — 93312 PR ECHO HEART,TRANSESOPHAGEAL: ICD-10-PCS | Mod: 26,59,, | Performed by: ANESTHESIOLOGY

## 2022-09-12 PROCEDURE — D9220A PRA ANESTHESIA: Mod: ,,, | Performed by: ANESTHESIOLOGY

## 2022-09-12 PROCEDURE — 27200953 HC CARDIOPLEGIA SYSTEM

## 2022-09-12 PROCEDURE — 84100 ASSAY OF PHOSPHORUS: CPT | Performed by: PHYSICIAN ASSISTANT

## 2022-09-12 PROCEDURE — 27801617 HC VALVE, PERCEVAL

## 2022-09-12 PROCEDURE — 94640 AIRWAY INHALATION TREATMENT: CPT

## 2022-09-12 PROCEDURE — 63600175 PHARM REV CODE 636 W HCPCS: Mod: JG

## 2022-09-12 PROCEDURE — 20000000 HC ICU ROOM

## 2022-09-12 PROCEDURE — 80053 COMPREHEN METABOLIC PANEL: CPT | Performed by: STUDENT IN AN ORGANIZED HEALTH CARE EDUCATION/TRAINING PROGRAM

## 2022-09-12 PROCEDURE — 88305 TISSUE EXAM BY PATHOLOGIST: ICD-10-PCS | Mod: 26,,, | Performed by: PATHOLOGY

## 2022-09-12 PROCEDURE — C1768 GRAFT, VASCULAR: HCPCS | Performed by: THORACIC SURGERY (CARDIOTHORACIC VASCULAR SURGERY)

## 2022-09-12 PROCEDURE — 85014 HEMATOCRIT: CPT

## 2022-09-12 PROCEDURE — 83735 ASSAY OF MAGNESIUM: CPT | Performed by: PHYSICIAN ASSISTANT

## 2022-09-12 PROCEDURE — 85025 COMPLETE CBC W/AUTO DIFF WBC: CPT | Performed by: PHYSICIAN ASSISTANT

## 2022-09-12 PROCEDURE — D9220A PRA ANESTHESIA: ICD-10-PCS | Mod: ,,, | Performed by: ANESTHESIOLOGY

## 2022-09-12 PROCEDURE — 99900017 HC EXTUBATION W/PARAMETERS (STAT)

## 2022-09-12 PROCEDURE — 99499 UNLISTED E&M SERVICE: CPT | Mod: ,,, | Performed by: ANESTHESIOLOGY

## 2022-09-12 PROCEDURE — 37000009 HC ANESTHESIA EA ADD 15 MINS: Performed by: THORACIC SURGERY (CARDIOTHORACIC VASCULAR SURGERY)

## 2022-09-12 PROCEDURE — 93010 EKG 12-LEAD: ICD-10-PCS | Mod: ,,, | Performed by: INTERNAL MEDICINE

## 2022-09-12 PROCEDURE — 27000175 HC ADULT BYPASS PUMP

## 2022-09-12 PROCEDURE — 37799 UNLISTED PX VASCULAR SURGERY: CPT

## 2022-09-12 PROCEDURE — 93320 DOPPLER ECHO COMPLETE: CPT | Mod: 26,59,, | Performed by: ANESTHESIOLOGY

## 2022-09-12 PROCEDURE — 99223 1ST HOSP IP/OBS HIGH 75: CPT | Mod: ,,, | Performed by: NURSE PRACTITIONER

## 2022-09-12 PROCEDURE — 63600175 PHARM REV CODE 636 W HCPCS: Performed by: PHYSICIAN ASSISTANT

## 2022-09-12 PROCEDURE — 93325 PR DOPPLER COLOR FLOW VELOCITY MAP: ICD-10-PCS | Mod: 26,59,, | Performed by: ANESTHESIOLOGY

## 2022-09-12 PROCEDURE — 25000003 PHARM REV CODE 250: Performed by: THORACIC SURGERY (CARDIOTHORACIC VASCULAR SURGERY)

## 2022-09-12 PROCEDURE — 88305 TISSUE EXAM BY PATHOLOGIST: CPT | Performed by: PATHOLOGY

## 2022-09-12 PROCEDURE — 85520 HEPARIN ASSAY: CPT

## 2022-09-12 PROCEDURE — 82565 ASSAY OF CREATININE: CPT

## 2022-09-12 PROCEDURE — 36000712 HC OR TIME LEV V 1ST 15 MIN: Performed by: THORACIC SURGERY (CARDIOTHORACIC VASCULAR SURGERY)

## 2022-09-12 PROCEDURE — 93010 ELECTROCARDIOGRAM REPORT: CPT | Mod: ,,, | Performed by: INTERNAL MEDICINE

## 2022-09-12 PROCEDURE — 93312 ECHO TRANSESOPHAGEAL: CPT | Mod: 26,59,, | Performed by: ANESTHESIOLOGY

## 2022-09-12 PROCEDURE — 93005 ELECTROCARDIOGRAM TRACING: CPT

## 2022-09-12 PROCEDURE — 36000713 HC OR TIME LEV V EA ADD 15 MIN: Performed by: THORACIC SURGERY (CARDIOTHORACIC VASCULAR SURGERY)

## 2022-09-12 PROCEDURE — 25000242 PHARM REV CODE 250 ALT 637 W/ HCPCS: Performed by: PHYSICIAN ASSISTANT

## 2022-09-12 PROCEDURE — 86920 COMPATIBILITY TEST SPIN: CPT | Performed by: STUDENT IN AN ORGANIZED HEALTH CARE EDUCATION/TRAINING PROGRAM

## 2022-09-12 PROCEDURE — 93325 DOPPLER ECHO COLOR FLOW MAPG: CPT | Mod: 26,59,, | Performed by: ANESTHESIOLOGY

## 2022-09-12 PROCEDURE — 94150 VITAL CAPACITY TEST: CPT

## 2022-09-12 PROCEDURE — 99499 NO LOS: ICD-10-PCS | Mod: ,,, | Performed by: ANESTHESIOLOGY

## 2022-09-12 PROCEDURE — 99900035 HC TECH TIME PER 15 MIN (STAT)

## 2022-09-12 PROCEDURE — 37000008 HC ANESTHESIA 1ST 15 MINUTES: Performed by: THORACIC SURGERY (CARDIOTHORACIC VASCULAR SURGERY)

## 2022-09-12 PROCEDURE — 27201037 HC PRESSURE MONITORING SET UP

## 2022-09-12 PROCEDURE — 27100026 HC SHUNT SENSOR, TERUMO

## 2022-09-12 PROCEDURE — 85610 PROTHROMBIN TIME: CPT | Performed by: PHYSICIAN ASSISTANT

## 2022-09-12 PROCEDURE — 33863 ASCENDING AORTIC GRAFT: CPT | Mod: ,,, | Performed by: THORACIC SURGERY (CARDIOTHORACIC VASCULAR SURGERY)

## 2022-09-12 PROCEDURE — 25000003 PHARM REV CODE 250

## 2022-09-12 PROCEDURE — 27202608 HC CANNULA, MISC

## 2022-09-12 PROCEDURE — 99223 PR INITIAL HOSPITAL CARE,LEVL III: ICD-10-PCS | Mod: ,,, | Performed by: NURSE PRACTITIONER

## 2022-09-12 DEVICE — FELT TEFLON 1INX6IN: Type: IMPLANTABLE DEVICE | Site: HEART | Status: FUNCTIONAL

## 2022-09-12 DEVICE — IMPLANTABLE DEVICE
Type: IMPLANTABLE DEVICE | Site: HEART | Status: FUNCTIONAL
Brand: CARBOMEDICS CARBO-SEAL VALSALVA

## 2022-09-12 RX ORDER — ROCURONIUM BROMIDE 10 MG/ML
INJECTION, SOLUTION INTRAVENOUS
Status: DISCONTINUED | OUTPATIENT
Start: 2022-09-12 | End: 2022-09-12

## 2022-09-12 RX ORDER — IPRATROPIUM BROMIDE AND ALBUTEROL SULFATE 2.5; .5 MG/3ML; MG/3ML
3 SOLUTION RESPIRATORY (INHALATION) EVERY 4 HOURS
Status: DISCONTINUED | OUTPATIENT
Start: 2022-09-12 | End: 2022-09-13

## 2022-09-12 RX ORDER — TRANEXAMIC ACID 100 MG/ML
INJECTION, SOLUTION INTRAVENOUS
Status: DISCONTINUED | OUTPATIENT
Start: 2022-09-12 | End: 2022-09-12

## 2022-09-12 RX ORDER — ACETAMINOPHEN 325 MG/1
650 TABLET ORAL EVERY 4 HOURS PRN
Status: DISCONTINUED | OUTPATIENT
Start: 2022-09-12 | End: 2022-09-12

## 2022-09-12 RX ORDER — PROCHLORPERAZINE EDISYLATE 5 MG/ML
5 INJECTION INTRAMUSCULAR; INTRAVENOUS ONCE
Status: COMPLETED | OUTPATIENT
Start: 2022-09-12 | End: 2022-09-12

## 2022-09-12 RX ORDER — NOREPINEPHRINE BITARTRATE 1 MG/ML
INJECTION, SOLUTION INTRAVENOUS
Status: DISCONTINUED | OUTPATIENT
Start: 2022-09-12 | End: 2022-09-12

## 2022-09-12 RX ORDER — ACETAMINOPHEN 500 MG
1000 TABLET ORAL EVERY 8 HOURS
Status: DISCONTINUED | OUTPATIENT
Start: 2022-09-12 | End: 2022-09-16 | Stop reason: HOSPADM

## 2022-09-12 RX ORDER — PROPOFOL 10 MG/ML
0-50 INJECTION, EMULSION INTRAVENOUS CONTINUOUS
Status: DISCONTINUED | OUTPATIENT
Start: 2022-09-12 | End: 2022-09-12

## 2022-09-12 RX ORDER — BISACODYL 10 MG
10 SUPPOSITORY, RECTAL RECTAL DAILY PRN
Status: DISCONTINUED | OUTPATIENT
Start: 2022-09-12 | End: 2022-09-16 | Stop reason: HOSPADM

## 2022-09-12 RX ORDER — POLYETHYLENE GLYCOL 3350 17 G/17G
17 POWDER, FOR SOLUTION ORAL DAILY
Status: DISCONTINUED | OUTPATIENT
Start: 2022-09-12 | End: 2022-09-16 | Stop reason: HOSPADM

## 2022-09-12 RX ORDER — DEXTROSE MONOHYDRATE, SODIUM CHLORIDE, AND POTASSIUM CHLORIDE 50; 1.49; 4.5 G/1000ML; G/1000ML; G/1000ML
INJECTION, SOLUTION INTRAVENOUS CONTINUOUS
Status: DISCONTINUED | OUTPATIENT
Start: 2022-09-12 | End: 2022-09-15

## 2022-09-12 RX ORDER — DEXMEDETOMIDINE HYDROCHLORIDE 4 UG/ML
0-1.4 INJECTION, SOLUTION INTRAVENOUS CONTINUOUS
Status: DISCONTINUED | OUTPATIENT
Start: 2022-09-12 | End: 2022-09-13

## 2022-09-12 RX ORDER — METOPROLOL TARTRATE 25 MG/1
25 TABLET, FILM COATED ORAL
Status: DISCONTINUED | OUTPATIENT
Start: 2022-09-12 | End: 2022-09-12 | Stop reason: HOSPADM

## 2022-09-12 RX ORDER — PROPOFOL 10 MG/ML
VIAL (ML) INTRAVENOUS
Status: DISCONTINUED | OUTPATIENT
Start: 2022-09-12 | End: 2022-09-12

## 2022-09-12 RX ORDER — MIDAZOLAM HYDROCHLORIDE 1 MG/ML
INJECTION, SOLUTION INTRAMUSCULAR; INTRAVENOUS
Status: DISCONTINUED | OUTPATIENT
Start: 2022-09-12 | End: 2022-09-12

## 2022-09-12 RX ORDER — OXYCODONE HYDROCHLORIDE 10 MG/1
10 TABLET ORAL EVERY 4 HOURS PRN
Status: DISCONTINUED | OUTPATIENT
Start: 2022-09-12 | End: 2022-09-16 | Stop reason: HOSPADM

## 2022-09-12 RX ORDER — MAGNESIUM SULFATE HEPTAHYDRATE 40 MG/ML
4 INJECTION, SOLUTION INTRAVENOUS
Status: DISCONTINUED | OUTPATIENT
Start: 2022-09-12 | End: 2022-09-13

## 2022-09-12 RX ORDER — FENTANYL CITRATE 50 UG/ML
25 INJECTION, SOLUTION INTRAMUSCULAR; INTRAVENOUS
Status: DISCONTINUED | OUTPATIENT
Start: 2022-09-12 | End: 2022-09-12

## 2022-09-12 RX ORDER — LIDOCAINE HYDROCHLORIDE 20 MG/ML
INJECTION, SOLUTION EPIDURAL; INFILTRATION; INTRACAUDAL; PERINEURAL
Status: DISCONTINUED | OUTPATIENT
Start: 2022-09-12 | End: 2022-09-12

## 2022-09-12 RX ORDER — FENTANYL CITRATE 50 UG/ML
INJECTION, SOLUTION INTRAMUSCULAR; INTRAVENOUS
Status: DISCONTINUED | OUTPATIENT
Start: 2022-09-12 | End: 2022-09-12

## 2022-09-12 RX ORDER — FENTANYL CITRATE 50 UG/ML
50 INJECTION, SOLUTION INTRAMUSCULAR; INTRAVENOUS
Status: DISCONTINUED | OUTPATIENT
Start: 2022-09-15 | End: 2022-09-13

## 2022-09-12 RX ORDER — MUPIROCIN 20 MG/G
1 OINTMENT TOPICAL 2 TIMES DAILY
Status: DISCONTINUED | OUTPATIENT
Start: 2022-09-12 | End: 2022-09-16 | Stop reason: HOSPADM

## 2022-09-12 RX ORDER — PROPOFOL 10 MG/ML
VIAL (ML) INTRAVENOUS CONTINUOUS PRN
Status: DISCONTINUED | OUTPATIENT
Start: 2022-09-12 | End: 2022-09-12

## 2022-09-12 RX ORDER — CEFAZOLIN SODIUM/D5W 2 G/100 ML
2 PLASTIC BAG, INJECTION (ML) INTRAVENOUS
Status: COMPLETED | OUTPATIENT
Start: 2022-09-12 | End: 2022-09-14

## 2022-09-12 RX ORDER — SODIUM CHLORIDE 9 MG/ML
INJECTION, SOLUTION INTRAVENOUS CONTINUOUS
Status: DISCONTINUED | OUTPATIENT
Start: 2022-09-12 | End: 2022-09-15

## 2022-09-12 RX ORDER — ASPIRIN 300 MG/1
300 SUPPOSITORY RECTAL ONCE AS NEEDED
Status: DISCONTINUED | OUTPATIENT
Start: 2022-09-12 | End: 2022-09-13

## 2022-09-12 RX ORDER — MAGNESIUM SULFATE HEPTAHYDRATE 40 MG/ML
2 INJECTION, SOLUTION INTRAVENOUS
Status: DISCONTINUED | OUTPATIENT
Start: 2022-09-12 | End: 2022-09-13

## 2022-09-12 RX ORDER — POTASSIUM CHLORIDE 29.8 MG/ML
40 INJECTION INTRAVENOUS
Status: DISCONTINUED | OUTPATIENT
Start: 2022-09-12 | End: 2022-09-13

## 2022-09-12 RX ORDER — LIDOCAINE 50 MG/G
1 PATCH TOPICAL
Status: DISCONTINUED | OUTPATIENT
Start: 2022-09-12 | End: 2022-09-13

## 2022-09-12 RX ORDER — FENTANYL CITRATE 50 UG/ML
25 INJECTION, SOLUTION INTRAMUSCULAR; INTRAVENOUS
Status: DISCONTINUED | OUTPATIENT
Start: 2022-09-12 | End: 2022-09-13

## 2022-09-12 RX ORDER — ACETAMINOPHEN 500 MG
1000 TABLET ORAL ONCE
Status: COMPLETED | OUTPATIENT
Start: 2022-09-12 | End: 2022-09-12

## 2022-09-12 RX ORDER — PROTAMINE SULFATE 10 MG/ML
INJECTION, SOLUTION INTRAVENOUS
Status: DISCONTINUED | OUTPATIENT
Start: 2022-09-12 | End: 2022-09-12

## 2022-09-12 RX ORDER — NITROGLYCERIN 5 MG/ML
INJECTION, SOLUTION INTRAVENOUS
Status: DISCONTINUED | OUTPATIENT
Start: 2022-09-12 | End: 2022-09-12

## 2022-09-12 RX ORDER — HYDROMORPHONE HYDROCHLORIDE 1 MG/ML
0.5 INJECTION, SOLUTION INTRAMUSCULAR; INTRAVENOUS; SUBCUTANEOUS EVERY 6 HOURS PRN
Status: DISCONTINUED | OUTPATIENT
Start: 2022-09-12 | End: 2022-09-13

## 2022-09-12 RX ORDER — LIDOCAINE HYDROCHLORIDE 10 MG/ML
1 INJECTION, SOLUTION EPIDURAL; INFILTRATION; INTRACAUDAL; PERINEURAL
Status: DISCONTINUED | OUTPATIENT
Start: 2022-09-12 | End: 2022-09-12 | Stop reason: HOSPADM

## 2022-09-12 RX ORDER — HEPARIN SODIUM 1000 [USP'U]/ML
INJECTION, SOLUTION INTRAVENOUS; SUBCUTANEOUS
Status: DISCONTINUED | OUTPATIENT
Start: 2022-09-12 | End: 2022-09-12

## 2022-09-12 RX ORDER — ASPIRIN 325 MG
325 TABLET ORAL ONCE
Status: COMPLETED | OUTPATIENT
Start: 2022-09-12 | End: 2022-09-12

## 2022-09-12 RX ORDER — KETAMINE HCL IN 0.9 % NACL 50 MG/5 ML
SYRINGE (ML) INTRAVENOUS
Status: DISCONTINUED | OUTPATIENT
Start: 2022-09-12 | End: 2022-09-12

## 2022-09-12 RX ORDER — POTASSIUM CHLORIDE 14.9 MG/ML
20 INJECTION INTRAVENOUS
Status: DISCONTINUED | OUTPATIENT
Start: 2022-09-12 | End: 2022-09-13

## 2022-09-12 RX ORDER — DEXMEDETOMIDINE HYDROCHLORIDE 4 UG/ML
0-1.4 INJECTION, SOLUTION INTRAVENOUS CONTINUOUS
Status: DISCONTINUED | OUTPATIENT
Start: 2022-09-12 | End: 2022-09-12

## 2022-09-12 RX ORDER — ASPIRIN 325 MG
325 TABLET ORAL DAILY
Status: DISCONTINUED | OUTPATIENT
Start: 2022-09-13 | End: 2022-09-14

## 2022-09-12 RX ORDER — METOCLOPRAMIDE HYDROCHLORIDE 5 MG/ML
5 INJECTION INTRAMUSCULAR; INTRAVENOUS EVERY 6 HOURS PRN
Status: DISCONTINUED | OUTPATIENT
Start: 2022-09-12 | End: 2022-09-16 | Stop reason: HOSPADM

## 2022-09-12 RX ORDER — FAMOTIDINE 10 MG/ML
20 INJECTION INTRAVENOUS 2 TIMES DAILY
Status: DISCONTINUED | OUTPATIENT
Start: 2022-09-12 | End: 2022-09-13

## 2022-09-12 RX ORDER — ONDANSETRON 2 MG/ML
4 INJECTION INTRAMUSCULAR; INTRAVENOUS EVERY 12 HOURS PRN
Status: DISCONTINUED | OUTPATIENT
Start: 2022-09-12 | End: 2022-09-16 | Stop reason: HOSPADM

## 2022-09-12 RX ORDER — POTASSIUM CHLORIDE 14.9 MG/ML
60 INJECTION INTRAVENOUS
Status: DISCONTINUED | OUTPATIENT
Start: 2022-09-12 | End: 2022-09-13

## 2022-09-12 RX ORDER — METHOCARBAMOL 500 MG/1
500 TABLET, FILM COATED ORAL 4 TIMES DAILY
Status: DISCONTINUED | OUTPATIENT
Start: 2022-09-12 | End: 2022-09-16 | Stop reason: HOSPADM

## 2022-09-12 RX ORDER — IPRATROPIUM BROMIDE AND ALBUTEROL SULFATE 2.5; .5 MG/3ML; MG/3ML
3 SOLUTION RESPIRATORY (INHALATION) EVERY 4 HOURS PRN
Status: ACTIVE | OUTPATIENT
Start: 2022-09-12 | End: 2022-09-13

## 2022-09-12 RX ORDER — CALCIUM CHLORIDE INJECTION 100 MG/ML
INJECTION, SOLUTION INTRAVENOUS
Status: DISCONTINUED | OUTPATIENT
Start: 2022-09-12 | End: 2022-09-12

## 2022-09-12 RX ORDER — DOCUSATE SODIUM 100 MG/1
100 CAPSULE, LIQUID FILLED ORAL 2 TIMES DAILY
Status: DISCONTINUED | OUTPATIENT
Start: 2022-09-12 | End: 2022-09-16 | Stop reason: HOSPADM

## 2022-09-12 RX ORDER — CEFAZOLIN SODIUM/WATER 2 G/20 ML
2 SYRINGE (ML) INTRAVENOUS
Status: COMPLETED | OUTPATIENT
Start: 2022-09-12 | End: 2022-09-12

## 2022-09-12 RX ORDER — NEOSTIGMINE METHYLSULFATE 0.5 MG/ML
INJECTION, SOLUTION INTRAVENOUS
Status: DISCONTINUED | OUTPATIENT
Start: 2022-09-12 | End: 2022-09-12

## 2022-09-12 RX ORDER — SODIUM CHLORIDE 0.9 % (FLUSH) 0.9 %
10 SYRINGE (ML) INJECTION
Status: DISCONTINUED | OUTPATIENT
Start: 2022-09-12 | End: 2022-09-16 | Stop reason: HOSPADM

## 2022-09-12 RX ORDER — TRANEXAMIC ACID 100 MG/ML
INJECTION, SOLUTION INTRAVENOUS CONTINUOUS PRN
Status: DISCONTINUED | OUTPATIENT
Start: 2022-09-12 | End: 2022-09-12

## 2022-09-12 RX ORDER — ALBUMIN HUMAN 50 G/1000ML
25 SOLUTION INTRAVENOUS ONCE AS NEEDED
Status: COMPLETED | OUTPATIENT
Start: 2022-09-12 | End: 2022-09-12

## 2022-09-12 RX ORDER — MUPIROCIN 20 MG/G
1 OINTMENT TOPICAL
Status: COMPLETED | OUTPATIENT
Start: 2022-09-12 | End: 2022-09-12

## 2022-09-12 RX ORDER — OXYCODONE HYDROCHLORIDE 5 MG/1
5 TABLET ORAL EVERY 4 HOURS PRN
Status: DISCONTINUED | OUTPATIENT
Start: 2022-09-12 | End: 2022-09-16 | Stop reason: HOSPADM

## 2022-09-12 RX ADMIN — OXYCODONE HYDROCHLORIDE 10 MG: 10 TABLET ORAL at 08:09

## 2022-09-12 RX ADMIN — CALCIUM CHLORIDE 500 MG: 100 INJECTION, SOLUTION INTRAVENOUS at 12:09

## 2022-09-12 RX ADMIN — HEPARIN SODIUM 42000 UNITS: 1000 INJECTION, SOLUTION INTRAVENOUS; SUBCUTANEOUS at 09:09

## 2022-09-12 RX ADMIN — Medication 50 MCG/KG/MIN: at 12:09

## 2022-09-12 RX ADMIN — MIDAZOLAM 2 MG: 1 INJECTION INTRAMUSCULAR; INTRAVENOUS at 06:09

## 2022-09-12 RX ADMIN — CEFAZOLIN 2 G: 10 INJECTION, POWDER, FOR SOLUTION INTRAVENOUS at 08:09

## 2022-09-12 RX ADMIN — SODIUM CHLORIDE 1 MG/KG/HR: 9 INJECTION, SOLUTION INTRAVENOUS at 07:09

## 2022-09-12 RX ADMIN — LIDOCAINE 1 PATCH: 50 PATCH CUTANEOUS at 08:09

## 2022-09-12 RX ADMIN — NOREPINEPHRINE BITARTRATE 4 MCG: 1 INJECTION, SOLUTION, CONCENTRATE INTRAVENOUS at 09:09

## 2022-09-12 RX ADMIN — ROCURONIUM BROMIDE 50 MG: 10 INJECTION INTRAVENOUS at 09:09

## 2022-09-12 RX ADMIN — ACETAMINOPHEN 1000 MG: 500 TABLET ORAL at 06:09

## 2022-09-12 RX ADMIN — PROCHLORPERAZINE EDISYLATE 5 MG: 5 INJECTION INTRAMUSCULAR; INTRAVENOUS at 08:09

## 2022-09-12 RX ADMIN — LIDOCAINE HYDROCHLORIDE 80 MG: 20 INJECTION, SOLUTION EPIDURAL; INFILTRATION; INTRACAUDAL; PERINEURAL at 07:09

## 2022-09-12 RX ADMIN — IPRATROPIUM BROMIDE AND ALBUTEROL SULFATE 3 ML: 2.5; .5 SOLUTION RESPIRATORY (INHALATION) at 11:09

## 2022-09-12 RX ADMIN — ACETAMINOPHEN 1000 MG: 325 TABLET ORAL at 03:09

## 2022-09-12 RX ADMIN — MUPIROCIN 1 G: 20 OINTMENT TOPICAL at 08:09

## 2022-09-12 RX ADMIN — ROCURONIUM BROMIDE 50 MG: 10 INJECTION INTRAVENOUS at 10:09

## 2022-09-12 RX ADMIN — FAMOTIDINE 20 MG: 10 INJECTION INTRAVENOUS at 08:09

## 2022-09-12 RX ADMIN — FENTANYL CITRATE 50 MCG: 0.05 INJECTION, SOLUTION INTRAMUSCULAR; INTRAVENOUS at 12:09

## 2022-09-12 RX ADMIN — NOREPINEPHRINE BITARTRATE 0.02 MCG/KG/MIN: 1 INJECTION, SOLUTION, CONCENTRATE INTRAVENOUS at 08:09

## 2022-09-12 RX ADMIN — METHOCARBAMOL 500 MG: 500 TABLET ORAL at 08:09

## 2022-09-12 RX ADMIN — PROPOFOL 200 MG: 10 INJECTION, EMULSION INTRAVENOUS at 07:09

## 2022-09-12 RX ADMIN — DEXTROSE, SODIUM CHLORIDE, AND POTASSIUM CHLORIDE: 5; .45; .15 INJECTION INTRAVENOUS at 03:09

## 2022-09-12 RX ADMIN — TRANEXAMIC ACID 1000 MG: 100 INJECTION, SOLUTION INTRAVENOUS at 12:09

## 2022-09-12 RX ADMIN — HYDROMORPHONE HYDROCHLORIDE 0.5 MG: 1 INJECTION, SOLUTION INTRAMUSCULAR; INTRAVENOUS; SUBCUTANEOUS at 04:09

## 2022-09-12 RX ADMIN — ONDANSETRON 4 MG: 2 INJECTION INTRAMUSCULAR; INTRAVENOUS at 07:09

## 2022-09-12 RX ADMIN — ALBUMIN (HUMAN) 25 G: 12.5 SOLUTION INTRAVENOUS at 02:09

## 2022-09-12 RX ADMIN — ROCURONIUM BROMIDE 20 MG: 10 INJECTION INTRAVENOUS at 01:09

## 2022-09-12 RX ADMIN — MUPIROCIN 1 G: 20 OINTMENT TOPICAL at 06:09

## 2022-09-12 RX ADMIN — PROTAMINE SULFATE 370 MG: 10 INJECTION, SOLUTION INTRAVENOUS at 12:09

## 2022-09-12 RX ADMIN — DEXMEDETOMIDINE HYDROCHLORIDE 0.4 MCG/KG/HR: 4 INJECTION INTRAVENOUS at 02:09

## 2022-09-12 RX ADMIN — HYDROMORPHONE HYDROCHLORIDE 0.5 MG: 1 INJECTION, SOLUTION INTRAMUSCULAR; INTRAVENOUS; SUBCUTANEOUS at 09:09

## 2022-09-12 RX ADMIN — ACETAMINOPHEN 1000 MG: 325 TABLET ORAL at 09:09

## 2022-09-12 RX ADMIN — EPINEPHRINE 0.04 MCG/KG/MIN: 1 INJECTION INTRAMUSCULAR; INTRAVENOUS; SUBCUTANEOUS at 11:09

## 2022-09-12 RX ADMIN — ROCURONIUM BROMIDE 20 MG: 10 INJECTION INTRAVENOUS at 11:09

## 2022-09-12 RX ADMIN — Medication 2 G: at 07:09

## 2022-09-12 RX ADMIN — FENTANYL CITRATE 50 MCG: 50 INJECTION INTRAMUSCULAR; INTRAVENOUS at 02:09

## 2022-09-12 RX ADMIN — GLYCOPYRROLATE 0.2 MG: 0.2 INJECTION INTRAMUSCULAR; INTRAVENOUS at 09:09

## 2022-09-12 RX ADMIN — NITROGLYCERIN 25 MCG: 5 INJECTION, SOLUTION INTRAVENOUS at 09:09

## 2022-09-12 RX ADMIN — SODIUM CHLORIDE: 0.9 INJECTION, SOLUTION INTRAVENOUS at 06:09

## 2022-09-12 RX ADMIN — POTASSIUM CHLORIDE 40 MEQ: 29.8 INJECTION, SOLUTION INTRAVENOUS at 03:09

## 2022-09-12 RX ADMIN — Medication 2 G: at 11:09

## 2022-09-12 RX ADMIN — FENTANYL CITRATE 50 MCG: 0.05 INJECTION, SOLUTION INTRAMUSCULAR; INTRAVENOUS at 08:09

## 2022-09-12 RX ADMIN — ROCURONIUM BROMIDE 100 MG: 10 INJECTION INTRAVENOUS at 07:09

## 2022-09-12 RX ADMIN — Medication 30 MG: at 07:09

## 2022-09-12 RX ADMIN — CLEVIPIDINE 4 MG/HR: 0.5 EMULSION INTRAVENOUS at 02:09

## 2022-09-12 RX ADMIN — ASPIRIN 325 MG ORAL TABLET 325 MG: 325 PILL ORAL at 03:09

## 2022-09-12 RX ADMIN — IPRATROPIUM BROMIDE AND ALBUTEROL SULFATE 3 ML: 2.5; .5 SOLUTION RESPIRATORY (INHALATION) at 03:09

## 2022-09-12 RX ADMIN — SODIUM CHLORIDE, SODIUM GLUCONATE, SODIUM ACETATE, POTASSIUM CHLORIDE, MAGNESIUM CHLORIDE, SODIUM PHOSPHATE, DIBASIC, AND POTASSIUM PHOSPHATE: .53; .5; .37; .037; .03; .012; .00082 INJECTION, SOLUTION INTRAVENOUS at 07:09

## 2022-09-12 RX ADMIN — ROCURONIUM BROMIDE 30 MG: 10 INJECTION INTRAVENOUS at 11:09

## 2022-09-12 RX ADMIN — OXYCODONE HYDROCHLORIDE 10 MG: 10 TABLET ORAL at 03:09

## 2022-09-12 RX ADMIN — INSULIN HUMAN 1 UNITS/HR: 1 INJECTION, SOLUTION INTRAVENOUS at 02:09

## 2022-09-12 RX ADMIN — ROCURONIUM BROMIDE 50 MG: 10 INJECTION INTRAVENOUS at 08:09

## 2022-09-12 RX ADMIN — POLYETHYLENE GLYCOL 3350 17 G: 17 POWDER, FOR SOLUTION ORAL at 03:09

## 2022-09-12 RX ADMIN — IPRATROPIUM BROMIDE AND ALBUTEROL SULFATE 3 ML: 2.5; .5 SOLUTION RESPIRATORY (INHALATION) at 07:09

## 2022-09-12 RX ADMIN — Medication 20 MG: at 11:09

## 2022-09-12 RX ADMIN — MIDAZOLAM 1 MG: 1 INJECTION INTRAMUSCULAR; INTRAVENOUS at 07:09

## 2022-09-12 RX ADMIN — DOCUSATE SODIUM 100 MG: 100 CAPSULE ORAL at 08:09

## 2022-09-12 RX ADMIN — NEOSTIGMINE METHYLSULFATE 5 MG: 0.5 INJECTION INTRAVENOUS at 02:09

## 2022-09-12 RX ADMIN — FENTANYL CITRATE 50 MCG: 0.05 INJECTION, SOLUTION INTRAMUSCULAR; INTRAVENOUS at 11:09

## 2022-09-12 RX ADMIN — FENTANYL CITRATE 100 MCG: 0.05 INJECTION, SOLUTION INTRAMUSCULAR; INTRAVENOUS at 07:09

## 2022-09-12 RX ADMIN — CALCIUM CHLORIDE 1 G: 100 INJECTION, SOLUTION INTRAVENOUS at 12:09

## 2022-09-12 NOTE — ANESTHESIA PROCEDURE NOTES
GÓMEZ    Diagnosis: Thoracic aortic aneurysm without rupture [I71.2]  Patient location during procedure: OR  Exam type: Baseline    Staffing  Performed: anesthesiologist     Anesthesiologist: Cm Carter MD        Anesthesiologist Present  Yes      Setup & Induction  Patient preparation: bite block inserted  Probe Insertion: easy  Exam: completeDoppler Echo: 2D, continuous wave Doppler, color flow mapping and pulse wave Doppler.  Exam     Left Heart      Left Ventricle: cm, normal (men 4.2-5.9; women 3.8-5.2)    LVAD:no  Estimated Ejection Fraction: > 55% normal  Regional Wall Abnormalities: no RWMA            Right Heart  Right Ventricle: normal  Right Ventricle Function: normal    Intra Atrial Septum  PFO: no shunt by color flow doppler  no IAS aneurysm  no lipomatous hypertrophy  no Atrial Septal Defect (Asd)    Right Ventricle  Size: normal    Aortic Valve:  Morphology: bicuspid aortic valve    Regurgitation: mild to moderate aortic regurgitation      Mitral Valve:   Morphology:normal  Prolapse: none  Jet Description: mild and centrally-directed    Tricuspid Valve:  Morphology: normal  Regurgitation: none    Pulmonic Valve:  Morphology:normal    Great Vessels  Ascending Aorta Atherosclerosis: 1=nl-min dz  Aortic Arch Atherosclerosis: 1=nl-min dz  IABP: no  Descending Aorta Atherosclerosis: 1=nl-min dz  Aorta    Descending aorta IABP: no    Effusions none    SummaryFindings discussed with surgeon.    Other Findings   Pre bypass exam:  Normal biventricular systolic function  Bicuspid AV with fusion of the right and non coronary cusps  No AS, Moderate AI (low end of moderate)  Mild MR, no TR  Dilated aortic root and ascending aorta  No effusions    Post-bypass exam:  Interval replacement of the AV with a 25mm bi leaflet mechanical valve  Valve is well seated and both leaflets open appropriately  Small washing jets, no valvular regurgitation, gradients 13-16mmHg through the valve  Normal biventricular systolic  function  Interval replacement of the aortic root and ascending aorta with a valsalva graft  No effusions  No dissection  Unchanged mitral and tricuspid valvular functionFor congenital abnormalities.

## 2022-09-12 NOTE — ANESTHESIA PROCEDURE NOTES
Central Line    Diagnosis: Thoracic aortic aneurysm  Patient location during procedure: done in OR  Timeout: 9/12/2022 7:20 AM  Procedure end time: 9/12/2022 7:45 AM    Staffing  Authorizing Provider: Cm Carter MD  Performing Provider: Shameka Fernandez MD    Staffing  Performed: resident/CRNA   Anesthesiologist was present at the time of the procedure.  Preanesthetic Checklist  Completed: patient identified, IV checked, site marked, risks and benefits discussed, surgical consent, monitors and equipment checked, pre-op evaluation, timeout performed and anesthesia consent given  Indication   Indication: vascular access, med administration, hemodynamic monitoring     Anesthesia   general anesthesia    Central Line   Skin Prep: skin prepped with ChloraPrep, skin prep agent completely dried prior to procedure  Sterile Barriers Followed: Yes    All five maximal barriers used- gloves, gown, cap, mask, and large sterile sheet    hand hygiene performed prior to central venous catheter insertion  Location: right internal jugular.   Catheter type: triple lumen  Catheter Size: 12 Fr  Ultrasound: vascular probe with ultrasound   Vessel Caliber: large, patent, compressibility normal  Needle advanced into vessel with real time Ultrasound guidance.  Guidewire confirmed in vessel.  sterile gel and probe cover used in ultrasound-guided central venous catheter insertion   Manometry: Venous cannualation confirmed by visual estimation of blood vessel pressure using manometry.  Insertion Attempts: 1   Securement:line sutured, chlorhexidine patch, sterile dressing applied and blood return through all ports    Post-Procedure    Adverse Events:none      Guidewire Guidewire removed intact.

## 2022-09-12 NOTE — ANESTHESIA PROCEDURE NOTES
Arterial    Diagnosis: Thoracic aortic aneurysm    Patient location during procedure: done in OR  Procedure start time: 9/12/2022 7:10 AM  Timeout: 9/12/2022 7:08 AM  Procedure end time: 9/12/2022 7:14 AM    Staffing  Authorizing Provider: Cm Carter MD  Performing Provider: Shameka Fernandez MD    Anesthesiologist was present at the time of the procedure.    Preanesthetic Checklist  Completed: patient identified, IV checked, site marked, risks and benefits discussed, surgical consent, monitors and equipment checked, pre-op evaluation, timeout performed and anesthesia consent givenArterial  Skin Prep: chlorhexidine gluconate  Local Infiltration: lidocaine  Orientation: left  Location: brachial    Catheter Size: 20 G  Catheter placement by Ultrasound guidance. Heme positive aspiration all ports.   Vessel Caliber: medium, patent, compressibility normal  Needle advanced into vessel with real time Ultrasound guidance.Insertion Attempts: 1  Assessment  Dressing: sutured in place and taped and secured with tape and tegaderm  Patient: Tolerated well

## 2022-09-12 NOTE — HPI
Reason for Consult: Management of Hyperglycemia     Surgical Procedure and Date:   9/12/22  BENTALL PROCEDURE (N/A)      HPI:   Patient is a 47 y.o. male with a diagnosis of GERD, HLD, HTN, bicuspid AV with moderate AR, and 5.4 x 5.3cm thoracic aortic aneurysm. Patient now presents for planned hemiarch replacement, aortic root replacement, and mechanical valve. Endocrinology consulted for management of hyperglycemia.     Lab Results   Component Value Date    HGBA1C 5.4 09/08/2022

## 2022-09-12 NOTE — ANESTHESIA PROCEDURE NOTES
Intubation    Date/Time: 9/12/2022 7:14 AM  Performed by: Som Herrera MD  Authorized by: Cm Carter MD     Intubation:     Induction:  Intravenous    Intubated:  Postinduction    Mask Ventilation:  Easy mask    Attempts:  1    Attempted By:  Resident anesthesiologist    Method of Intubation:  Direct    Blade:  Uriel 3    Laryngeal View Grade: Grade I - full view of cords      Difficult Airway Encountered?: No      Complications:  None    Airway Device:  Oral endotracheal tube    Airway Device Size:  7.5    Style/Cuff Inflation:  Cuffed (inflated to minimal occlusive pressure)    Placement Verified By:  Capnometry    Complicating Factors:  None    Findings Post-Intubation:  Atraumatic/condition of teeth unchanged and BS equal bilateral

## 2022-09-12 NOTE — PROGRESS NOTES
Autotransfusion/Rapid Infusion Record:      09/12/2022  Autotransfusionist:  Talita Carvalho    Surgeon(s) and Role:     * Thomas Boudreaux MD - Primary     * Juan Layton MD - Fellow  Anesthesiologist:  Cm Carter MD    Past Medical History:   Diagnosis Date    Aortic valve, bicuspid 2020    echo    GERD (gastroesophageal reflux disease)     Hyperlipidemia     Hypertension     Male hypogonadism        Procedure(s) (LRB):  BENTALL PROCEDURE (N/A)     1:20 PM    Equipment:    Cell Saver     R.I.S.  : [employer] Model: CATSmart or CATSplus : Gisela   Model: [id number]     Serial number: [id number]   Serial number: 0   Disposable lot #: [id number]   Disposable lot #: 0     Were extra cardiotomies used for cell saver?  n   if yes, #:  0    Solutions:  Anticoagulant: ACD-A   Expiration date: 1/24 Volume used: 500   Wash solution: 0.9% NaCl   Expiration date: 5/25 Volume used: 7621     Cell saver checklist  Time completed:           [x]   Circuit assembled correctly     [x]   Cell saver powered and operational     [x]   Vacuum connected, functional, adjust to max -150mmHg     [x]   Anticoagulant drip rate adjusted     [x]   Transfer bag properly labeled with patient name, expiration time, volume,       anticoagulant, OR number, and initials     [x]   Cell saver disinfected after use (completed at end of case)       Cell Saver volumes:    Total volume processed:     3662 mL     Total volume pRBCs recovered     1556 mL     Volume pRBCs infused     1556 mL         RIS checklist   Time completed:  []   RIS circuit assembled correctly     []   RIS power and operational     []   RIS disinfected after use (completed at end of case)       RIS volumes:    Total volume infused:    (see anesthesia record for blood       product information)   0 mL       Additional comments:

## 2022-09-12 NOTE — CARE UPDATE
Nurses Note -- 4 Eyes      9/12/2022   4:52 PM      Skin assessed during: Admit      [x] No Pressure Injuries Present    []Prevention Measures Documented      [] Yes- Altered Skin Integrity Present or Discovered   [] LDA Added if Not in Epic (Describe Wound)   [] New Altered Skin Integrity was Present on Admit and Documented in LDA   [] Wound Image Taken    Wound Care Consulted? No    Attending Nurse:  Hailey Del Castillo RN     Second RN/Staff Member:  GORDON Phan

## 2022-09-12 NOTE — PLAN OF CARE
Report received from Anesthesia. Pt transported to SICU [id number] with portable telemetry Ambubag in use. Pt connected to ICU monitor Ventilator. Dr. Hussein and Dr. Landis called and made aware of patient arrival. New orders received and implemented. Pt assessed, immediate needs met. Family brought to bedside, updated on the patient's current condition and plan of care. Family also given ICU Welcome packet and educated on visiting hours. All questions answered, emotional support provided.

## 2022-09-12 NOTE — ASSESSMENT & PLAN NOTE
  Neuro/Psych:   -- Sedation: Propofol. Wean as tolerated with plans to extubate  -- Pain: PRN Oxy + Dilaudid  -- Scheduled Tylenol             Cards:   -- S/P Linda 9/12/22  -- HDS off pressor support  -- Ventricular pacing wires.   -- MAP > 60-80, Syst < 130  -- Cleviprex PRN  -- Aspirin 325mg tonight pending postoperative coags and chest tube output      Pulm:   -- Goal O2 sat > 90%  -- ABG PRN  -- Wean vent as tolerated  -- Plan to extubate tonight if stable  -- Mediastinal chest tubes x2 and pleural chest tube x1 to suction      Renal:  -- Keep monteiro for strict I/O  -- Trend BUN/Cr       FEN / GI:   -- Replace lytes as needed  -- Nutrition: NPO  -- GI ppx: famotidine  -- Bowel reg: miralax  -- 1500mL 5% Albumin in first 12 hours post-op      ID:   -- Tm: afebrile; WBC stable  -- Lizabeth-op ancef      Heme/Onc:   -- H/H stable   -- Daily CBC  -- 475 mL Cell saver given back  -- 2 units autologous blood given   -- Post-op coags pending  -- Aspirin 325mg QD      Endo:   -- BG goal 140-180  -- Insulin gtt      PPx:   Feeding: NPO  Analgesia/Sedation: Propofol / PRN Oxy + Dilaudid  Thromboembolic prevention: SCDs  HOB >30: Yes  Stress Ulcer ppx: famotidine  Glucose control: Critical care goal 140-180 g/dl, ISS     Lines/Drains/Airway: CVC RIJ, Monteiro, Chest tubes x 3, ventricular pacing wires, L radial A-line      Dispo/Code Status/Palliative:   -- SICU / Full Code.

## 2022-09-12 NOTE — SUBJECTIVE & OBJECTIVE
Follow-up For: Procedure(s) (LRB):  BENTALL PROCEDURE (N/A)    Post-Operative Day: Day of Surgery     Past Medical History:   Diagnosis Date    Aortic valve, bicuspid 2020    echo    GERD (gastroesophageal reflux disease)     Hyperlipidemia     Hypertension     Male hypogonadism        Past Surgical History:   Procedure Laterality Date    ELBOW SURGERY Right     LEFT HEART CATHETERIZATION N/A 8/24/2022    Procedure: Left heart cath;  Surgeon: Felix Monsalve MD;  Location: [facility] CATH LAB;  Service: Cardiology;  Laterality: N/A;    NASAL SEPTUM SURGERY         Review of patient's allergies indicates:   Allergen Reactions    Dexlansoprazole     Prevacid [lansoprazole]        Family History    None       Tobacco Use    Smoking status: Never    Smokeless tobacco: Never   Substance and Sexual Activity    Alcohol use: Yes     Comment: socially    Drug use: Never    Sexual activity: Yes     Partners: Female      Review of Systems   Unable to perform ROS: Intubated   Objective:     Vital Signs (Most Recent):  Temp: 98.2 °F (36.8 °C) (09/12/22 0615)  Pulse: 75 (09/12/22 1400)  Resp: 17 (09/12/22 1400)  BP: (!) 159/97 (09/12/22 0615)  SpO2: 100 % (09/12/22 1400)   Vital Signs (24h Range):  Temp:  [98.2 °F (36.8 °C)] 98.2 °F (36.8 °C)  Pulse:  [52-75] 75  Resp:  [16-28] 17  SpO2:  [98 %-100 %] 100 %  BP: (159)/(97) 159/97  Arterial Line BP: (111)/(54) 111/54        There is no height or weight on file to calculate BMI.      Intake/Output Summary (Last 24 hours) at 9/12/2022 1418  Last data filed at 9/12/2022 1400  Gross per 24 hour   Intake 5575 ml   Output 2090 ml   Net 3485 ml       Physical Exam  Vitals and nursing note reviewed.   Constitutional:       Comments: Intubated and sedated   Cardiovascular:      Rate and Rhythm: Normal rate and regular rhythm.      Pulses: Normal pulses.   Pulmonary:      Comments: Intubated  Abdominal:      General: Abdomen is flat.      Palpations: Abdomen is soft.   Skin:     General: Skin  is warm and dry.       Vents:  Vent Mode: A/C (09/12/22 1400)  Ventilator Initiated: Yes (09/12/22 1357)  Set Rate: 22 BPM (09/12/22 1400)  Vt Set: 460 mL (09/12/22 1400)  PEEP/CPAP: 5 cmH20 (09/12/22 1400)  Oxygen Concentration (%): 100 (09/12/22 1400)  Peak Airway Pressure: 20 cmH2O (09/12/22 1400)  Plateau Pressure: 0 cmH20 (09/12/22 1400)  Total Ve: 10.7 mL (09/12/22 1400)  Negative Inspiratory Force (cm H2O): 0 (09/12/22 1400)  F/VT Ratio<105 (RSBI): 1545.45 (09/12/22 1400)    Lines/Drains/Airways       Central Venous Catheter Line  Duration             Percutaneous Central Line Insertion/Assessment - Triple Lumen  09/12/22 0911 right internal jugular <1 day              Drain  Duration                  Urethral Catheter 09/12/22 0754 Non-latex;Straight-tip;Temperature probe 14 Fr. <1 day         Y Chest Tube 1 and 2 09/12/22 1249 1 Right Mediastinal 19 Fr. 2 Left Mediastinal 19 Fr. <1 day              Airway  Duration                  Airway - Non-Surgical 09/12/22 0714 <1 day              Arterial Line  Duration             Arterial Line 09/12/22 0710 Left Other (Comment) <1 day              Line  Duration                  Pacer Wires 09/12/22 1210 <1 day              Peripheral Intravenous Line  Duration                  Peripheral IV - Single Lumen 09/12/22 0620 18 G Left Antecubital <1 day                    Significant Labs:    CBC/Anemia Profile:  Recent Labs   Lab 09/12/22  1047 09/12/22  1117 09/12/22  1146   HCT 33* 33* 33*        Chemistries:  No results for input(s): NA, K, CL, CO2, BUN, CREATININE, CALCIUM, ALBUMIN, PROT, BILITOT, ALKPHOS, ALT, AST, GLUCOSE, MG, PHOS in the last 48 hours.    All pertinent labs within the past 24 hours have been reviewed.    Significant Imaging: I have reviewed all pertinent imaging results/findings within the past 24 hours.

## 2022-09-12 NOTE — CONSULTS
[facility] - [facility] Intensive Care  Endocrinology  Diabetes Consult Note    Consult Requested by: Thomas Boudreaux MD   Reason for admit: Aortic regurgitation due to bicuspid aortic valve    HISTORY OF PRESENT ILLNESS:  Reason for Consult: Management of Hyperglycemia     Surgical Procedure and Date:   9/12/22  BENTALL PROCEDURE (N/A)      HPI:   Patient is a 47 y.o. male with a diagnosis of GERD, HLD, HTN, bicuspid AV with moderate AR, and 5.4 x 5.3cm thoracic aortic aneurysm. Patient now presents for planned hemiarch replacement, aortic root replacement, and mechanical valve. Endocrinology consulted for management of hyperglycemia.     Lab Results   Component Value Date    HGBA1C 5.4 09/08/2022           Interval HPI:   Overnight events: Admitted to SICU. Remains intubated and sedated. BG within goal ranges on IV intensive insulin protocol. Diet NPO Except for: Sips with Medication    Eating:   NPO  Nausea: No  Hypoglycemia and intervention: No  Fever: No  TPN and/or TF: No  If yes, type of TF/TPN and rate: n/a    PMH, PSH, FH, SH reviewed     ROS:  Unable to obtain due to: Sedation,Intubation,Altered mental status,Critical illness,Reviewed ROS from note dated 9/12/22 by  Juan Layton MD    Review of Systems    Current Medications and/or Treatments Impacting Glycemic Control  Immunotherapy:    Immunosuppressants       None          Steroids:   Hormones (From admission, onward)      None          Pressors:    Autonomic Drugs (From admission, onward)      Start     Stop Route Frequency Ordered    09/12/22 1400  EPINEPHrine 5 mg in dextrose 5% 250 mL infusion (premix)        Question Answer Comment   Begin at (in mcg/kg/min): 0.02    Titrate by: (in mcg/kg/min) 0.02    Titrate interval: (in minutes) 5    Titrate to maintain: (SBP or MAP or Cardiac Index) MAP    Greater than: (in mmHg) 65    Cardiac index greater than: (in L/min) 2.2    Maximum dose: (in mcg/kg/min) 2        -- IV Continuous 09/12/22 1837           Hyperglycemia/Diabetes Medications:   Antihyperglycemics (From admission, onward)      Start     Stop Route Frequency Ordered    09/12/22 1400  insulin regular in 0.9 % NaCl 100 unit/100 mL (1 unit/mL) infusion        Question Answer Comment   Insulin rate changes (DO NOT MODIFY ANSWER) \\[email or web address]\epic\Images\Pharmacy\InsulinInfusions\CTS INSULIN [id number].pdf    Enter initial dose (Units/hr): 1        -- IV Continuous 09/12/22 1345             PHYSICAL EXAMINATION:  Vitals:    09/12/22 1500   BP:    Pulse: (!) 58   Resp: 11   Temp:      Body mass index is 29.47 kg/m².    Physical Exam  Constitutional: Well developed, NAD.  ENT: External ears no masses with nose patent  Neck: Supple; trachea midline  Cardiovascular: Normal heart sounds, no LE edema. DP +2 bilaterally.  Lungs: Normal effort; lungs anterior bilaterally clear to auscultation.  Intubated on a ventilator.  Abdomen: Soft, no masses, no hernias.  Hypoactive BS noted.  MS: No clubbing or cyanosis of nails noted; unable to assess gait.  Skin: No rashes, lesions, or ulcers; no nodules.  Mid-sternal incision with telfa island dressing, CDI.  CT x 2.  LLE wrapped with ACE wrap.  Psychiatric: BERNIE  Neurological: BERNIE  Foot: Nails in good condition, no amputations noted        Labs Reviewed and Include   Recent Labs   Lab 09/12/22  1401   *   CALCIUM 7.2*   ALBUMIN 2.5*   PROT 4.5*      K 3.6   CO2 21*      BUN 10   CREATININE 0.8   ALKPHOS 34*   ALT 15   AST 43*   BILITOT 0.7     Lab Results   Component Value Date    WBC 21.21 (H) 09/12/2022    HGB 11.3 (L) 09/12/2022    HCT 35.1 (L) 09/12/2022    MCV 76 (L) 09/12/2022     09/12/2022     No results for input(s): TSH, FREET4 in the last 168 hours.  Lab Results   Component Value Date    HGBA1C 5.4 09/08/2022       Nutritional status:   Body mass index is 29.47 kg/m².  Lab Results   Component Value Date    ALBUMIN 2.5 (L) 09/12/2022    ALBUMIN 4.1 09/08/2022    ALBUMIN 4.7 06/10/2022      No results found for: PREALBUMIN    Estimated Creatinine Clearance: 158.3 mL/min (based on SCr of 0.8 mg/dL).    Accu-Checks  Recent Labs     09/12/22  1405 09/12/22  1503   POCTGLUCOSE 166* 153*        ASSESSMENT and PLAN    * Aortic regurgitation due to bicuspid aortic valve  Managed per primary team  Optimize BG control        Stress hyperglycemia  BG goal 110-140 (CTS protocol)     Continue IV insulin infusion protocol  Requires intensive BG monitoring while on protocol     ** Please call Endocrine for any BG related issues **    Discharge planning: TBD        Mixed hyperlipidemia  May increase insulin resistance.             Plan discussed with RN at bedside.     Dora Johnson NP  Endocrinology  [facility] - [facility] Int[facility]

## 2022-09-12 NOTE — RESPIRATORY THERAPY
Pt received from OR at 1357 intubated with a 7.5 ET tube measuring 24 cm at the lips. Will continue to monitor.

## 2022-09-12 NOTE — ASSESSMENT & PLAN NOTE
BG goal 110-140 (CTS protocol)     Continue IV insulin infusion protocol  Requires intensive BG monitoring while on protocol     ** Please call Endocrine for any BG related issues **    Discharge planning: PANKAJ

## 2022-09-12 NOTE — TRANSFER OF CARE
Anesthesia Transfer of Care Note    Patient: Alden Ledbetter    Procedure(s) Performed: Procedure(s) (LRB):  BENTALL PROCEDURE (N/A)    Patient location: ICU    Anesthesia Type: general    Transport from OR: Transported from OR intubated on 100% O2 by AMBU with assisted ventilation    Post pain: adequate analgesia    Post assessment: no apparent anesthetic complications    Post vital signs: stable    Level of consciousness: sedated    Nausea/Vomiting: no nausea/vomiting    Complications: none    Transfer of care protocol was followed      Last vitals:   Visit Vitals  BP (!) 159/97 (BP Location: Left arm, Patient Position: Lying)   Pulse 75   Temp 36.8 °C (98.2 °F) (Oral)   Resp 17   SpO2 100%

## 2022-09-12 NOTE — SUBJECTIVE & OBJECTIVE
Interval HPI:   Overnight events: Admitted to SICU. Remains intubated and sedated. BG within goal ranges on IV intensive insulin protocol. Diet NPO Except for: Sips with Medication    Eating:   NPO  Nausea: No  Hypoglycemia and intervention: No  Fever: No  TPN and/or TF: No  If yes, type of TF/TPN and rate: n/a    PMH, PSH, FH, SH reviewed     ROS:  Unable to obtain due to: Sedation,Intubation,Altered mental status,Critical illness,Reviewed ROS from note dated 9/12/22 by  Juan Layton MD    Review of Systems    Current Medications and/or Treatments Impacting Glycemic Control  Immunotherapy:    Immunosuppressants       None          Steroids:   Hormones (From admission, onward)      None          Pressors:    Autonomic Drugs (From admission, onward)      Start     Stop Route Frequency Ordered    09/12/22 1400  EPINEPHrine 5 mg in dextrose 5% 250 mL infusion (premix)        Question Answer Comment   Begin at (in mcg/kg/min): 0.02    Titrate by: (in mcg/kg/min) 0.02    Titrate interval: (in minutes) 5    Titrate to maintain: (SBP or MAP or Cardiac Index) MAP    Greater than: (in mmHg) 65    Cardiac index greater than: (in L/min) 2.2    Maximum dose: (in mcg/kg/min) 2        -- IV Continuous 09/12/22 1345          Hyperglycemia/Diabetes Medications:   Antihyperglycemics (From admission, onward)      Start     Stop Route Frequency Ordered    09/12/22 1400  insulin regular in 0.9 % NaCl 100 unit/100 mL (1 unit/mL) infusion        Question Answer Comment   Insulin rate changes (DO NOT MODIFY ANSWER) \\[email or web address]\Images\Pharmacy\InsulinInfusions\CTS INSULIN [id number].pdf    Enter initial dose (Units/hr): 1        -- IV Continuous 09/12/22 1345             PHYSICAL EXAMINATION:  Vitals:    09/12/22 1500   BP:    Pulse: (!) 58   Resp: 11   Temp:      Body mass index is 29.47 kg/m².    Physical Exam  Constitutional: Well developed, NAD.  ENT: External ears no masses with nose patent  Neck: Supple; trachea  midline  Cardiovascular: Normal heart sounds, no LE edema. DP +2 bilaterally.  Lungs: Normal effort; lungs anterior bilaterally clear to auscultation.  Intubated on a ventilator.  Abdomen: Soft, no masses, no hernias.  Hypoactive BS noted.  MS: No clubbing or cyanosis of nails noted; unable to assess gait.  Skin: No rashes, lesions, or ulcers; no nodules.  Mid-sternal incision with telfa island dressing, CDI.  CT x 2.  LLE wrapped with ACE wrap.  Psychiatric: BERNIE  Neurological: BERNIE  Foot: Nails in good condition, no amputations noted

## 2022-09-12 NOTE — H&P
[facility] - Surgical Intensive Care  Critical Care - Surgery  History & Physical    Patient Name: Alden Ledbetter  MRN: [id number]  Admission Date: 9/12/2022  Code Status: Full Code  Attending Physician: Thomas Boudreaux MD   Primary Care Provider: Warner Macedo MD   Principal Problem: Aortic regurgitation due to bicuspid aortic valve    Subjective:     HPI:  Alden Ledbetter is a 47 y.o. male with a PMHx of HTN, HLD, and GERD with a bicuspid aortic valve and moderate aortic regurgitation in addition to an enlarging thoracic aortic aneurysm (5.4 x 5.3cm on last CT) with a dilated aortic root. He has resting sinus bradycardia (HR 54 on pre-op EKG) and is otherwise active without symptoms.  He presents to the SICU s/p aortic valve replacement (mechanical valve) and ascending aorta replacement. On admission he is intubated, sedated, and in stable condition. Pressor requirements upon admission are 0.02 mcg/kg/min of epinephrine. Blood pressures are stable with MAPs maintaining greater than 65. Central access includes a 12Fr triple-lumen CVC, arterial access includes a left brachial arterial line, and peripheral access includes an 18G PIV in the left AC. He also has 2 mediastinal and 1 pleural chest tubes with appropriate output.   Intraoperatively, he received 4L of crystalloid, 0 of albumin, 2u autologous blood, and 475cc of cell saver. Urine output intraoperatively was recorded as 3000 mL. His pre-operative echocardiogram was notable for normal biventricular function, bicuspid aortic valve with mild aortic regurgitation, and moderate-severe dilation of the ascending aorta. Post-operative echo showed normal Biventricular function.   Immediate post-operative plans include hemodynamic stabilization and weaning of cardiac and respiratory support. Plans to wean vasopressors as tolerated, wean vent support with goals of extubation, and closely monitor fluid status with strict I's+O's and continued fluid  resuscitation as needed.        Hospital/ICU Course:  No notes on file    Follow-up For: Procedure(s) (LRB):  BENTALL PROCEDURE (N/A)    Post-Operative Day: Day of Surgery     Past Medical History:   Diagnosis Date    Aortic valve, bicuspid 2020    echo    GERD (gastroesophageal reflux disease)     Hyperlipidemia     Hypertension     Male hypogonadism        Past Surgical History:   Procedure Laterality Date    ELBOW SURGERY Right     LEFT HEART CATHETERIZATION N/A 8/24/2022    Procedure: Left heart cath;  Surgeon: Felix Monsalve MD;  Location: [facility] CATH LAB;  Service: Cardiology;  Laterality: N/A;    NASAL SEPTUM SURGERY         Review of patient's allergies indicates:   Allergen Reactions    Dexlansoprazole     Prevacid [lansoprazole]        Family History    None       Tobacco Use    Smoking status: Never    Smokeless tobacco: Never   Substance and Sexual Activity    Alcohol use: Yes     Comment: socially    Drug use: Never    Sexual activity: Yes     Partners: Female      Review of Systems   Unable to perform ROS: Intubated   Objective:     Vital Signs (Most Recent):  Temp: 98.2 °F (36.8 °C) (09/12/22 0615)  Pulse: 75 (09/12/22 1400)  Resp: 17 (09/12/22 1400)  BP: (!) 159/97 (09/12/22 0615)  SpO2: 100 % (09/12/22 1400)   Vital Signs (24h Range):  Temp:  [98.2 °F (36.8 °C)] 98.2 °F (36.8 °C)  Pulse:  [52-75] 75  Resp:  [16-28] 17  SpO2:  [98 %-100 %] 100 %  BP: (159)/(97) 159/97  Arterial Line BP: (111)/(54) 111/54        There is no height or weight on file to calculate BMI.      Intake/Output Summary (Last 24 hours) at 9/12/2022 1418  Last data filed at 9/12/2022 1400  Gross per 24 hour   Intake 5575 ml   Output 2090 ml   Net 3485 ml       Physical Exam  Vitals and nursing note reviewed.   Constitutional:       Comments: Intubated and sedated   Cardiovascular:      Rate and Rhythm: Normal rate and regular rhythm.      Pulses: Normal pulses.   Pulmonary:      Comments: Intubated  Abdominal:       General: Abdomen is flat.      Palpations: Abdomen is soft.   Skin:     General: Skin is warm and dry.       Vents:  Vent Mode: A/C (09/12/22 1400)  Ventilator Initiated: Yes (09/12/22 1357)  Set Rate: 22 BPM (09/12/22 1400)  Vt Set: 460 mL (09/12/22 1400)  PEEP/CPAP: 5 cmH20 (09/12/22 1400)  Oxygen Concentration (%): 100 (09/12/22 1400)  Peak Airway Pressure: 20 cmH2O (09/12/22 1400)  Plateau Pressure: 0 cmH20 (09/12/22 1400)  Total Ve: 10.7 mL (09/12/22 1400)  Negative Inspiratory Force (cm H2O): 0 (09/12/22 1400)  F/VT Ratio<105 (RSBI): 1545.45 (09/12/22 1400)    Lines/Drains/Airways       Central Venous Catheter Line  Duration             Percutaneous Central Line Insertion/Assessment - Triple Lumen  09/12/22 0911 right internal jugular <1 day              Drain  Duration                  Urethral Catheter 09/12/22 0754 Non-latex;Straight-tip;Temperature probe 14 Fr. <1 day         Y Chest Tube 1 and 2 09/12/22 1249 1 Right Mediastinal 19 Fr. 2 Left Mediastinal 19 Fr. <1 day              Airway  Duration                  Airway - Non-Surgical 09/12/22 0714 <1 day              Arterial Line  Duration             Arterial Line 09/12/22 0710 Left Other (Comment) <1 day              Line  Duration                  Pacer Wires 09/12/22 1210 <1 day              Peripheral Intravenous Line  Duration                  Peripheral IV - Single Lumen 09/12/22 0620 18 G Left Antecubital <1 day                    Significant Labs:    CBC/Anemia Profile:  Recent Labs   Lab 09/12/22  1047 09/12/22  1117 09/12/22  1146   HCT 33* 33* 33*        Chemistries:  No results for input(s): NA, K, CL, CO2, BUN, CREATININE, CALCIUM, ALBUMIN, PROT, BILITOT, ALKPHOS, ALT, AST, GLUCOSE, MG, PHOS in the last 48 hours.    All pertinent labs within the past 24 hours have been reviewed.    Significant Imaging: I have reviewed all pertinent imaging results/findings within the past 24 hours.    Assessment/Plan:     * Aortic regurgitation due to  bicuspid aortic valve    Neuro/Psych:   -- Sedation: Propofol. Wean as tolerated with plans to extubate  -- Pain: PRN Oxy + Dilaudid  -- Scheduled Tylenol             Cards:   -- S/P Linda 9/12/22  -- HDS off pressor support  -- Ventricular pacing wires.   -- MAP > 60-80, Syst < 130  -- Cleviprex PRN  -- Aspirin 325mg tonight pending postoperative coags and chest tube output      Pulm:   -- Goal O2 sat > 90%  -- ABG PRN  -- Wean vent as tolerated  -- Plan to extubate tonight if stable  -- Mediastinal chest tubes x2 and pleural chest tube x1 to suction      Renal:  -- Keep monteiro for strict I/O  -- Trend BUN/Cr       FEN / GI:   -- Replace lytes as needed  -- Nutrition: NPO  -- GI ppx: famotidine  -- Bowel reg: miralax  -- 1500mL 5% Albumin in first 12 hours post-op      ID:   -- Tm: afebrile; WBC stable  -- Lizabeth-op ancef      Heme/Onc:   -- H/H stable   -- Daily CBC  -- 475 mL Cell saver given back  -- 2 units autologous blood given   -- Post-op coags pending  -- Aspirin 325mg QD      Endo:   -- BG goal 140-180  -- Insulin gtt      PPx:   Feeding: NPO  Analgesia/Sedation: Propofol / PRN Oxy + Dilaudid  Thromboembolic prevention: SCDs  HOB >30: Yes  Stress Ulcer ppx: famotidine  Glucose control: Critical care goal 140-180 g/dl, ISS     Lines/Drains/Airway: CVC RIJ, Monteiro, Chest tubes x 3, ventricular pacing wires, L radial A-line      Dispo/Code Status/Palliative:   -- SICU / Full Code.              Som Hussein MD  Critical Care - Surgery  [facility] - [facility]

## 2022-09-12 NOTE — HPI
Alden Ledbetter is a 47 y.o. male with a PMHx of HTN, HLD, and GERD with a bicuspid aortic valve and moderate aortic regurgitation in addition to an enlarging thoracic aortic aneurysm (5.4 x 5.3cm on last CT) with a dilated aortic root. He has resting sinus bradycardia (HR 54 on pre-op EKG) and is otherwise active without symptoms.  He presents to the SICU s/p aortic valve replacement (25mm bileaflet mechanical valve) and ascending aorta replacement. On admission he is intubated, sedated, and in stable condition. Pressor requirements upon admission are minimal with only 0.02mcg/kg/min of epinephrine. Blood pressures are stable with MAPs maintaining greater than 65. Central access includes a 12Fr triple-lumen CVC, arterial access includes a left brachial arterial line, and peripheral access includes an 18G PIV in the left AC. He also has 2 mediastinal and 1 pleural chest tubes with appropriate output.   Intraoperatively, he received 4L of crystalloid, 2 units of autologous blood, and 475cc of cell saver. Urine output intraoperatively was recorded as approximately 1925cc. His pre-operative echocardiogram was notable for normal biventricular function, bicuspid aortic valve with low-moderate aortic regurgitation, and moderate-severe dilation of the ascending aorta. Post-operative echo showed normal biventricular function, no valvular regurgitation with gradients of 13-16mmHg throughout the aortic valve, and unchanged mitral/tricuspid valves.   Immediate post-operative plans include hemodynamic stabilization and weaning of cardiac and respiratory support. Plans to wean vasopressors as tolerated, wean vent support with goals of extubation, and closely monitor fluid status with strict I's+O's and continued fluid resuscitation as needed.

## 2022-09-12 NOTE — ANESTHESIA POSTPROCEDURE EVALUATION
Anesthesia Post Evaluation    Patient: Alden Ledbetter    Procedure(s) Performed: Procedure(s) (LRB):  BENTALL PROCEDURE (N/A)    Final Anesthesia Type: general      Patient location during evaluation: ICU  Patient participation: No - Unable to Participate, Sedation  Level of consciousness: sedated  Post-procedure vital signs: reviewed and stable  Pain management: adequate  Airway patency: patent    PONV status at discharge: No PONV  Anesthetic complications: no      Cardiovascular status: hemodynamically stable  Respiratory status: ETT, intubated and ventilator  Hydration status: euvolemic  Follow-up not needed.          Vitals Value Taken Time   /57 09/12/22 1503   Temp  09/12/22 1503   Pulse 58 09/12/22 1503   Resp 10 09/12/22 1503   SpO2 100 % 09/12/22 1503   Vitals shown include unvalidated device data.      No case tracking events are documented in the log.      Pain/Al Score: Pain Rating Prior to Med Admin: 9 (9/12/2022  2:46 PM)

## 2022-09-13 LAB
ALBUMIN SERPL BCP-MCNC: 3.2 G/DL (ref 3.5–5.2)
ALP SERPL-CCNC: 34 U/L (ref 55–135)
ALT SERPL W/O P-5'-P-CCNC: 19 U/L (ref 10–44)
ANION GAP SERPL CALC-SCNC: 5 MMOL/L (ref 8–16)
ANISOCYTOSIS BLD QL SMEAR: SLIGHT
APTT BLDCRRT: 25.4 SEC (ref 21–32)
APTT BLDCRRT: 25.4 SEC (ref 21–32)
APTT BLDCRRT: 30.4 SEC (ref 21–32)
AST SERPL-CCNC: 61 U/L (ref 10–40)
BASOPHILS # BLD AUTO: 0.04 K/UL (ref 0–0.2)
BASOPHILS # BLD AUTO: 0.05 K/UL (ref 0–0.2)
BASOPHILS # BLD AUTO: 0.08 K/UL (ref 0–0.2)
BASOPHILS NFR BLD: 0.2 % (ref 0–1.9)
BASOPHILS NFR BLD: 0.3 % (ref 0–1.9)
BASOPHILS NFR BLD: 0.4 % (ref 0–1.9)
BILIRUB SERPL-MCNC: 0.9 MG/DL (ref 0.1–1)
BLD PROD TYP BPU: NORMAL
BLOOD UNIT EXPIRATION DATE: NORMAL
BLOOD UNIT TYPE CODE: 5100
BLOOD UNIT TYPE: NORMAL
BUN SERPL-MCNC: 10 MG/DL (ref 6–20)
CALCIUM SERPL-MCNC: 7.4 MG/DL (ref 8.7–10.5)
CHLORIDE SERPL-SCNC: 104 MMOL/L (ref 95–110)
CO2 SERPL-SCNC: 23 MMOL/L (ref 23–29)
CODING SYSTEM: NORMAL
CREAT SERPL-MCNC: 0.7 MG/DL (ref 0.5–1.4)
DIFFERENTIAL METHOD: ABNORMAL
DISPENSE STATUS: NORMAL
EOSINOPHIL # BLD AUTO: 0 K/UL (ref 0–0.5)
EOSINOPHIL NFR BLD: 0 % (ref 0–8)
EOSINOPHIL NFR BLD: 0.1 % (ref 0–8)
EOSINOPHIL NFR BLD: 0.1 % (ref 0–8)
ERYTHROCYTE [DISTWIDTH] IN BLOOD BY AUTOMATED COUNT: 18.6 % (ref 11.5–14.5)
ERYTHROCYTE [DISTWIDTH] IN BLOOD BY AUTOMATED COUNT: 18.6 % (ref 11.5–14.5)
ERYTHROCYTE [DISTWIDTH] IN BLOOD BY AUTOMATED COUNT: 18.7 % (ref 11.5–14.5)
ERYTHROCYTE [DISTWIDTH] IN BLOOD BY AUTOMATED COUNT: 18.7 % (ref 11.5–14.5)
ERYTHROCYTE [DISTWIDTH] IN BLOOD BY AUTOMATED COUNT: 18.9 % (ref 11.5–14.5)
EST. GFR  (NO RACE VARIABLE): >60 ML/MIN/1.73 M^2
GLUCOSE SERPL-MCNC: 129 MG/DL (ref 70–110)
GLUCOSE SERPL-MCNC: 189 MG/DL (ref 70–110)
GLUCOSE SERPL-MCNC: 96 MG/DL (ref 70–110)
HCO3 UR-SCNC: 22.8 MMOL/L (ref 24–28)
HCO3 UR-SCNC: 24.7 MMOL/L (ref 24–28)
HCT VFR BLD AUTO: 24.3 % (ref 40–54)
HCT VFR BLD AUTO: 27.2 % (ref 40–54)
HCT VFR BLD AUTO: 31.4 % (ref 40–54)
HCT VFR BLD AUTO: 33.3 % (ref 40–54)
HCT VFR BLD AUTO: 33.3 % (ref 40–54)
HCT VFR BLD CALC: 27 %PCV (ref 36–54)
HCT VFR BLD CALC: 34 %PCV (ref 36–54)
HGB BLD-MCNC: 10.6 G/DL (ref 14–18)
HGB BLD-MCNC: 11.1 G/DL (ref 14–18)
HGB BLD-MCNC: 11.1 G/DL (ref 14–18)
HGB BLD-MCNC: 8.1 G/DL (ref 14–18)
HGB BLD-MCNC: 9.2 G/DL (ref 14–18)
HYPOCHROMIA BLD QL SMEAR: ABNORMAL
IMM GRANULOCYTES # BLD AUTO: 0.11 K/UL (ref 0–0.04)
IMM GRANULOCYTES # BLD AUTO: 0.12 K/UL (ref 0–0.04)
IMM GRANULOCYTES # BLD AUTO: 0.13 K/UL (ref 0–0.04)
IMM GRANULOCYTES # BLD AUTO: 0.13 K/UL (ref 0–0.04)
IMM GRANULOCYTES # BLD AUTO: 0.16 K/UL (ref 0–0.04)
IMM GRANULOCYTES NFR BLD AUTO: 0.6 % (ref 0–0.5)
IMM GRANULOCYTES NFR BLD AUTO: 0.7 % (ref 0–0.5)
IMM GRANULOCYTES NFR BLD AUTO: 0.8 % (ref 0–0.5)
INR PPP: 1.2 (ref 0.8–1.2)
INR PPP: 1.2 (ref 0.8–1.2)
INR PPP: 1.3 (ref 0.8–1.2)
LYMPHOCYTES # BLD AUTO: 1.3 K/UL (ref 1–4.8)
LYMPHOCYTES # BLD AUTO: 1.3 K/UL (ref 1–4.8)
LYMPHOCYTES # BLD AUTO: 1.4 K/UL (ref 1–4.8)
LYMPHOCYTES # BLD AUTO: 1.5 K/UL (ref 1–4.8)
LYMPHOCYTES # BLD AUTO: 1.5 K/UL (ref 1–4.8)
LYMPHOCYTES NFR BLD: 7.5 % (ref 18–48)
LYMPHOCYTES NFR BLD: 7.6 % (ref 18–48)
LYMPHOCYTES NFR BLD: 7.6 % (ref 18–48)
LYMPHOCYTES NFR BLD: 8.2 % (ref 18–48)
LYMPHOCYTES NFR BLD: 8.7 % (ref 18–48)
MAGNESIUM SERPL-MCNC: 1.8 MG/DL (ref 1.6–2.6)
MAGNESIUM SERPL-MCNC: 1.8 MG/DL (ref 1.6–2.6)
MCH RBC QN AUTO: 24.3 PG (ref 27–31)
MCH RBC QN AUTO: 24.3 PG (ref 27–31)
MCH RBC QN AUTO: 24.5 PG (ref 27–31)
MCH RBC QN AUTO: 24.7 PG (ref 27–31)
MCH RBC QN AUTO: 25 PG (ref 27–31)
MCHC RBC AUTO-ENTMCNC: 33.3 G/DL (ref 32–36)
MCHC RBC AUTO-ENTMCNC: 33.8 G/DL (ref 32–36)
MCHC RBC AUTO-ENTMCNC: 33.8 G/DL (ref 32–36)
MCV RBC AUTO: 73 FL (ref 82–98)
MCV RBC AUTO: 74 FL (ref 82–98)
MCV RBC AUTO: 74 FL (ref 82–98)
MONOCYTES # BLD AUTO: 1.9 K/UL (ref 0.3–1)
MONOCYTES # BLD AUTO: 1.9 K/UL (ref 0.3–1)
MONOCYTES # BLD AUTO: 2 K/UL (ref 0.3–1)
MONOCYTES # BLD AUTO: 2.1 K/UL (ref 0.3–1)
MONOCYTES # BLD AUTO: 2.4 K/UL (ref 0.3–1)
MONOCYTES NFR BLD: 11.1 % (ref 4–15)
MONOCYTES NFR BLD: 11.1 % (ref 4–15)
MONOCYTES NFR BLD: 11.5 % (ref 4–15)
MONOCYTES NFR BLD: 11.9 % (ref 4–15)
MONOCYTES NFR BLD: 11.9 % (ref 4–15)
NEUTROPHILS # BLD AUTO: 12.9 K/UL (ref 1.8–7.7)
NEUTROPHILS # BLD AUTO: 13.4 K/UL (ref 1.8–7.7)
NEUTROPHILS # BLD AUTO: 13.4 K/UL (ref 1.8–7.7)
NEUTROPHILS # BLD AUTO: 13.7 K/UL (ref 1.8–7.7)
NEUTROPHILS # BLD AUTO: 16.5 K/UL (ref 1.8–7.7)
NEUTROPHILS NFR BLD: 78.4 % (ref 38–73)
NEUTROPHILS NFR BLD: 78.9 % (ref 38–73)
NEUTROPHILS NFR BLD: 79.8 % (ref 38–73)
NEUTROPHILS NFR BLD: 80.3 % (ref 38–73)
NEUTROPHILS NFR BLD: 80.3 % (ref 38–73)
NRBC BLD-RTO: 0 /100 WBC
NUM UNITS TRANS FFP: NORMAL
OVALOCYTES BLD QL SMEAR: ABNORMAL
PCO2 BLDA: 35.7 MMHG (ref 35–45)
PCO2 BLDA: 45.3 MMHG (ref 35–45)
PH SMN: 7.34 [PH] (ref 7.35–7.45)
PH SMN: 7.41 [PH] (ref 7.35–7.45)
PHOSPHATE SERPL-MCNC: 2.6 MG/DL (ref 2.7–4.5)
PHOSPHATE SERPL-MCNC: 2.6 MG/DL (ref 2.7–4.5)
PLATELET # BLD AUTO: 165 K/UL (ref 150–450)
PLATELET # BLD AUTO: 178 K/UL (ref 150–450)
PLATELET # BLD AUTO: 178 K/UL (ref 150–450)
PLATELET # BLD AUTO: 182 K/UL (ref 150–450)
PLATELET # BLD AUTO: 188 K/UL (ref 150–450)
PMV BLD AUTO: 9.3 FL (ref 9.2–12.9)
PMV BLD AUTO: 9.3 FL (ref 9.2–12.9)
PMV BLD AUTO: 9.5 FL (ref 9.2–12.9)
PMV BLD AUTO: 9.6 FL (ref 9.2–12.9)
PMV BLD AUTO: 9.6 FL (ref 9.2–12.9)
PO2 BLDA: 278 MMHG (ref 80–100)
PO2 BLDA: 357 MMHG (ref 80–100)
POC BE: -1 MMOL/L
POC BE: -2 MMOL/L
POC IONIZED CALCIUM: 0.8 MMOL/L (ref 1.06–1.42)
POC IONIZED CALCIUM: 1.11 MMOL/L (ref 1.06–1.42)
POC SATURATED O2: 100 % (ref 95–100)
POC SATURATED O2: 100 % (ref 95–100)
POC TCO2: 24 MMOL/L (ref 23–27)
POC TCO2: 26 MMOL/L (ref 23–27)
POCT GLUCOSE: 104 MG/DL (ref 70–110)
POCT GLUCOSE: 106 MG/DL (ref 70–110)
POCT GLUCOSE: 107 MG/DL (ref 70–110)
POCT GLUCOSE: 108 MG/DL (ref 70–110)
POCT GLUCOSE: 108 MG/DL (ref 70–110)
POCT GLUCOSE: 110 MG/DL (ref 70–110)
POCT GLUCOSE: 114 MG/DL (ref 70–110)
POCT GLUCOSE: 116 MG/DL (ref 70–110)
POCT GLUCOSE: 121 MG/DL (ref 70–110)
POCT GLUCOSE: 124 MG/DL (ref 70–110)
POCT GLUCOSE: 142 MG/DL (ref 70–110)
POCT GLUCOSE: 157 MG/DL (ref 70–110)
POCT GLUCOSE: 226 MG/DL (ref 70–110)
POIKILOCYTOSIS BLD QL SMEAR: SLIGHT
POLYCHROMASIA BLD QL SMEAR: ABNORMAL
POTASSIUM BLD-SCNC: 3.6 MMOL/L (ref 3.5–5.1)
POTASSIUM BLD-SCNC: 3.7 MMOL/L (ref 3.5–5.1)
POTASSIUM SERPL-SCNC: 4 MMOL/L (ref 3.5–5.1)
POTASSIUM SERPL-SCNC: 4.2 MMOL/L (ref 3.5–5.1)
POTASSIUM SERPL-SCNC: 4.7 MMOL/L (ref 3.5–5.1)
PROT SERPL-MCNC: 5.1 G/DL (ref 6–8.4)
PROTHROMBIN TIME: 12.3 SEC (ref 9–12.5)
PROTHROMBIN TIME: 12.3 SEC (ref 9–12.5)
PROTHROMBIN TIME: 13.1 SEC (ref 9–12.5)
RBC # BLD AUTO: 3.3 M/UL (ref 4.6–6.2)
RBC # BLD AUTO: 3.68 M/UL (ref 4.6–6.2)
RBC # BLD AUTO: 4.29 M/UL (ref 4.6–6.2)
RBC # BLD AUTO: 4.56 M/UL (ref 4.6–6.2)
RBC # BLD AUTO: 4.56 M/UL (ref 4.6–6.2)
SAMPLE: ABNORMAL
SAMPLE: ABNORMAL
SODIUM BLD-SCNC: 140 MMOL/L (ref 136–145)
SODIUM BLD-SCNC: 140 MMOL/L (ref 136–145)
SODIUM SERPL-SCNC: 132 MMOL/L (ref 136–145)
SPHEROCYTES BLD QL SMEAR: ABNORMAL
WBC # BLD AUTO: 16.41 K/UL (ref 3.9–12.7)
WBC # BLD AUTO: 16.65 K/UL (ref 3.9–12.7)
WBC # BLD AUTO: 16.65 K/UL (ref 3.9–12.7)
WBC # BLD AUTO: 17.49 K/UL (ref 3.9–12.7)
WBC # BLD AUTO: 20.65 K/UL (ref 3.9–12.7)

## 2022-09-13 PROCEDURE — 85610 PROTHROMBIN TIME: CPT | Performed by: PHYSICIAN ASSISTANT

## 2022-09-13 PROCEDURE — 63600175 PHARM REV CODE 636 W HCPCS: Performed by: NURSE PRACTITIONER

## 2022-09-13 PROCEDURE — 20000000 HC ICU ROOM

## 2022-09-13 PROCEDURE — 85730 THROMBOPLASTIN TIME PARTIAL: CPT | Performed by: PHYSICIAN ASSISTANT

## 2022-09-13 PROCEDURE — 84132 ASSAY OF SERUM POTASSIUM: CPT | Performed by: PHYSICIAN ASSISTANT

## 2022-09-13 PROCEDURE — 99232 SBSQ HOSP IP/OBS MODERATE 35: CPT | Mod: ,,, | Performed by: NURSE PRACTITIONER

## 2022-09-13 PROCEDURE — 25000003 PHARM REV CODE 250: Performed by: STUDENT IN AN ORGANIZED HEALTH CARE EDUCATION/TRAINING PROGRAM

## 2022-09-13 PROCEDURE — 94761 N-INVAS EAR/PLS OXIMETRY MLT: CPT

## 2022-09-13 PROCEDURE — 85025 COMPLETE CBC W/AUTO DIFF WBC: CPT | Performed by: PHYSICIAN ASSISTANT

## 2022-09-13 PROCEDURE — 63600175 PHARM REV CODE 636 W HCPCS: Mod: JG | Performed by: STUDENT IN AN ORGANIZED HEALTH CARE EDUCATION/TRAINING PROGRAM

## 2022-09-13 PROCEDURE — 25000242 PHARM REV CODE 250 ALT 637 W/ HCPCS: Performed by: PHYSICIAN ASSISTANT

## 2022-09-13 PROCEDURE — 97161 PT EVAL LOW COMPLEX 20 MIN: CPT

## 2022-09-13 PROCEDURE — 99291 PR CRITICAL CARE, E/M 30-74 MINUTES: ICD-10-PCS | Mod: ,,, | Performed by: ANESTHESIOLOGY

## 2022-09-13 PROCEDURE — P9045 ALBUMIN (HUMAN), 5%, 250 ML: HCPCS | Mod: JG

## 2022-09-13 PROCEDURE — 25000003 PHARM REV CODE 250: Performed by: PHYSICIAN ASSISTANT

## 2022-09-13 PROCEDURE — 63600175 PHARM REV CODE 636 W HCPCS: Performed by: PHYSICIAN ASSISTANT

## 2022-09-13 PROCEDURE — 27000221 HC OXYGEN, UP TO 24 HOURS

## 2022-09-13 PROCEDURE — 25000003 PHARM REV CODE 250

## 2022-09-13 PROCEDURE — 63600175 PHARM REV CODE 636 W HCPCS: Performed by: STUDENT IN AN ORGANIZED HEALTH CARE EDUCATION/TRAINING PROGRAM

## 2022-09-13 PROCEDURE — 84100 ASSAY OF PHOSPHORUS: CPT | Performed by: PHYSICIAN ASSISTANT

## 2022-09-13 PROCEDURE — 99232 PR SUBSEQUENT HOSPITAL CARE,LEVL II: ICD-10-PCS | Mod: ,,, | Performed by: NURSE PRACTITIONER

## 2022-09-13 PROCEDURE — 63600175 PHARM REV CODE 636 W HCPCS

## 2022-09-13 PROCEDURE — 97530 THERAPEUTIC ACTIVITIES: CPT

## 2022-09-13 PROCEDURE — 83735 ASSAY OF MAGNESIUM: CPT | Performed by: PHYSICIAN ASSISTANT

## 2022-09-13 PROCEDURE — 94799 UNLISTED PULMONARY SVC/PX: CPT

## 2022-09-13 PROCEDURE — 99291 CRITICAL CARE FIRST HOUR: CPT | Mod: ,,, | Performed by: ANESTHESIOLOGY

## 2022-09-13 PROCEDURE — P9045 ALBUMIN (HUMAN), 5%, 250 ML: HCPCS | Mod: JG | Performed by: STUDENT IN AN ORGANIZED HEALTH CARE EDUCATION/TRAINING PROGRAM

## 2022-09-13 PROCEDURE — 85730 THROMBOPLASTIN TIME PARTIAL: CPT | Mod: 91

## 2022-09-13 PROCEDURE — 94640 AIRWAY INHALATION TREATMENT: CPT

## 2022-09-13 PROCEDURE — 85025 COMPLETE CBC W/AUTO DIFF WBC: CPT | Mod: 91 | Performed by: STUDENT IN AN ORGANIZED HEALTH CARE EDUCATION/TRAINING PROGRAM

## 2022-09-13 PROCEDURE — 63600175 PHARM REV CODE 636 W HCPCS: Mod: JG

## 2022-09-13 PROCEDURE — 99900035 HC TECH TIME PER 15 MIN (STAT)

## 2022-09-13 PROCEDURE — 97166 OT EVAL MOD COMPLEX 45 MIN: CPT

## 2022-09-13 PROCEDURE — 97535 SELF CARE MNGMENT TRAINING: CPT

## 2022-09-13 PROCEDURE — 85610 PROTHROMBIN TIME: CPT | Mod: 91 | Performed by: THORACIC SURGERY (CARDIOTHORACIC VASCULAR SURGERY)

## 2022-09-13 PROCEDURE — 85025 COMPLETE CBC W/AUTO DIFF WBC: CPT | Mod: 91 | Performed by: THORACIC SURGERY (CARDIOTHORACIC VASCULAR SURGERY)

## 2022-09-13 PROCEDURE — 80053 COMPREHEN METABOLIC PANEL: CPT | Performed by: STUDENT IN AN ORGANIZED HEALTH CARE EDUCATION/TRAINING PROGRAM

## 2022-09-13 RX ORDER — HYDROMORPHONE HYDROCHLORIDE 1 MG/ML
0.5 INJECTION, SOLUTION INTRAMUSCULAR; INTRAVENOUS; SUBCUTANEOUS EVERY 4 HOURS PRN
Status: DISCONTINUED | OUTPATIENT
Start: 2022-09-13 | End: 2022-09-15

## 2022-09-13 RX ORDER — SODIUM,POTASSIUM PHOSPHATES 280-250MG
2 POWDER IN PACKET (EA) ORAL
Status: DISCONTINUED | OUTPATIENT
Start: 2022-09-13 | End: 2022-09-15

## 2022-09-13 RX ORDER — IBUPROFEN 200 MG
24 TABLET ORAL
Status: DISCONTINUED | OUTPATIENT
Start: 2022-09-13 | End: 2022-09-14

## 2022-09-13 RX ORDER — FAMOTIDINE 20 MG/1
20 TABLET, FILM COATED ORAL 2 TIMES DAILY
Status: DISCONTINUED | OUTPATIENT
Start: 2022-09-13 | End: 2022-09-16 | Stop reason: HOSPADM

## 2022-09-13 RX ORDER — AMLODIPINE BESYLATE 5 MG/1
5 TABLET ORAL DAILY
Status: DISCONTINUED | OUTPATIENT
Start: 2022-09-13 | End: 2022-09-15

## 2022-09-13 RX ORDER — ALBUMIN HUMAN 50 G/1000ML
12.5 SOLUTION INTRAVENOUS ONCE
Status: COMPLETED | OUTPATIENT
Start: 2022-09-13 | End: 2022-09-13

## 2022-09-13 RX ORDER — IBUPROFEN 200 MG
16 TABLET ORAL
Status: DISCONTINUED | OUTPATIENT
Start: 2022-09-13 | End: 2022-09-14

## 2022-09-13 RX ORDER — ALBUMIN HUMAN 50 G/1000ML
SOLUTION INTRAVENOUS
Status: COMPLETED
Start: 2022-09-13 | End: 2022-09-13

## 2022-09-13 RX ORDER — METOPROLOL TARTRATE 25 MG/1
12.5 TABLET ORAL ONCE
Status: COMPLETED | OUTPATIENT
Start: 2022-09-13 | End: 2022-09-13

## 2022-09-13 RX ORDER — ESCITALOPRAM OXALATE 5 MG/1
5 TABLET ORAL DAILY
Status: DISCONTINUED | OUTPATIENT
Start: 2022-09-13 | End: 2022-09-16 | Stop reason: HOSPADM

## 2022-09-13 RX ORDER — GLUCAGON 1 MG
1 KIT INJECTION
Status: DISCONTINUED | OUTPATIENT
Start: 2022-09-13 | End: 2022-09-14

## 2022-09-13 RX ORDER — ALBUMIN HUMAN 50 G/1000ML
25 SOLUTION INTRAVENOUS ONCE
Status: COMPLETED | OUTPATIENT
Start: 2022-09-13 | End: 2022-09-13

## 2022-09-13 RX ORDER — LIDOCAINE 50 MG/G
1 PATCH TOPICAL
Status: DISCONTINUED | OUTPATIENT
Start: 2022-09-13 | End: 2022-09-13

## 2022-09-13 RX ORDER — LANOLIN ALCOHOL/MO/W.PET/CERES
800 CREAM (GRAM) TOPICAL
Status: DISCONTINUED | OUTPATIENT
Start: 2022-09-13 | End: 2022-09-15

## 2022-09-13 RX ORDER — INSULIN ASPART 100 [IU]/ML
0-10 INJECTION, SOLUTION INTRAVENOUS; SUBCUTANEOUS
Status: DISCONTINUED | OUTPATIENT
Start: 2022-09-13 | End: 2022-09-14

## 2022-09-13 RX ORDER — LIDOCAINE 50 MG/G
2 PATCH TOPICAL
Status: DISCONTINUED | OUTPATIENT
Start: 2022-09-13 | End: 2022-09-16 | Stop reason: HOSPADM

## 2022-09-13 RX ORDER — ATORVASTATIN CALCIUM 20 MG/1
40 TABLET, FILM COATED ORAL DAILY
Status: DISCONTINUED | OUTPATIENT
Start: 2022-09-13 | End: 2022-09-16 | Stop reason: HOSPADM

## 2022-09-13 RX ORDER — HEPARIN SODIUM 10000 [USP'U]/100ML
400 INJECTION, SOLUTION INTRAVENOUS CONTINUOUS
Status: DISCONTINUED | OUTPATIENT
Start: 2022-09-13 | End: 2022-09-14

## 2022-09-13 RX ADMIN — CEFAZOLIN 2 G: 10 INJECTION, POWDER, FOR SOLUTION INTRAVENOUS at 08:09

## 2022-09-13 RX ADMIN — CEFAZOLIN 2 G: 10 INJECTION, POWDER, FOR SOLUTION INTRAVENOUS at 11:09

## 2022-09-13 RX ADMIN — METHOCARBAMOL 500 MG: 500 TABLET ORAL at 05:09

## 2022-09-13 RX ADMIN — OXYCODONE HYDROCHLORIDE 10 MG: 10 TABLET ORAL at 01:09

## 2022-09-13 RX ADMIN — OXYCODONE HYDROCHLORIDE 10 MG: 10 TABLET ORAL at 08:09

## 2022-09-13 RX ADMIN — ALBUMIN (HUMAN) 25 G: 12.5 SOLUTION INTRAVENOUS at 03:09

## 2022-09-13 RX ADMIN — ACETAMINOPHEN 1000 MG: 325 TABLET ORAL at 03:09

## 2022-09-13 RX ADMIN — ACETAMINOPHEN 1000 MG: 325 TABLET ORAL at 05:09

## 2022-09-13 RX ADMIN — INSULIN ASPART 2 UNITS: 100 INJECTION, SOLUTION INTRAVENOUS; SUBCUTANEOUS at 01:09

## 2022-09-13 RX ADMIN — MAGNESIUM SULFATE 2 G: 2 INJECTION INTRAVENOUS at 07:09

## 2022-09-13 RX ADMIN — HYDROMORPHONE HYDROCHLORIDE 0.5 MG: 1 INJECTION, SOLUTION INTRAMUSCULAR; INTRAVENOUS; SUBCUTANEOUS at 04:09

## 2022-09-13 RX ADMIN — ALBUMIN HUMAN 25 G: 50 SOLUTION INTRAVENOUS at 11:09

## 2022-09-13 RX ADMIN — ALBUMIN HUMAN 12.5 G: 50 SOLUTION INTRAVENOUS at 12:09

## 2022-09-13 RX ADMIN — METHOCARBAMOL 500 MG: 500 TABLET ORAL at 09:09

## 2022-09-13 RX ADMIN — METHOCARBAMOL 500 MG: 500 TABLET ORAL at 08:09

## 2022-09-13 RX ADMIN — POLYETHYLENE GLYCOL 3350 17 G: 17 POWDER, FOR SOLUTION ORAL at 09:09

## 2022-09-13 RX ADMIN — METHOCARBAMOL 500 MG: 500 TABLET ORAL at 01:09

## 2022-09-13 RX ADMIN — OXYCODONE HYDROCHLORIDE 10 MG: 10 TABLET ORAL at 02:09

## 2022-09-13 RX ADMIN — ALBUMIN (HUMAN) 25 G: 12.5 SOLUTION INTRAVENOUS at 11:09

## 2022-09-13 RX ADMIN — DOCUSATE SODIUM 100 MG: 100 CAPSULE ORAL at 09:09

## 2022-09-13 RX ADMIN — ALBUMIN (HUMAN) 12.5 G: 12.5 SOLUTION INTRAVENOUS at 12:09

## 2022-09-13 RX ADMIN — MUPIROCIN 1 G: 20 OINTMENT TOPICAL at 08:09

## 2022-09-13 RX ADMIN — ATORVASTATIN CALCIUM 40 MG: 20 TABLET, FILM COATED ORAL at 09:09

## 2022-09-13 RX ADMIN — ASPIRIN 325 MG ORAL TABLET 325 MG: 325 PILL ORAL at 09:09

## 2022-09-13 RX ADMIN — OXYCODONE HYDROCHLORIDE 10 MG: 10 TABLET ORAL at 10:09

## 2022-09-13 RX ADMIN — HYDROMORPHONE HYDROCHLORIDE 0.5 MG: 1 INJECTION, SOLUTION INTRAMUSCULAR; INTRAVENOUS; SUBCUTANEOUS at 03:09

## 2022-09-13 RX ADMIN — METOCLOPRAMIDE 5 MG: 5 INJECTION, SOLUTION INTRAMUSCULAR; INTRAVENOUS at 10:09

## 2022-09-13 RX ADMIN — ACETAMINOPHEN 1000 MG: 325 TABLET ORAL at 10:09

## 2022-09-13 RX ADMIN — IPRATROPIUM BROMIDE AND ALBUTEROL SULFATE 3 ML: 2.5; .5 SOLUTION RESPIRATORY (INHALATION) at 03:09

## 2022-09-13 RX ADMIN — CEFAZOLIN 2 G: 10 INJECTION, POWDER, FOR SOLUTION INTRAVENOUS at 04:09

## 2022-09-13 RX ADMIN — WARFARIN SODIUM 3 MG: 2 TABLET ORAL at 05:09

## 2022-09-13 RX ADMIN — ONDANSETRON 4 MG: 2 INJECTION INTRAMUSCULAR; INTRAVENOUS at 01:09

## 2022-09-13 RX ADMIN — DOCUSATE SODIUM 100 MG: 100 CAPSULE ORAL at 08:09

## 2022-09-13 RX ADMIN — METOPROLOL TARTRATE 12.5 MG: 25 TABLET, FILM COATED ORAL at 09:09

## 2022-09-13 RX ADMIN — LIDOCAINE 1 PATCH: 50 PATCH CUTANEOUS at 02:09

## 2022-09-13 RX ADMIN — HEPARIN SODIUM 400 UNITS/HR: 5000 INJECTION INTRAVENOUS; SUBCUTANEOUS at 08:09

## 2022-09-13 RX ADMIN — ESCITALOPRAM OXALATE 5 MG: 5 TABLET, FILM COATED ORAL at 09:09

## 2022-09-13 RX ADMIN — HYDROMORPHONE HYDROCHLORIDE 0.5 MG: 1 INJECTION, SOLUTION INTRAMUSCULAR; INTRAVENOUS; SUBCUTANEOUS at 09:09

## 2022-09-13 RX ADMIN — MUPIROCIN 1 G: 20 OINTMENT TOPICAL at 09:09

## 2022-09-13 RX ADMIN — INSULIN ASPART 4 UNITS: 100 INJECTION, SOLUTION INTRAVENOUS; SUBCUTANEOUS at 05:09

## 2022-09-13 RX ADMIN — OXYCODONE HYDROCHLORIDE 10 MG: 10 TABLET ORAL at 05:09

## 2022-09-13 RX ADMIN — FAMOTIDINE 20 MG: 10 INJECTION INTRAVENOUS at 09:09

## 2022-09-13 RX ADMIN — FAMOTIDINE 20 MG: 20 TABLET ORAL at 08:09

## 2022-09-13 NOTE — HOSPITAL COURSE
Alden Ledbetter is a 47 y.o. male with PMHx of HTN, HLD, and GERD with a bicuspid aortic valve and moderate aortic regurgitation in addition to an enlarging thoracic aortic aneurysm (5.4 x 5.3cm on last CT) with a dilated aortic root who was admitted to SICU following a Bentall procedure with Dr. Boudreaux on 09/13. He was extubated shortly after arrival which he tolerated well. He was started on clevidipine which was subsequently weaned off with addition of home antihypertensive medication.

## 2022-09-13 NOTE — ASSESSMENT & PLAN NOTE
BG goal 110-140 (CTS protocol)   Diet clear liquid [facility]; Fluid - 1500mL    Discontinue IV insulin infusion protocol  Start Transition IV insulin infusion at 0.5 u/hr with stepdown parameters (based on insulin infusion rates overnight)  Start Moderate Dose Correction Scale  BG monitoring ac/hs/0200    ** Please call Endocrine for any BG related issues **    Discharge planning: TBD

## 2022-09-13 NOTE — PLAN OF CARE
CM met with the patient and family to discuss discharge planning patient lives with his wife and 2 children he is not on dialysis and does not take coumadin he has transportation home  POA/SPOUSE MICHELLE   PCP DASHAWN Shea [facility] - Surgical Intensive Care  Initial Discharge Assessment       Primary Care Provider: Dashawn Macedo MD    Admission Diagnosis: Thoracic aortic aneurysm without rupture [I71.2]  Bicuspid aortic valve [Q23.1]  Aortic regurgitation due to bicuspid aortic valve [Q23.1]    Admission Date: 9/12/2022  Expected Discharge Date:     Discharge Barriers Identified: None    Payor: BLUE CROSS BLUE SHIELD / Plan: BCBS ALL OUT OF STATE / Product Type: PPO /     Extended Emergency Contact Information  Primary Emergency Contact: Michelle Ledebtter   [facility]  Mobile Phone: [phone]  Relation: Spouse    Discharge Plan A: Home with family, Home  Discharge Plan B: Home, Home with family      [employer] [id number] [place] [address] AT [address] OF [address] & [address] [address]  Phone: [phone] Fax: [phone]      Initial Assessment (most recent)       Adult Discharge Assessment - 09/13/22 0900          Discharge Assessment    Assessment Type Discharge Planning Assessment     Confirmed/corrected address, phone number and insurance Yes     Confirmed Demographics Correct on Facesheet     Source of Information patient;family     When was your last doctors appointment? 06/15/22     Communicated NGA with patient/caregiver Date not available/Unable to determine     Lives With child(amadeo), dependent;spouse     Do you expect to return to your current living situation? Yes     Do you have help at home or someone to help you manage your care at home? Yes     Prior to hospitilization cognitive status: Alert/Oriented     Current cognitive status: Alert/Oriented     Walking or Climbing Stairs Difficulty none     Dressing/Bathing  Difficulty none     Home Layout Able to live on 1st floor     Equipment Currently Used at Home none     Readmission within 30 days? No     Patient currently being followed by outpatient case management? No     Do you currently have service(s) that help you manage your care at home? No     Do you take prescription medications? Yes     Do you have prescription coverage? Yes     Do you have any problems affording any of your prescribed medications? No     Is the patient taking medications as prescribed? yes     Who is going to help you get home at discharge? SPOUSE SNEHA      How do you get to doctors appointments? car, drives self;family or friend will provide     Are you on dialysis? No     Do you take coumadin? No     Discharge Plan A Home with family;Home     Discharge Plan B Home;Home with family     DME Needed Upon Discharge  none     Discharge Plan discussed with: Spouse/sig other;Patient     Discharge Barriers Identified None

## 2022-09-13 NOTE — PLAN OF CARE
Problem: Physical Therapy  Goal: Physical Therapy Goal  Description: Goals to be met by: 22     Patient will increase functional independence with mobility by performin. Supine to sit with Stand-by Assistance  2. Sit to stand transfer with Supervision  3. Gait  x 250 feet with Supervision using No Assistive Device.     2022 1305 by MICHAEL Easton  Outcome: Ongoing, Progressing

## 2022-09-13 NOTE — ASSESSMENT & PLAN NOTE
  Neuro/Psych:     - Sedation: N/A    - Pain:    - Scheduled Tylenol, methocarbamol   - PRN oxycodone, hydromorphone             Cardiac:     - POD #1 s/p Bentall procedure    - BP Goals: MAP >65, SBP <120    - Pressors: N/A    - Anti-HTNs: Clevidipine weaned off yesterday. Will start home amlodipine 5mg today    - Rhythm: NSR in 90s this AM    - Beta blocker: Will start low-dose metoprolol (12.5mg) today and increase as tolerated     - Statin: Will resume home atorvastatin 40mg      Pulmonary:     - Extubated shortly after admission to SICU    - Maintaining adequate oxygenation on 2L NC    - Goal SpO2 >92%    - Chest Tubes x 2 (2 Meds) with 500cc output      Renal:    - Trend BUN/Cr     - Maintain Yanez, record strict Is/Os    - UOP 3660cc/24H, Net +2800cc    Recent Labs   Lab 09/08/22  1501 09/12/22  1401 09/13/22  0313   BUN 12 10 10   CREATININE 1.1 0.8 0.7         FEN / GI:     - Replace electrolytes as needed    - Nutrition: CLD, will advance as tolerated    - Bowel Regimen: docusate      ID:     - Tmax: 98.4F    - WBC: 16.6, post-op leukocytosis downtrending    - Abx: Complete perioperative cefazolin 2g Q8H x 5 doses    Recent Labs   Lab 09/08/22  1501 09/12/22  1401 09/13/22  0313   WBC 6.42 21.21* 16.65*  16.65*         Heme/Onc:     - Hgb 11.1, stable    - CBC daily    - ASA 325mg daily    - Will need anticoagulation due to mechanical bioprosthesis    - Heparin bridge to therapeutic warfarin    Recent Labs   Lab 09/08/22  1501 09/12/22  1401 09/13/22  0313   HGB 13.5* 11.3* 11.1*  11.1*    167 178  178   APTT 25.7 24.4 25.4  25.4   INR 1.0 1.3* 1.2  1.2         Endocrine:     - CTS Goal -140    - HgbA1c: 5.4%    - Endocrinology consult for insulin management      PPx:   Feeding: CLD, advance as tolerated  Analgesia/Sedation: Tylenol, Methocarbamol, PRN oxycodone, hydromorphone  Thromboembolic Prevention: SCDs  HOB >30: Yes  Stress Ulcer: N/A  Glucose Control: Yes, insulin  management per Endocrinology; continue intensive insulin gtt     Lines/Drains/Airway:  PIV x 2: 18G L AC x 1d, 18G R FA x 1d   Art Line: L Brachial x 1d   Central Line: R IJ Introducer x 1d   Yanez x 1d   Chest Tubes: 2 Meds x 1d   Pacing Wires: Ventricular pacing wires      Dispo/Code Status/Palliative:     - Continue SICU Care    - Full Code

## 2022-09-13 NOTE — SUBJECTIVE & OBJECTIVE
Interval History/Significant Events:   Extubated shortly following surgery, tolerated well. Clevidipine started yesterday, weaned off overnight.  No acute events or significant changes overnight. Remained afebrile and hemodynamically stable off vasoactive support.   Received 500cc of albumin overnight for CVP of 3 and mildly decreased UOP.  Up in chair this morning, complaining of some soreness to shoulders > chest.    Follow-up For: Procedure(s) (LRB):  BENTALL PROCEDURE (N/A)    Post-Operative Day: 1 Day Post-Op    Objective:     Vital Signs (Most Recent):  Temp: 98.4 °F (36.9 °C) (09/12/22 1930)  Pulse: 102 (09/13/22 0600)  Resp: (!) 25 (09/13/22 0600)  BP: 120/75 (09/13/22 0600)  SpO2: 99 % (09/13/22 0600)   Vital Signs (24h Range):  Temp:  [97.6 °F (36.4 °C)-98.4 °F (36.9 °C)] 98.4 °F (36.9 °C)  Pulse:  [] 102  Resp:  [11-28] 25  SpO2:  [91 %-100 %] 99 %  BP: (110-157)/(53-75) 120/75  Arterial Line BP: ()/(49-86) 122/58     Weight: 109.8 kg (242 lb 1 oz)  Body mass index is 29.47 kg/m².      Intake/Output Summary (Last 24 hours) at 9/13/2022 0703  Last data filed at 9/13/2022 0600  Gross per 24 hour   Intake 6962.23 ml   Output 4160 ml   Net 2802.23 ml       Physical Exam  Vitals and nursing note reviewed.   Constitutional:       General: He is not in acute distress.     Appearance: Normal appearance.   Neck:      Comments: R IJ MAC Introducer  Cardiovascular:      Rate and Rhythm: Normal rate and regular rhythm.      Pulses: Normal pulses.   Pulmonary:      Effort: Pulmonary effort is normal. No respiratory distress.   Abdominal:      General: Abdomen is flat.      Palpations: Abdomen is soft.   Genitourinary:     Comments: Yanez  Skin:     General: Skin is warm and dry.   Neurological:      General: No focal deficit present.      Mental Status: He is alert.       Lines/Drains/Airways       Central Venous Catheter Line  Duration             Percutaneous Central Line Insertion/Assessment - Triple  Lumen  09/12/22 0911 right internal jugular <1 day              Drain  Duration                  Urethral Catheter 09/12/22 0754 Non-latex;Straight-tip;Temperature probe 14 Fr. <1 day         Y Chest Tube 1 and 2 09/12/22 1249 1 Right Mediastinal 19 Fr. 2 Left Mediastinal 19 Fr. <1 day              Arterial Line  Duration             Arterial Line 09/12/22 0710 Left Other (Comment) <1 day              Line  Duration                  Pacer Wires 09/12/22 1210 <1 day              Peripheral Intravenous Line  Duration                  Peripheral IV - Single Lumen 09/12/22 0620 18 G Left Antecubital 1 day         Peripheral IV - Single Lumen 09/12/22 0800 18 G Anterior;Proximal;Right Forearm <1 day                    Significant Labs:    CBC/Anemia Profile:  Recent Labs   Lab 09/12/22  1401 09/12/22  1404 09/12/22  1506 09/13/22  0313   WBC 21.21*  --   --  16.65*  16.65*   HGB 11.3*  --   --  11.1*  11.1*   HCT 35.1* 34* 33* 33.3*  33.3*     --   --  178  178   MCV 76*  --   --  73*  73*   RDW 18.2*  --   --  18.6*  18.6*        Chemistries:  Recent Labs   Lab 09/12/22  1401 09/12/22  1919 09/13/22 0313     --  132*   K 3.6 4.5 4.2     --  104   CO2 21*  --  23   BUN 10  --  10   CREATININE 0.8  --  0.7   CALCIUM 7.2*  --  7.4*   ALBUMIN 2.5*  --  3.2*   PROT 4.5*  --  5.1*   BILITOT 0.7  --  0.9   ALKPHOS 34*  --  34*   ALT 15  --  19   AST 43*  --  61*   MG 2.0  2.0  --  1.8  1.8   PHOS 2.8  2.8  --  2.6*  2.6*       ABGs:   Recent Labs   Lab 09/12/22  1817   PH 7.348*   PCO2 38.8   HCO3 21.3*   POCSATURATED 96   BE -4     Coagulation:   Recent Labs   Lab 09/13/22 0313   INR 1.2  1.2   APTT 25.4  25.4     All pertinent labs within the past 24 hours have been reviewed.    Significant Imaging:  I have reviewed all pertinent imaging results/findings within the past 24 hours.

## 2022-09-13 NOTE — NURSING
MD notified of patient's complaint nausea. Unrelieved by 4 mg of zofran, gave 5 mg of prochlorperazine IVP. Pt VSS. Will continue to monitor.

## 2022-09-13 NOTE — PROGRESS NOTES
[facility] - [facility] Intensive Care  Critical Care - Surgery  Progress Note    Patient Name: Alden Ledbetter  MRN: [id number]  Admission Date: 9/12/2022  Hospital Length of Stay: 1 days  Code Status: Full Code  Attending Provider: Thomas Boudreaux MD  Primary Care Provider: Warner Macedo MD   Principal Problem: Aortic regurgitation due to bicuspid aortic valve    Subjective:     Hospital/ICU Course:  Alden Ledbetter is a 47 y.o. male with PMHx of HTN, HLD, and GERD with a bicuspid aortic valve and moderate aortic regurgitation in addition to an enlarging thoracic aortic aneurysm (5.4 x 5.3cm on last CT) with a dilated aortic root who was admitted to SICU following a Bentall procedure with Dr. Boudreaux on 09/13. He was extubated shortly after arrival which he tolerated well. He was started on clevidipine which was subsequently weaned off.     Interval History/Significant Events:   Extubated shortly following surgery, tolerated well. Clevidipine started yesterday, weaned off overnight.  No acute events or significant changes overnight. Remained afebrile and hemodynamically stable off vasoactive support.   Received 500cc of albumin overnight for CVP of 3 and mildly decreased UOP.  Up in chair this morning, complaining of some soreness to shoulders > chest.    Follow-up For: Procedure(s) (LRB):  BENTALL PROCEDURE (N/A)    Post-Operative Day: 1 Day Post-Op    Objective:     Vital Signs (Most Recent):  Temp: 98.4 °F (36.9 °C) (09/12/22 1930)  Pulse: 102 (09/13/22 0600)  Resp: (!) 25 (09/13/22 0600)  BP: 120/75 (09/13/22 0600)  SpO2: 99 % (09/13/22 0600)   Vital Signs (24h Range):  Temp:  [97.6 °F (36.4 °C)-98.4 °F (36.9 °C)] 98.4 °F (36.9 °C)  Pulse:  [] 102  Resp:  [11-28] 25  SpO2:  [91 %-100 %] 99 %  BP: (110-157)/(53-75) 120/75  Arterial Line BP: ()/(49-86) 122/58     Weight: 109.8 kg (242 lb 1 oz)  Body mass index is 29.47 kg/m².      Intake/Output Summary (Last 24 hours) at 9/13/2022  0703  Last data filed at 9/13/2022 0600  Gross per 24 hour   Intake 6962.23 ml   Output 4160 ml   Net 2802.23 ml       Physical Exam  Vitals and nursing note reviewed.   Constitutional:       General: He is not in acute distress.     Appearance: Normal appearance.   Neck:      Comments: R IJ MAC Introducer  Cardiovascular:      Rate and Rhythm: Normal rate and regular rhythm.      Pulses: Normal pulses.   Pulmonary:      Effort: Pulmonary effort is normal. No respiratory distress.   Abdominal:      General: Abdomen is flat.      Palpations: Abdomen is soft.   Genitourinary:     Comments: Yanez  Skin:     General: Skin is warm and dry.   Neurological:      General: No focal deficit present.      Mental Status: He is alert.       Lines/Drains/Airways       Central Venous Catheter Line  Duration             Percutaneous Central Line Insertion/Assessment - Triple Lumen  09/12/22 0911 right internal jugular <1 day              Drain  Duration                  Urethral Catheter 09/12/22 0754 Non-latex;Straight-tip;Temperature probe 14 Fr. <1 day         Y Chest Tube 1 and 2 09/12/22 1249 1 Right Mediastinal 19 Fr. 2 Left Mediastinal 19 Fr. <1 day              Arterial Line  Duration             Arterial Line 09/12/22 0710 Left Other (Comment) <1 day              Line  Duration                  Pacer Wires 09/12/22 1210 <1 day              Peripheral Intravenous Line  Duration                  Peripheral IV - Single Lumen 09/12/22 0620 18 G Left Antecubital 1 day         Peripheral IV - Single Lumen 09/12/22 0800 18 G Anterior;Proximal;Right Forearm <1 day                    Significant Labs:    CBC/Anemia Profile:  Recent Labs   Lab 09/12/22  1401 09/12/22  1404 09/12/22  1506 09/13/22  0313   WBC 21.21*  --   --  16.65*  16.65*   HGB 11.3*  --   --  11.1*  11.1*   HCT 35.1* 34* 33* 33.3*  33.3*     --   --  178  178   MCV 76*  --   --  73*  73*   RDW 18.2*  --   --  18.6*  18.6*        Chemistries:  Recent  Labs   Lab 09/12/22  1401 09/12/22  1919 09/13/22  0313     --  132*   K 3.6 4.5 4.2     --  104   CO2 21*  --  23   BUN 10  --  10   CREATININE 0.8  --  0.7   CALCIUM 7.2*  --  7.4*   ALBUMIN 2.5*  --  3.2*   PROT 4.5*  --  5.1*   BILITOT 0.7  --  0.9   ALKPHOS 34*  --  34*   ALT 15  --  19   AST 43*  --  61*   MG 2.0  2.0  --  1.8  1.8   PHOS 2.8  2.8  --  2.6*  2.6*       ABGs:   Recent Labs   Lab 09/12/22  1817   PH 7.348*   PCO2 38.8   HCO3 21.3*   POCSATURATED 96   BE -4     Coagulation:   Recent Labs   Lab 09/13/22 0313   INR 1.2  1.2   APTT 25.4  25.4     All pertinent labs within the past 24 hours have been reviewed.    Significant Imaging:  I have reviewed all pertinent imaging results/findings within the past 24 hours.    Assessment/Plan:     * Aortic regurgitation due to bicuspid aortic valve    Neuro/Psych:     - Sedation: N/A    - Pain:    - Scheduled Tylenol, methocarbamol   - PRN oxycodone, hydromorphone             Cardiac:     - POD #1 s/p Bentall procedure    - BP Goals: MAP >65, SBP <120    - Pressors: N/A    - Anti-HTNs: Clevidipine weaned off yesterday. Will start home amlodipine 5mg today    - Rhythm: NSR in 90s this AM    - Beta blocker: Will start low-dose metoprolol (12.5mg) today and increase as tolerated     - Statin: Will resume home atorvastatin 40mg      Pulmonary:     - Extubated shortly after admission to SICU    - Maintaining adequate oxygenation on 2L NC    - Goal SpO2 >92%    - Chest Tubes x 2 (2 Meds) with 500cc output      Renal:    - Trend BUN/Cr     - Maintain Yanez, record strict Is/Os    - UOP 3660cc/24H, Net +2800cc    Recent Labs   Lab 09/08/22  1501 09/12/22  1401 09/13/22  0313   BUN 12 10 10   CREATININE 1.1 0.8 0.7         FEN / GI:     - Replace electrolytes as needed    - Nutrition: CLD, will advance as tolerated    - Bowel Regimen: docusate      ID:     - Tmax: 98.4F    - WBC: 16.6, post-op leukocytosis downtrending    - Abx: Complete  perioperative cefazolin 2g Q8H x 5 doses    Recent Labs   Lab 09/08/22  1501 09/12/22  1401 09/13/22  0313   WBC 6.42 21.21* 16.65*  16.65*         Heme/Onc:     - Hgb 11.1, stable    - CBC daily    - ASA 325mg daily    - Will need anticoagulation due to mechanical bioprosthesis    - Heparin bridge to therapeutic warfarin    Recent Labs   Lab 09/08/22  1501 09/12/22  1401 09/13/22  0313   HGB 13.5* 11.3* 11.1*  11.1*    167 178  178   APTT 25.7 24.4 25.4  25.4   INR 1.0 1.3* 1.2  1.2         Endocrine:     - CTS Goal -140    - HgbA1c: 5.4%    - Endocrinology consult for insulin management      PPx:   Feeding: CLD, advance as tolerated  Analgesia/Sedation: Tylenol, Methocarbamol, PRN oxycodone, hydromorphone  Thromboembolic Prevention: SCDs  HOB >30: Yes  Stress Ulcer: N/A  Glucose Control: Yes, insulin management per Endocrinology; continue intensive insulin gtt     Lines/Drains/Airway:  PIV x 2: 18G L AC x 1d, 18G R FA x 1d   Art Line: L Brachial x 1d   Central Line: R IJ Introducer x 1d   Yanez x 1d   Chest Tubes: 2 Meds x 1d   Pacing Wires: Ventricular pacing wires      Dispo/Code Status/Palliative:     - Continue SICU Care    - Full Code         Yogesh Landis MD, PGY-3  Anesthesiology  Critical Care - Surgery  [facility] - [facility] Int[facility]

## 2022-09-13 NOTE — BRIEF OP NOTE
[facility] - [facility]  Cardiothoracic Surgery  Operative Note    SUMMARY     Date of Procedure: 9/12/2022     Procedure: Procedure(s) (LRB):  BENTALL PROCEDURE with a 25 mm Carbomedics Carboseal mechanical valved conduit 40310    Surgeon(s) and Role:     * Marla Mcclure MD - Primary     * Juan Layton MD - Fellow    Assisting Surgeon: None    Pre-Operative Diagnosis: Thoracic aortic aneurysm without rupture [I71.2]  Bicuspid aortic valve [Q23.1]    Post-Operative Diagnosis: Post-Op Diagnosis Codes:     * Thoracic aortic aneurysm without rupture [I71.2]     * Bicuspid aortic valve [Q23.1]    Anesthesia: General    Operative Findings (including complications, if any): Bicuspid AV with non-R fusion. Severe AI on intraop GÓMEZ with flow reversal in distal arch.       Estimated Blood Loss (EBL): 100 mL           Implants:   Implant Name Type Inv. Item Serial No.  Lot No. LRB No. Used Action   FELT RHEA [id number] - [id number]  F[employer]HE[employer] [id number]  [employer] [id number] N/A 1 Implanted   Carbomedics Carbo-Seal Valsalva - Ascending Aortic Prosthesis    [id number]  N/A 1 Implanted       Specimens:   Specimen (24h ago, onward)       Start     Ordered    09/12/22 1227  Specimen to Pathology, Surgery Cardiovascular  Once        Comments: Pre-op Diagnosis: Thoracic aortic aneurysm without rupture [I71.2]Bicuspid aortic valve [Q23.1]Procedure(s):BENTALL PROCEDURE Number of specimens: 2Name of specimens: 1.  Ascending Aorta, permanent2.  Aortic valve, permanent     References:    Click here for ordering Quick Tip   Question Answer Comment   Procedure Type: Cardiovascular    Specimen Class: Routine/Screening    Which provider would you like to cc? MARLA MCCLURE    Release to patient Immediate        09/12/22 1227                    Attestation:  I was present and scrubbed for the procedure.

## 2022-09-13 NOTE — SUBJECTIVE & OBJECTIVE
"Interval HPI:   Overnight events: Remains in SICU. POD 1. BG well controlled on IV intensive insulin protocol with infusion rates ranging from 0.5-0.9 u/hr. Diet NPO Except for: Sips with Medication    Eating:   NPO  Nausea: No  Hypoglycemia and intervention: No  Fever: No  TPN and/or TF: No  If yes, type of TF/TPN and rate: n/a    BP (!) 100/57   Pulse (!) 116   Temp 98.3 °F (36.8 °C) (Oral)   Resp (!) 21   Ht 6' 4" (1.93 m)   Wt 109.8 kg (242 lb 1 oz)   SpO2 97%   BMI 29.47 kg/m²     Labs Reviewed and Include    Recent Labs   Lab 09/13/22 0313 09/13/22  0805   *  --    CALCIUM 7.4*  --    ALBUMIN 3.2*  --    PROT 5.1*  --    *  --    K 4.2 4.0   CO2 23  --      --    BUN 10  --    CREATININE 0.7  --    ALKPHOS 34*  --    ALT 19  --    AST 61*  --    BILITOT 0.9  --      Lab Results   Component Value Date    WBC 17.49 (H) 09/13/2022    HGB 10.6 (L) 09/13/2022    HCT 31.4 (L) 09/13/2022    MCV 73 (L) 09/13/2022     09/13/2022     No results for input(s): TSH, FREET4 in the last 168 hours.  Lab Results   Component Value Date    HGBA1C 5.4 09/08/2022       Nutritional status:   Body mass index is 29.47 kg/m².  Lab Results   Component Value Date    ALBUMIN 3.2 (L) 09/13/2022    ALBUMIN 2.5 (L) 09/12/2022    ALBUMIN 4.1 09/08/2022     No results found for: PREALBUMIN    Estimated Creatinine Clearance: 181 mL/min (based on SCr of 0.7 mg/dL).    Accu-Checks  Recent Labs     09/12/22  2305 09/13/22  0001 09/13/22  0104 09/13/22  0200 09/13/22  0312 09/13/22  0415 09/13/22  0501 09/13/22  0604 09/13/22  0700 09/13/22  0808   POCTGLUCOSE 107 104 110 106 108 107 116* 114* 124* 142*       Current Medications and/or Treatments Impacting Glycemic Control  Immunotherapy:    Immunosuppressants       None          Steroids:   Hormones (From admission, onward)      None          Pressors:    Autonomic Drugs (From admission, onward)      Start     Stop Route Frequency Ordered    09/13/22 0945  " metoprolol tartrate (LOPRESSOR) split tablet 12.5 mg         -- Oral Once 09/13/22 0710          Hyperglycemia/Diabetes Medications:   Antihyperglycemics (From admission, onward)      Start     Stop Route Frequency Ordered    09/12/22 1400  insulin regular in 0.9 % NaCl 100 unit/100 mL (1 unit/mL) infusion        Question Answer Comment   Insulin rate changes (DO NOT MODIFY ANSWER) [email or web address] [id number]    Enter initial dose (Units/hr): 1        -- IV Continuous 09/12/22 3873

## 2022-09-13 NOTE — PT/OT/SLP EVAL
Physical Therapy Co- Evaluation with OT    Patient Name:  Alden Ledbetter   MRN:  [id number]    Recommendations:     Discharge Recommendations:  home   Discharge Equipment Recommendations: none   Barriers to discharge: None    Assessment:     Alden Ledbetter is a 47 y.o. male admitted with a medical diagnosis of Aortic regurgitation due to bicuspid aortic valve.  He presents with the following impairments/functional limitations:  impaired endurance, impaired functional mobility, gait instability Pt tolerated treatment well as he was able to stand for 5 minutes while doing ADLs with CGA, but pt decrease in BP and complaints of dizziness decreased functional mobility. Pt will continue to benefit from skilled PT 5x/week in order to increase functional mobility. Pt when medically stable should be able to discharge to home.    Rehab Prognosis: Good; patient would benefit from acute skilled PT services to address these deficits and reach maximum level of function.    Recent Surgery: Procedure(s) (LRB):  BENTALL PROCEDURE (N/A) 1 Day Post-Op    Plan:     During this hospitalization, patient to be seen 5 x/week to address the identified rehab impairments via gait training, therapeutic activities and progress toward the following goals:    Plan of Care Expires:  10/11/22    Subjective     Chief Complaint: pain in back and chest, dizziness  Patient/Family Comments/goals: to get pt back home  Pain/Comfort:  Pain Rating 1: 7/10 (chest and midback)  Pain Addressed 1: Distraction, Nurse notified, Pre-medicate for activity  Pain Rating Post-Intervention 1:  (pt said it was decreasing but didn't give number)    Patients cultural, spiritual, Jew conflicts given the current situation: no    Living Environment:  Pt lives in a 2 story home with wife and children. Pt has bedrooms on the first floor which he will be staying in. Pt has 2 steps to get into the home. Pt has a walk in shower at home. He works as a PA in  oncology. Wife works full time from home.  Prior to admission, patients level of function was independent in all ADLs.  Equipment used at home: none.  DME owned (not currently used): none.  Upon discharge, patient will have assistance from wife.    Objective:     Communicated with nurse prior to session.  Patient found up in chair with arterial line, central line, pulse ox (continuous), blood pressure cuff, telemetry, monteiro catheter, chest tube  upon PT entry to room.    General Precautions: Standard, sternal, fall   Orthopedic Precautions:    Braces:    Respiratory Status: Room air    Exams:  Cognitive Exam:  Patient is oriented to Person, Place, Time, and Situation  RLE ROM: WNL  RLE Strength: WNL  LLE ROM: WNL  LLE Strength: WNL    Functional Mobility:  Transfers: Pt was verbally cued about sternal precautions.    Sit to Stand:  contact guard assistance with no AD  Stand to sit: CGA with no AD    Balance: Pt stood with CGA for 5 minutes at chair while doing ADLs with OT. Pt BP decreased during standing, pt had complaints of dizziness and pt became diaphoretic, pt was return to seated in chair and nurse was notified.      Due to pt complex medical condition, the skill of 2 licensed therapists is needed to maximize treatment session and progression towards goals      Therapeutic Activities and Exercises:   Pt and wife were verbally and with a handout educated on sternal precautions, POC and role of PT. Pt and wife both verbally expressed understanding.    AM-PAC 6 CLICK MOBILITY  Total Score:15     Patient left up in chair with all lines intact, call button in reach, nurse notified, and wife present.    GOALS:   Multidisciplinary Problems       Physical Therapy Goals          Problem: Physical Therapy    Goal Priority Disciplines Outcome Goal Variances Interventions   Physical Therapy Goal     PT, PT/OT Ongoing, Progressing     Description: Goals to be met by: 9/23/22     Patient will increase functional  independence with mobility by performin. Supine to sit with Stand-by Assistance  2. Sit to stand transfer with Supervision  3. Gait  x 250 feet with Supervision using No Assistive Device.                          History:     Past Medical History:   Diagnosis Date    Aortic valve, bicuspid 2020    echo    GERD (gastroesophageal reflux disease)     Hyperlipidemia     Hypertension     Male hypogonadism        Past Surgical History:   Procedure Laterality Date    BENTALL PROCEDURE FOR REPLACEMENT OF AORTIC VALVE, AORTIC ROOT, AND ASCENDING AORTA N/A 2022    Procedure: BENTALL PROCEDURE;  Surgeon: Thomas Boudreaux MD;  Location: [facility] OR [address] [place] [address];  Service: Cardiothoracic;  Laterality: N/A;  with hemiarch replacement    ELBOW SURGERY Right     LEFT HEART CATHETERIZATION N/A 2022    Procedure: Left heart cath;  Surgeon: Felix Monsalve MD;  Location: [facility] CATH LAB;  Service: Cardiology;  Laterality: N/A;    NASAL SEPTUM SURGERY         Time Tracking:     PT Received On: 22  PT Start Time: 902     PT Stop Time: 920  PT Total Time (min): 18 min     Billable Minutes: Evaluation 8 min and Therapeutic Activity 10 minutes      2022

## 2022-09-13 NOTE — OP NOTE
DATE OF PROCEDURE:  09/12/2022     ATTENDING SURGEON:  Thomas Boudreaux M.D.     ASSISTANT:  Juan Layton M.D. (Cardiothoracic resident).     PREOPERATIVE DIAGNOSES:  1.  Moderate aortic insufficiency  2.  Aortic root aneurysm.  3.  Hypertension     POSTOPERATIVE DIAGNOSES:  1.  Moderate aortic insufficiency  2.  Aortic root aneurysm.  3.  Hypertension    OPERATION PERFORMED:  Aortic root replacement (Bentall procedure) with a 25 mm Carbomedics cardioseal mechanical valved conduit     ANESTHESIA:  General endotracheal.     ESTIMATED BLOOD LOSS:  100 mL.     BRIEF HISTORY:  Mr. Ledbetter is a very pleasant 47-year-old gentleman who has been followed for hypertension and a family history of heart disease.  He had an echo which demonstrated a bicuspid aortic valve as well as an ascending aortic aneurysm.  This was done in October of 2020.  He had a follow-up study in July of this year which demonstrated significant interval increase in the size of his ascending aortic aneurysm.  His aneurysmal dimensions were confirmed with a CT scan.  Given the size of his aneurysm, and the change over time, surgical correction was recommended.  We had a long preoperative discussion regarding management of his bicuspid aortic valve.  He elected to proceed with replacement.     PROCEDURE IN DETAIL:  After obtaining informed and written consent, the patient   was brought to the Operating Room and placed on the operating table in supine   position.  After induction of adequate general endotracheal anesthesia, the   patient's chest, abdomen, pelvis and bilateral lower extremities were prepped   and draped in the usual sterile fashion.  An upper midline skin incision was   made, and a median sternotomy was performed.  A pericardial well was created.    The patient was systemically heparinized.  Cannulation sutures were placed in   the proximal aortic arch and in the right atrial appendage.  The aortic cannula   was inserted, followed by  the venous.  Antegrade and retrograde cardioplegia   catheters were placed.  The patient was then put on cardiopulmonary bypass.    Once on bypass, a left ventricular vent was placed through the right superior   pulmonary vein.  The aorta was  from pulmonary artery.  The aortic   crossclamp was applied, and the heart was arrested using cold blood enhanced   antegrade cardioplegia.  We then changed to retrograde cardioplegia. A prompt electromechanical arrest was achieved.  Once   the cardioplegia was all in, the aorta was transected at the level of the right   pulmonary artery.  It was resected distally to within 1 cm of the clamp, which   had been placed just proximal to the origin of the innominate artery.    Proximally, the aorta was resected to the level of the sinotubular junction.    Sutures were placed at all three commissures.  The ostium of the left main   coronary artery was identified, and cut out as a Carrel patch.  It was marked   with a silk suture to prevent rotation.  The same was done for the right.  We   then addressed the aortic valve.  It was congenitally bicuspid, with complete   fusion between the right and noncoronary cusps. The leaflets were resected. Once the annulus was satisfactory, the ventricle was irrigated with 1 liter of cold saline.  The annulus was sized, and a 25 mm valve was selected. While the valved conduit was being retrieved,   multiple pledgeted 2-0 Ethibond sutures were placed circumferentially around the   annulus. The valved conduit was then brought up, and the annular sutures were   passed through the sewing ring of the valve.  The valve was slid into position.    It seated nicely.  The sutures were tied and then cut.  A site suitable for   reimplantation of the left main was identified and marked.  A defect was created   in the Valsalva portion of the Dacron tube graft using the eye cautery.  The   left main was then reimplanted using a running 5-0 Prolene  suture.  Once the   left main was reimplanted, retrograde cardioplegia was given.  Effluent was   seen to come from the ostium of the left main.  The distal portion of the   Dacron tube graft was then tailored, and the graft to aorta anastomosis was   performed using a running 4-0 Prolene suture reinforced with a felt strip.  A   site suitable for reimplantation of the right coronary was identified and   marked.  A defect was created in the Dacron tube graft using the eye cautery.    The right coronary was then reimplanted using a running 5-0 Prolene suture.  An   aortic root vent was placed into the Dacron tube graft.  The hot shot was given   retrograde, and de-airing maneuvers were performed.  Once the hot shot was all   in, the aortic cross-clamp was removed.  The patient was subsequently weaned   from cardiopulmonary bypass.  The patient did separate easily from bypass.  Once   off bypass, all surgical sites were inspected.  There was good hemostasis.  The   valve was evaluated using GÓMEZ, and appeared to be functioning properly.  The   test dose of protamine was administered, and this was well tolerated.  The total   dose was then given.  MCC through the total dose, all cannulas were   removed.  All surgical sites were again inspected, and again there was good hemostasis. Ventricular pacing wires were placed.  Drains were placed.  After achieving   adequate hemostasis, the patient's chest was closed using #6 stainless steel   wires to reapproximate the sternum.  The overlying soft tissues were   reapproximated using absorbable suture material.  The patient's chest was washed   and dried, and a dry dressing was applied.  The patient tolerated the procedure   well, and there were no complications.  At the conclusion of the case, sponge and   instrument counts were correct.

## 2022-09-13 NOTE — PT/OT/SLP EVAL
Occupational Therapy   Co-Evaluation/Treatment with PT    Name: Alden Lebdetter  MRN: [id number]  Admitting Diagnosis:  Aortic regurgitation due to bicuspid aortic valve  Recent Surgery: Procedure(s) (LRB):  BENTALL PROCEDURE (N/A) 1 Day Post-Op    Recommendations:     Discharge Recommendations: home  Discharge Equipment Recommendations:  none  Barriers to discharge:  None    Assessment:     Alden Ledbetter is a 47 y.o. male with a medical diagnosis of Aortic regurgitation due to bicuspid aortic valve.  He presents with c/o of 7/10 chest and back pain. Pt is s/p Bentall procedure 9/13/22. Pt limited by hypotension during standing ADLs. He is currently performing tasks below baseline. Performance deficits affecting function: impaired endurance, impaired self care skills, impaired functional mobility, gait instability, impaired balance, pain, impaired cardiopulmonary response to activity.  Pt would benefit from skilled OT services in order to maximize independence with ADLs and facilitate safe discharge. Anticipating pt will be able to return home with no additional OT needs when medically stable.      Rehab Prognosis: Good; patient would benefit from acute skilled OT services to address these deficits and reach maximum level of function.       Plan:     Patient to be seen 5 x/week to address the above listed problems via self-care/home management, therapeutic activities, therapeutic exercises  Plan of Care Expires: 10/13/22  Plan of Care Reviewed with: patient, spouse    Subjective     Chief Complaint: dizziness in standing   Patient/Family Comments/goals: to return home    Occupational Profile:  Living Environment: Pt, his wife, and 2 children in a 2  with 2 [facility]. 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  Patient found up in chair with arterial line, central line, pulse ox (continuous), blood pressure cuff, telemetry, chest tube, monteiro catheter upon OT entry to room.    General Precautions: Standard, sternal, fall   Orthopedic Precautions:N/A   Braces: N/A  Respiratory Status: Nasal cannula, flow 2 L/min    Occupational Performance:    Functional Mobility/Transfers:  Patient completed Sit <> Stand Transfer with contact guard assistance  with  hand-held assist   Functional Mobility: unable 2' hypotension with standing ADLs    Activities of Daily Living:  Grooming: contact guard assistance to perform oral care and facial hygiene in standing; pt with c/o of dizziness in standing with hypotension noted; pt returned seated where dizziness resolved; RN notified  Upper Body Dressing: minimum assistance to don/doff posterior gown    Cognitive/Visual Perceptual:  Cognitive/Psychosocial Skills:     -       Oriented to: Person, Place, Time, and Situation   -       Follows Commands/attention:Follows multistep  commands  -       Communication: clear/fluent  -       Memory: No Deficits noted  -       Safety awareness/insight to disability: intact   -       Mood/Affect/Coping skills/emotional control: Appropriate to situation    Physical Exam:  Sensation:    -       Intact  Upper Extremity Range of Motion:     -       Right Upper Extremity: WFL  -       Left Upper Extremity: WFL  Upper Extremity Strength:    -       Right Upper Extremity: WFL  -       Left Upper Extremity: WFL   Strength:    -       Right Upper Extremity: WFL  -       Left Upper Extremity: WFL  Fine Motor Coordination:    -       Intact    AMPAC 6 Click ADL:  AMPAC Total Score: 16    Treatment &  Education:  -Therapist provided facilitation and instruction of proper body mechanics, energy conservation, and fall prevention strategies during tasks listed above.  -Pt educated on role of OT, POC and goals for therapy  -Pt educated on importance of OOB activities with staff member assistance and sitting OOB majority of the day.   -Pt verbalized understanding. Pt expressed no further concerns/questions  -Whiteboard updated   Evaluation completed with PT to best establish plan of care for acute setting.     Patient left up in chair with all lines intact, call button in reach, RN notified, and spouse present    GOALS:   Multidisciplinary Problems       Occupational Therapy Goals          Problem: Occupational Therapy    Goal Priority Disciplines Outcome Interventions   Occupational Therapy Goal     OT, PT/OT Ongoing, Progressing    Description: Goals to be met by: 9/27/22     Patient will increase functional independence with ADLs by performing:    Feeding with [place].  UE Dressing with [place].  LE Dressing with [place].  Grooming while standing at sink with [place].  Toileting from toilet with [place] for hygiene and clothing management.   Toilet transfer to toilet with [place].  Pt will engage in functional mobility to simulate household distances in order to maximize functional activity tolerance required for engagement in occupations of choice with supervision.                         History:     Past Medical History:   Diagnosis Date    Aortic valve, bicuspid 2020    echo    GERD (gastroesophageal reflux disease)     Hyperlipidemia     Hypertension     Male hypogonadism          Past Surgical History:   Procedure Laterality Date    BENTALL PROCEDURE FOR REPLACEMENT OF AORTIC VALVE, AORTIC ROOT, AND ASCENDING AORTA N/A 9/12/2022    Procedure: BENTALL PROCEDURE;  Surgeon: Thomas Boudreaux MD;  Location: [facility] OR [address] [place] [address];  Service: Cardiothoracic;  Laterality: N/A;  with hemiarch  replacement    ELBOW SURGERY Right     LEFT HEART CATHETERIZATION N/A 8/24/2022    Procedure: Left heart cath;  Surgeon: Felix Monsalve MD;  Location: [facility] CATH LAB;  Service: Cardiology;  Laterality: N/A;    NASAL SEPTUM SURGERY         Time Tracking:     OT Date of Treatment: 09/13/22  OT Start Time: 0902  OT Stop Time: 0921  OT Total Time (min): 19 min    Billable Minutes:Evaluation 10  Self Care/Home Management 9    9/13/2022

## 2022-09-13 NOTE — PROGRESS NOTES
"[facility] - [facility]  Endocrinology  Progress Note    Admit Date: 9/12/2022     Reason for Consult: Management of Hyperglycemia     Surgical Procedure and Date:   9/12/22  BENTALL PROCEDURE (N/A)      HPI:   Patient is a 47 y.o. male with a diagnosis of GERD, HLD, HTN, bicuspid AV with moderate AR, and 5.4 x 5.3cm thoracic aortic aneurysm. Patient now presents for planned hemiarch replacement, aortic root replacement, and mechanical valve. Endocrinology consulted for management of hyperglycemia.     Lab Results   Component Value Date    HGBA1C 5.4 09/08/2022           Interval HPI:   Overnight events: Remains in SICU. POD 1. BG well controlled on IV intensive insulin protocol with infusion rates ranging from 0.5-0.9 u/hr. Diet NPO Except for: Sips with Medication    Eating:   NPO  Nausea: No  Hypoglycemia and intervention: No  Fever: No  TPN and/or TF: No  If yes, type of TF/TPN and rate: n/a    BP (!) 100/57   Pulse (!) 116   Temp 98.3 °F (36.8 °C) (Oral)   Resp (!) 21   Ht 6' 4" (1.93 m)   Wt 109.8 kg (242 lb 1 oz)   SpO2 97%   BMI 29.47 kg/m²     Labs Reviewed and Include    Recent Labs   Lab 09/13/22  0313 09/13/22  0805   *  --    CALCIUM 7.4*  --    ALBUMIN 3.2*  --    PROT 5.1*  --    *  --    K 4.2 4.0   CO2 23  --      --    BUN 10  --    CREATININE 0.7  --    ALKPHOS 34*  --    ALT 19  --    AST 61*  --    BILITOT 0.9  --      Lab Results   Component Value Date    WBC 17.49 (H) 09/13/2022    HGB 10.6 (L) 09/13/2022    HCT 31.4 (L) 09/13/2022    MCV 73 (L) 09/13/2022     09/13/2022     No results for input(s): TSH, FREET4 in the last 168 hours.  Lab Results   Component Value Date    HGBA1C 5.4 09/08/2022       Nutritional status:   Body mass index is 29.47 kg/m².  Lab Results   Component Value Date    ALBUMIN 3.2 (L) 09/13/2022    ALBUMIN 2.5 (L) 09/12/2022    ALBUMIN 4.1 09/08/2022     No results found for: PREALBUMIN    Estimated Creatinine Clearance: 181 " mL/min (based on SCr of 0.7 mg/dL).    Accu-Checks  Recent Labs     09/12/22  2305 09/13/22  0001 09/13/22  0104 09/13/22  0200 09/13/22  0312 09/13/22  0415 09/13/22  0501 09/13/22  0604 09/13/22  0700 09/13/22  0808   POCTGLUCOSE 107 104 110 106 108 107 116* 114* 124* 142*       Current Medications and/or Treatments Impacting Glycemic Control  Immunotherapy:    Immunosuppressants       None          Steroids:   Hormones (From admission, onward)      None          Pressors:    Autonomic Drugs (From admission, onward)      Start     Stop Route Frequency Ordered    09/13/22 0945  metoprolol tartrate (LOPRESSOR) split tablet 12.5 mg         -- Oral Once 09/13/22 0710          Hyperglycemia/Diabetes Medications:   Antihyperglycemics (From admission, onward)      Start     Stop Route Frequency Ordered    09/12/22 1400  insulin regular in 0.9 % NaCl 100 unit/100 mL (1 unit/mL) infusion        Question Answer Comment   Insulin rate changes (DO NOT MODIFY ANSWER) [email or web address] [email or web address]    Enter initial dose (Units/hr): 1        -- IV Continuous 09/12/22 1345            ASSESSMENT and PLAN    * Aortic regurgitation due to bicuspid aortic valve  Managed per primary team  Optimize BG control        Stress hyperglycemia  BG goal 110-140 ([facility] protocol)   Diet clear liquid [facility]; Fluid - 1500mL    Discontinue IV insulin infusion protocol  Start Transition IV insulin infusion at 0.5 u/hr with stepdown parameters (based on insulin infusion rates overnight)  Start Moderate Dose Correction Scale  BG monitoring ac/hs/0200    ** Please call Endocrine for any BG related issues **    Discharge planning: TBD        Mixed hyperlipidemia  May increase insulin resistance.             Dora Johnson NP  Endocrinology  [facility]

## 2022-09-13 NOTE — PLAN OF CARE
Pt free from falls and injury this shift. All VSS at this time. Pain moderately controlled with prn and scheduled medication, intermittent nausea related to pain medication. Sats >92% on 1L NC. MAPs maintained 60-80. Continuous insulin infusing. Abdomen rounded,nondistended, no BM this shift. Belching. Midsternal incision dsg intact, some dried drainage noted. Chest tubes with 1.1L sanguinous output this shift. Team is aware. Yanez intact, uop minimal 30-60cc/hr. OOB to chair all shift, worked well with PT/OT. No other significant events this shift. POC reviewed with patient and wife, all questions answered.

## 2022-09-13 NOTE — PLAN OF CARE
"SICU PLAN OF CARE NOTE    Dx: Aortic regurgitation due to bicuspid aortic valve    Goals of Care:  MAP 60-80    Vital Signs:  /75 (BP Location: Left arm, Patient Position: Sitting)   Pulse 102   Temp 98.4 °F (36.9 °C) (Oral)   Resp (!) 25   Ht 6' 4" (1.93 m)   Wt 109.8 kg (242 lb 1 oz)   SpO2 99%   BMI 29.47 kg/m²     Cardiac:  NSR    Resp:  SpO2 98% on 2L     Neuro:  AAO x4, Follows Commands, and Moves All Extremities    Gtts:  Precedex and Insulin    Urine Output:  Urinary Catheter 30-35 cc/hr    Drains:  Chest Tube, total output  cc /  shift    Diet:  NPO     Labs/Accuchecks:  accucheck q1hr    Skin:  All skin remains free from injury.  Patient turned Q2, waffle mattress inflated, ICU bed working correctly.    Shift Events:  See flowsheet for further assessment/details. Family updated on current condition/plan of care, questions answered, and emotional support provided.   MD updated on current condition, vitals, labs, and gtts.  No new orders received, will continue to monitor.    "

## 2022-09-14 LAB
ALBUMIN SERPL BCP-MCNC: 3.4 G/DL (ref 3.5–5.2)
ALP SERPL-CCNC: 28 U/L (ref 55–135)
ALT SERPL W/O P-5'-P-CCNC: 11 U/L (ref 10–44)
ANION GAP SERPL CALC-SCNC: 7 MMOL/L (ref 8–16)
APTT BLDCRRT: 31.5 SEC (ref 21–32)
APTT BLDCRRT: 37 SEC (ref 21–32)
APTT BLDCRRT: 37.7 SEC (ref 21–32)
AST SERPL-CCNC: 40 U/L (ref 10–40)
BASOPHILS # BLD AUTO: 0.05 K/UL (ref 0–0.2)
BASOPHILS NFR BLD: 0.3 % (ref 0–1.9)
BILIRUB SERPL-MCNC: 0.8 MG/DL (ref 0.1–1)
BUN SERPL-MCNC: 12 MG/DL (ref 6–20)
CALCIUM SERPL-MCNC: 7.6 MG/DL (ref 8.7–10.5)
CHLORIDE SERPL-SCNC: 103 MMOL/L (ref 95–110)
CO2 SERPL-SCNC: 23 MMOL/L (ref 23–29)
CREAT SERPL-MCNC: 0.9 MG/DL (ref 0.5–1.4)
DIFFERENTIAL METHOD: ABNORMAL
EOSINOPHIL # BLD AUTO: 0 K/UL (ref 0–0.5)
EOSINOPHIL NFR BLD: 0.1 % (ref 0–8)
ERYTHROCYTE [DISTWIDTH] IN BLOOD BY AUTOMATED COUNT: 18.5 % (ref 11.5–14.5)
EST. GFR  (NO RACE VARIABLE): >60 ML/MIN/1.73 M^2
GLUCOSE SERPL-MCNC: 127 MG/DL (ref 70–110)
HCT VFR BLD AUTO: 23.8 % (ref 40–54)
HGB BLD-MCNC: 7.7 G/DL (ref 14–18)
IMM GRANULOCYTES # BLD AUTO: 0.09 K/UL (ref 0–0.04)
IMM GRANULOCYTES NFR BLD AUTO: 0.5 % (ref 0–0.5)
INR PPP: 1.3 (ref 0.8–1.2)
LYMPHOCYTES # BLD AUTO: 1.9 K/UL (ref 1–4.8)
LYMPHOCYTES NFR BLD: 11.5 % (ref 18–48)
MAGNESIUM SERPL-MCNC: 2.1 MG/DL (ref 1.6–2.6)
MCH RBC QN AUTO: 24.3 PG (ref 27–31)
MCHC RBC AUTO-ENTMCNC: 32.4 G/DL (ref 32–36)
MCV RBC AUTO: 75 FL (ref 82–98)
MONOCYTES # BLD AUTO: 2 K/UL (ref 0.3–1)
MONOCYTES NFR BLD: 11.7 % (ref 4–15)
NEUTROPHILS # BLD AUTO: 12.6 K/UL (ref 1.8–7.7)
NEUTROPHILS NFR BLD: 75.9 % (ref 38–73)
NRBC BLD-RTO: 0 /100 WBC
PHOSPHATE SERPL-MCNC: 1.5 MG/DL (ref 2.7–4.5)
PLATELET # BLD AUTO: 177 K/UL (ref 150–450)
PMV BLD AUTO: 9.9 FL (ref 9.2–12.9)
POCT GLUCOSE: 105 MG/DL (ref 70–110)
POCT GLUCOSE: 122 MG/DL (ref 70–110)
POCT GLUCOSE: 127 MG/DL (ref 70–110)
POCT GLUCOSE: 136 MG/DL (ref 70–110)
POCT GLUCOSE: 137 MG/DL (ref 70–110)
POTASSIUM SERPL-SCNC: 3.9 MMOL/L (ref 3.5–5.1)
POTASSIUM SERPL-SCNC: 4.1 MMOL/L (ref 3.5–5.1)
POTASSIUM SERPL-SCNC: 4.1 MMOL/L (ref 3.5–5.1)
PROT SERPL-MCNC: 5.3 G/DL (ref 6–8.4)
PROTHROMBIN TIME: 13 SEC (ref 9–12.5)
RBC # BLD AUTO: 3.17 M/UL (ref 4.6–6.2)
SODIUM SERPL-SCNC: 133 MMOL/L (ref 136–145)
WBC # BLD AUTO: 16.63 K/UL (ref 3.9–12.7)

## 2022-09-14 PROCEDURE — 20600001 HC STEP DOWN PRIVATE ROOM

## 2022-09-14 PROCEDURE — 99232 PR SUBSEQUENT HOSPITAL CARE,LEVL II: ICD-10-PCS | Mod: ,,, | Performed by: NURSE PRACTITIONER

## 2022-09-14 PROCEDURE — 84100 ASSAY OF PHOSPHORUS: CPT | Performed by: PHYSICIAN ASSISTANT

## 2022-09-14 PROCEDURE — 99291 PR CRITICAL CARE, E/M 30-74 MINUTES: ICD-10-PCS | Mod: ,,, | Performed by: INTERNAL MEDICINE

## 2022-09-14 PROCEDURE — 25000003 PHARM REV CODE 250: Performed by: STUDENT IN AN ORGANIZED HEALTH CARE EDUCATION/TRAINING PROGRAM

## 2022-09-14 PROCEDURE — 63600175 PHARM REV CODE 636 W HCPCS: Performed by: STUDENT IN AN ORGANIZED HEALTH CARE EDUCATION/TRAINING PROGRAM

## 2022-09-14 PROCEDURE — 25000003 PHARM REV CODE 250

## 2022-09-14 PROCEDURE — 94799 UNLISTED PULMONARY SVC/PX: CPT

## 2022-09-14 PROCEDURE — 25000003 PHARM REV CODE 250: Performed by: PHYSICIAN ASSISTANT

## 2022-09-14 PROCEDURE — 83735 ASSAY OF MAGNESIUM: CPT | Performed by: PHYSICIAN ASSISTANT

## 2022-09-14 PROCEDURE — 94761 N-INVAS EAR/PLS OXIMETRY MLT: CPT

## 2022-09-14 PROCEDURE — 36415 COLL VENOUS BLD VENIPUNCTURE: CPT | Performed by: THORACIC SURGERY (CARDIOTHORACIC VASCULAR SURGERY)

## 2022-09-14 PROCEDURE — 84132 ASSAY OF SERUM POTASSIUM: CPT | Performed by: PHYSICIAN ASSISTANT

## 2022-09-14 PROCEDURE — 85730 THROMBOPLASTIN TIME PARTIAL: CPT | Performed by: PHYSICIAN ASSISTANT

## 2022-09-14 PROCEDURE — 63600175 PHARM REV CODE 636 W HCPCS: Performed by: PHYSICIAN ASSISTANT

## 2022-09-14 PROCEDURE — 85610 PROTHROMBIN TIME: CPT | Performed by: PHYSICIAN ASSISTANT

## 2022-09-14 PROCEDURE — 63600175 PHARM REV CODE 636 W HCPCS

## 2022-09-14 PROCEDURE — 80053 COMPREHEN METABOLIC PANEL: CPT | Performed by: STUDENT IN AN ORGANIZED HEALTH CARE EDUCATION/TRAINING PROGRAM

## 2022-09-14 PROCEDURE — 99232 SBSQ HOSP IP/OBS MODERATE 35: CPT | Mod: ,,, | Performed by: NURSE PRACTITIONER

## 2022-09-14 PROCEDURE — 85730 THROMBOPLASTIN TIME PARTIAL: CPT | Mod: 91 | Performed by: THORACIC SURGERY (CARDIOTHORACIC VASCULAR SURGERY)

## 2022-09-14 PROCEDURE — 99291 CRITICAL CARE FIRST HOUR: CPT | Mod: ,,, | Performed by: INTERNAL MEDICINE

## 2022-09-14 PROCEDURE — 97116 GAIT TRAINING THERAPY: CPT

## 2022-09-14 PROCEDURE — 85025 COMPLETE CBC W/AUTO DIFF WBC: CPT | Performed by: PHYSICIAN ASSISTANT

## 2022-09-14 RX ORDER — METOPROLOL TARTRATE 25 MG/1
12.5 TABLET ORAL 2 TIMES DAILY
Status: DISCONTINUED | OUTPATIENT
Start: 2022-09-14 | End: 2022-09-15

## 2022-09-14 RX ORDER — METOPROLOL TARTRATE 1 MG/ML
5 INJECTION, SOLUTION INTRAVENOUS ONCE
Status: COMPLETED | OUTPATIENT
Start: 2022-09-14 | End: 2022-09-14

## 2022-09-14 RX ORDER — FUROSEMIDE 10 MG/ML
20 INJECTION INTRAMUSCULAR; INTRAVENOUS 2 TIMES DAILY
Status: DISCONTINUED | OUTPATIENT
Start: 2022-09-14 | End: 2022-09-14

## 2022-09-14 RX ORDER — GLUCAGON 1 MG
1 KIT INJECTION
Status: DISCONTINUED | OUTPATIENT
Start: 2022-09-14 | End: 2022-09-16

## 2022-09-14 RX ORDER — ASPIRIN 81 MG/1
81 TABLET ORAL DAILY
Status: DISCONTINUED | OUTPATIENT
Start: 2022-09-14 | End: 2022-09-16 | Stop reason: HOSPADM

## 2022-09-14 RX ORDER — WARFARIN SODIUM 5 MG/1
5 TABLET ORAL DAILY
Status: COMPLETED | OUTPATIENT
Start: 2022-09-14 | End: 2022-09-14

## 2022-09-14 RX ORDER — HEPARIN SODIUM 10000 [USP'U]/100ML
800 INJECTION, SOLUTION INTRAVENOUS CONTINUOUS
Status: DISCONTINUED | OUTPATIENT
Start: 2022-09-14 | End: 2022-09-14

## 2022-09-14 RX ORDER — IBUPROFEN 200 MG
16 TABLET ORAL
Status: DISCONTINUED | OUTPATIENT
Start: 2022-09-14 | End: 2022-09-16

## 2022-09-14 RX ORDER — IBUPROFEN 200 MG
24 TABLET ORAL
Status: DISCONTINUED | OUTPATIENT
Start: 2022-09-14 | End: 2022-09-16

## 2022-09-14 RX ORDER — INSULIN ASPART 100 [IU]/ML
1-10 INJECTION, SOLUTION INTRAVENOUS; SUBCUTANEOUS
Status: DISCONTINUED | OUTPATIENT
Start: 2022-09-14 | End: 2022-09-16

## 2022-09-14 RX ORDER — HEPARIN SODIUM 10000 [USP'U]/100ML
1000 INJECTION, SOLUTION INTRAVENOUS CONTINUOUS
Status: DISCONTINUED | OUTPATIENT
Start: 2022-09-14 | End: 2022-09-15

## 2022-09-14 RX ADMIN — ACETAMINOPHEN 1000 MG: 325 TABLET ORAL at 09:09

## 2022-09-14 RX ADMIN — METHOCARBAMOL 500 MG: 500 TABLET ORAL at 05:09

## 2022-09-14 RX ADMIN — HYDROMORPHONE HYDROCHLORIDE 0.5 MG: 1 INJECTION, SOLUTION INTRAMUSCULAR; INTRAVENOUS; SUBCUTANEOUS at 12:09

## 2022-09-14 RX ADMIN — MUPIROCIN 1 G: 20 OINTMENT TOPICAL at 10:09

## 2022-09-14 RX ADMIN — ATORVASTATIN CALCIUM 40 MG: 20 TABLET, FILM COATED ORAL at 10:09

## 2022-09-14 RX ADMIN — ESCITALOPRAM OXALATE 5 MG: 5 TABLET, FILM COATED ORAL at 10:09

## 2022-09-14 RX ADMIN — METHOCARBAMOL 500 MG: 500 TABLET ORAL at 10:09

## 2022-09-14 RX ADMIN — METOROPROLOL TARTRATE 5 MG: 5 INJECTION, SOLUTION INTRAVENOUS at 12:09

## 2022-09-14 RX ADMIN — FAMOTIDINE 20 MG: 20 TABLET ORAL at 09:09

## 2022-09-14 RX ADMIN — AMLODIPINE BESYLATE 5 MG: 5 TABLET ORAL at 10:09

## 2022-09-14 RX ADMIN — WARFARIN SODIUM 5 MG: 5 TABLET ORAL at 04:09

## 2022-09-14 RX ADMIN — HYDROMORPHONE HYDROCHLORIDE 0.5 MG: 1 INJECTION, SOLUTION INTRAMUSCULAR; INTRAVENOUS; SUBCUTANEOUS at 01:09

## 2022-09-14 RX ADMIN — METOPROLOL TARTRATE 12.5 MG: 25 TABLET, FILM COATED ORAL at 09:09

## 2022-09-14 RX ADMIN — ONDANSETRON 4 MG: 2 INJECTION INTRAMUSCULAR; INTRAVENOUS at 12:09

## 2022-09-14 RX ADMIN — DOCUSATE SODIUM 100 MG: 100 CAPSULE ORAL at 10:09

## 2022-09-14 RX ADMIN — ASPIRIN 81 MG: 81 TABLET, COATED ORAL at 10:09

## 2022-09-14 RX ADMIN — OXYCODONE HYDROCHLORIDE 10 MG: 10 TABLET ORAL at 09:09

## 2022-09-14 RX ADMIN — CEFAZOLIN 2 G: 10 INJECTION, POWDER, FOR SOLUTION INTRAVENOUS at 04:09

## 2022-09-14 RX ADMIN — POTASSIUM & SODIUM PHOSPHATES POWDER PACK 280-160-250 MG 2 PACKET: 280-160-250 PACK at 04:09

## 2022-09-14 RX ADMIN — FUROSEMIDE 20 MG: 10 INJECTION, SOLUTION INTRAMUSCULAR; INTRAVENOUS at 10:09

## 2022-09-14 RX ADMIN — DOCUSATE SODIUM 100 MG: 100 CAPSULE ORAL at 09:09

## 2022-09-14 RX ADMIN — METOCLOPRAMIDE 5 MG: 5 INJECTION, SOLUTION INTRAMUSCULAR; INTRAVENOUS at 04:09

## 2022-09-14 RX ADMIN — METHOCARBAMOL 500 MG: 500 TABLET ORAL at 02:09

## 2022-09-14 RX ADMIN — FAMOTIDINE 20 MG: 20 TABLET ORAL at 10:09

## 2022-09-14 RX ADMIN — OXYCODONE HYDROCHLORIDE 10 MG: 10 TABLET ORAL at 10:09

## 2022-09-14 RX ADMIN — ONDANSETRON 4 MG: 2 INJECTION INTRAMUSCULAR; INTRAVENOUS at 06:09

## 2022-09-14 RX ADMIN — ACETAMINOPHEN 1000 MG: 325 TABLET ORAL at 05:09

## 2022-09-14 RX ADMIN — ACETAMINOPHEN 1000 MG: 325 TABLET ORAL at 03:09

## 2022-09-14 RX ADMIN — LIDOCAINE 1 PATCH: 50 PATCH CUTANEOUS at 02:09

## 2022-09-14 RX ADMIN — POLYETHYLENE GLYCOL 3350 17 G: 17 POWDER, FOR SOLUTION ORAL at 10:09

## 2022-09-14 RX ADMIN — MUPIROCIN 1 G: 20 OINTMENT TOPICAL at 09:09

## 2022-09-14 RX ADMIN — SODIUM CHLORIDE, SODIUM LACTATE, POTASSIUM CHLORIDE, AND CALCIUM CHLORIDE 500 ML: .6; .31; .03; .02 INJECTION, SOLUTION INTRAVENOUS at 12:09

## 2022-09-14 RX ADMIN — METHOCARBAMOL 500 MG: 500 TABLET ORAL at 09:09

## 2022-09-14 RX ADMIN — METOPROLOL TARTRATE 12.5 MG: 25 TABLET, FILM COATED ORAL at 10:09

## 2022-09-14 RX ADMIN — OXYCODONE HYDROCHLORIDE 10 MG: 10 TABLET ORAL at 02:09

## 2022-09-14 NOTE — PLAN OF CARE
Problem: Physical Therapy  Goal: Physical Therapy Goal  Description: Goals to be met by: 22     Patient will increase functional independence with mobility by performin. Supine to sit with Stand-by Assistance  2. Sit to stand transfer with Supervision  3. Gait  x 250 feet with Supervision using No Assistive Device.     Outcome: Ongoing, Progressing   Goals remain appropriate 2022

## 2022-09-14 NOTE — PLAN OF CARE
"SICU PLAN OF CARE NOTE    Dx: Aortic regurgitation due to bicuspid aortic valve    Goals of Care:  MAP 60-80    Vital Signs:  /69 (BP Location: Right arm, Patient Position: Lying)   Pulse 105   Temp 98.5 °F (36.9 °C) (Oral)   Resp (!) 24   Ht 6' 4" (1.93 m)   Wt 110 kg (242 lb 8.1 oz)   SpO2 (!) 89%   BMI 29.52 kg/m²     Cardiac:  NSR    Resp:  SpO2 93% on room air    Neuro:  AAO x4, Follows Commands, and Moves All Extremities    Gtts:  Heparin    Urine Output:  Urinary Catheter 30-75 cc/hr    Drains:  Chest Tube, total output 5-30 cc /  hour    Diet:  Clear Liquid     Labs/Accuchecks:  accucheks q4h    Skin:  All skin remains free from injury.  Patient turned Q2, waffle mattress inflated, ICU bed working correctly.    Shift Events: See flowsheet for further assessment/details. Family updated on current condition/plan of care, questions answered, and emotional support provided.   MD updated on current condition, vitals, labs, and gtts.  No new orders received, will continue to monitor.    "

## 2022-09-14 NOTE — SUBJECTIVE & OBJECTIVE
"Interval HPI:   Overnight events: Remains in SICU. POD 2. BG trending down and IV insulin infusion stopped at 2214 per protocol. Diet clear liquid [facility]; Fluid - 1500mL  Eatin%  Nausea: No  Hypoglycemia and intervention: No  Fever: No  TPN and/or TF: No  If yes, type of TF/TPN and rate: n/a    /62 (BP Location: Right arm, Patient Position: Sitting)   Pulse 84   Temp 98.2 °F (36.8 °C) (Oral)   Resp 14   Ht 6' 4" (1.93 m)   Wt 110 kg (242 lb 8.1 oz)   SpO2 (!) 94%   BMI 29.52 kg/m²     Labs Reviewed and Include    Recent Labs   Lab 22  022   *   CALCIUM 7.6*   ALBUMIN 3.4*   PROT 5.3*   *   K 4.1   CO2 23      BUN 12   CREATININE 0.9   ALKPHOS 28*   ALT 11   AST 40   BILITOT 0.8     Lab Results   Component Value Date    WBC 16.63 (H) 2022    HGB 7.7 (L) 2022    HCT 23.8 (L) 2022    MCV 75 (L) 2022     2022     No results for input(s): TSH, FREET4 in the last 168 hours.  Lab Results   Component Value Date    HGBA1C 5.4 2022       Nutritional status:   Body mass index is 29.52 kg/m².  Lab Results   Component Value Date    ALBUMIN 3.4 (L) 2022    ALBUMIN 3.2 (L) 2022    ALBUMIN 2.5 (L) 2022     No results found for: PREALBUMIN    Estimated Creatinine Clearance: 140.9 mL/min (based on SCr of 0.9 mg/dL).    Accu-Checks  Recent Labs     22  0501 22  0604 22  0700 22  0808 22  0956 22  1234 22  1736 22  2212 22  0219 22  0344   POCTGLUCOSE 116* 114* 124* 142* 121* 157* 226* 108 122* 105       Current Medications and/or Treatments Impacting Glycemic Control  Immunotherapy:    Immunosuppressants       None          Steroids:   Hormones (From admission, onward)      None          Pressors:    Autonomic Drugs (From admission, onward)      Start     Stop Route Frequency Ordered    22 0900  metoprolol tartrate (LOPRESSOR) split tablet 12.5 mg         " -- Oral 2 times daily 09/14/22 0724          Hyperglycemia/Diabetes Medications:   Antihyperglycemics (From admission, onward)      Start     Stop Route Frequency Ordered    09/14/22 1019  insulin aspart U-100 pen 1-10 Units         -- SubQ Before meals & nightly PRN 09/14/22 0928

## 2022-09-14 NOTE — PROGRESS NOTES
[facility]  Critical Care - Surgery  Progress Note    Patient Name: Alden Ledbetter  MRN: [id number]  Admission Date: 9/12/2022  Hospital Length of Stay: 2 days  Code Status: Full Code  Attending Provider: Thomas Boudreaux MD  Primary Care Provider: Warner Macedo MD   Principal Problem: Aortic regurgitation due to bicuspid aortic valve    Subjective:     Hospital/ICU Course:  Alden Ledbetter is a 47 y.o. male with PMHx of HTN, HLD, and GERD with a bicuspid aortic valve and moderate aortic regurgitation in addition to an enlarging thoracic aortic aneurysm (5.4 x 5.3cm on last CT) with a dilated aortic root who was admitted to SICU following a Bentall procedure with Dr. Boudreaux on 09/13. He was extubated shortly after arrival which he tolerated well. He was started on clevidipine which was subsequently weaned off with addition of home antihypertensive medication.     Interval History/Significant Events:   No acute events or significant changes overnight.  Weaned to room air, maintaining SpO2 >95%.  Chest tube output significantly improved yesterday evening, 185cc/12hr  Working well with PT/OT, ambulating in fritz this AM.    Follow-up For: Procedure(s) (LRB):  BENTALL PROCEDURE (N/A)    Post-Operative Day: 2 Days Post-Op    Objective:     Vital Signs (Most Recent):  Temp: 98.5 °F (36.9 °C) (09/14/22 0302)  Pulse: 94 (09/14/22 0500)  Resp: (!) 23 (09/14/22 0500)  BP: 129/63 (09/14/22 0500)  SpO2: (!) 91 % (09/14/22 0500)   Vital Signs (24h Range):  Temp:  [97.6 °F (36.4 °C)-98.7 °F (37.1 °C)] 98.5 °F (36.9 °C)  Pulse:  [] 94  Resp:  [12-28] 23  SpO2:  [88 %-100 %] 91 %  BP: ()/(50-79) 129/63  Arterial Line BP: ()/(43-78) 119/55     Weight: 110 kg (242 lb 8.1 oz)  Body mass index is 29.52 kg/m².      Intake/Output Summary (Last 24 hours) at 9/14/2022 0611  Last data filed at 9/14/2022 0500  Gross per 24 hour   Intake 1040.51 ml   Output 2455 ml   Net  -1414.49 ml       Physical Exam  Vitals and nursing note reviewed.   Constitutional:       General: He is not in acute distress.     Appearance: Normal appearance.   Cardiovascular:      Rate and Rhythm: Regular rhythm. Tachycardia present.      Pulses: Normal pulses.   Pulmonary:      Effort: Pulmonary effort is normal. No respiratory distress.   Abdominal:      General: Abdomen is flat.      Palpations: Abdomen is soft.   Genitourinary:     Comments: Yanez  Skin:     General: Skin is warm and dry.   Neurological:      General: No focal deficit present.      Mental Status: He is alert.       Lines/Drains/Airways       Central Venous Catheter Line  Duration             Percutaneous Central Line Insertion/Assessment - Triple Lumen  09/12/22 0911 right internal jugular 1 day              Drain  Duration                  Urethral Catheter 09/12/22 0754 Non-latex;Straight-tip;Temperature probe 14 Fr. 1 day         Y Chest Tube 1 and 2 09/12/22 1249 1 Right Mediastinal 19 Fr. 2 Left Mediastinal 19 Fr. 1 day              Arterial Line  Duration             Arterial Line 09/12/22 0710 Left Other (Comment) 1 day              Line  Duration                  Pacer Wires 09/12/22 1210 1 day              Peripheral Intravenous Line  Duration                  Peripheral IV - Single Lumen 09/12/22 0620 18 G Left Antecubital 1 day         Peripheral IV - Single Lumen 09/12/22 0800 18 G Anterior;Proximal;Right Forearm 1 day                    Significant Labs:    CBC/Anemia Profile:  Recent Labs   Lab 09/13/22  1229 09/13/22 1913 09/14/22  0220   WBC 20.65* 16.41* 16.63*   HGB 9.2* 8.1* 7.7*   HCT 27.2* 24.3* 23.8*    165 177   MCV 74* 74* 75*   RDW 18.7* 18.7* 18.5*        Chemistries:  Recent Labs   Lab 09/12/22  1401 09/12/22  1919 09/13/22  0313 09/13/22  0805 09/13/22 1913 09/14/22  0220     --  132*  --   --  133*   K 3.6   < > 4.2 4.0 4.7 4.1     --  104  --   --  103   CO2 21*  --  23  --   --  23   BUN  10  --  10  --   --  12   CREATININE 0.8  --  0.7  --   --  0.9   CALCIUM 7.2*  --  7.4*  --   --  7.6*   ALBUMIN 2.5*  --  3.2*  --   --  3.4*   PROT 4.5*  --  5.1*  --   --  5.3*   BILITOT 0.7  --  0.9  --   --  0.8   ALKPHOS 34*  --  34*  --   --  28*   ALT 15  --  19  --   --  11   AST 43*  --  61*  --   --  40   MG 2.0  2.0  --  1.8  1.8  --   --  2.1   PHOS 2.8  2.8  --  2.6*  2.6*  --   --  1.5*    < > = values in this interval not displayed.       Coagulation:   Recent Labs   Lab 09/14/22  0220   INR 1.3*   APTT 31.5     All pertinent labs within the past 24 hours have been reviewed.    Significant Imaging:  I have reviewed all pertinent imaging results/findings within the past 24 hours.    Assessment/Plan:     * Aortic regurgitation due to bicuspid aortic valve    Neuro/Psych:     - Sedation: N/A    - Pain:    - Scheduled Tylenol, methocarbamol   - PRN oxycodone, hydromorphone             Cardiac:     - POD #2 s/p Bentall procedure    - BP Goals: MAP >65, SBP <120    - Pressors: N/A    - Anti-HTNs: Continue home amlodipine 5mg daily    - Rhythm: NSR in 100s this AM    - Beta blocker: Will continue low-dose metoprolol (12.5mg) this AM and titrate up as tolerated    - Statin: Continue home atorvastatin 40mg      Pulmonary:     - Extubated shortly after admission to SICU    - Maintaining adequate oxygenation on room air    - Goal SpO2 >92%    - Chest Tubes x 2 (2 Meds) with 185cc output in last 12h      Renal:    - Trend BUN/Cr     - Record strict Is/Os, discontinue Yanez today    - UOP 1265cc/24H    Recent Labs   Lab 09/12/22  1401 09/13/22  0313 09/14/22  0220   BUN 10 10 12   CREATININE 0.8 0.7 0.9         FEN / GI:     - Replace electrolytes as needed    - Nutrition: Cardiac diet    - Bowel Regimen: docusate      ID:     - Tmax: 98.4F    - WBC: 16.6, post-op leukocytosis downtrending    - Abx: Complete perioperative cefazolin 2g Q8H x 5 doses    Recent Labs   Lab 09/13/22  1229 09/13/22  7475  09/14/22  0220   WBC 20.65* 16.41* 16.63*         Heme/Onc:     - Hgb 7.7, will continue to monitor    - CBC daily    - ASA 325mg daily    - Will need anticoagulation due to mechanical bioprosthesis    - Heparin bridge to therapeutic warfarin    - Will titrate heparin gtt today to goal aPTT 60-80    - INR trending up, will give additional warfarin 5mg today    Recent Labs   Lab 09/13/22  0313 09/13/22  0659 09/13/22  1229 09/13/22  1913 09/13/22 2025 09/14/22  0220   HGB 11.1*  11.1*   < > 9.2* 8.1*  --  7.7*     178   < > 188 165  --  177   APTT 25.4  25.4  --   --  30.4  --  31.5   INR 1.2  1.2  --   --   --  1.3* 1.3*    < > = values in this interval not displayed.       Endocrine:     - CTS Goal -140    - HgbA1c: 5.4%    - Endocrinology consult for insulin management      PPx:   Feeding: Cardiac diet  Analgesia/Sedation: Tylenol, Methocarbamol, PRN oxycodone, hydromorphone  Thromboembolic Prevention: SCDs  HOB >30: Yes  Stress Ulcer: N/A  Glucose Control: Yes, insulin management per Endocrinology     Lines/Drains/Airway:  PIV x 2: 18G L AC x 1d, 18G R FA x 1d   Art Line: L Brachial x 2d   Chest Tubes: 2 Meds x 1d   Pacing Wires: Ventricular pacing wires      Dispo/Code Status/Palliative:     - Continue SICU Care, will consider step down to floor this afternoon    - Full Code        Yogesh Landis MD, PGY-3  Anesthesiology  Critical Care - Surgery  [facility]

## 2022-09-14 NOTE — ASSESSMENT & PLAN NOTE
BG goal 110-140 (CTS protocol)   Diet clear liquid [facility]; Fluid - 1500mL    Discontinue Transition IV insulin infusion at 0.5 u/hr with stepdown parameters (titrated off last night)   Continue Moderate Dose Correction Scale  BG monitoring ac/hs    ** Please call Endocrine for any BG related issues **    Discharge planning: TBD

## 2022-09-14 NOTE — ASSESSMENT & PLAN NOTE
  Neuro/Psych:     - Sedation: N/A    - Pain:    - Scheduled Tylenol, methocarbamol   - PRN oxycodone, hydromorphone             Cardiac:     - POD #2 s/p Bentall procedure    - BP Goals: MAP >65, SBP <120    - Pressors: N/A    - Anti-HTNs: Continue home amlodipine 5mg daily    - Rhythm: NSR in 100s this AM    - Beta blocker: Will continue low-dose metoprolol (12.5mg) this AM and titrate up as tolerated    - Statin: Continue home atorvastatin 40mg      Pulmonary:     - Extubated shortly after admission to SICU    - Maintaining adequate oxygenation on room air    - Goal SpO2 >92%    - Chest Tubes x 2 (2 Meds) with 185cc output in last 12h      Renal:    - Trend BUN/Cr     - Record strict Is/Os, discontinue Yanez today    - UOP 1265cc/24H    Recent Labs   Lab 09/12/22  1401 09/13/22 0313 09/14/22  0220   BUN 10 10 12   CREATININE 0.8 0.7 0.9         FEN / GI:     - Replace electrolytes as needed    - Nutrition: Cardiac diet    - Bowel Regimen: docusate      ID:     - Tmax: 98.4F    - WBC: 16.6, post-op leukocytosis downtrending    - Abx: Complete perioperative cefazolin 2g Q8H x 5 doses    Recent Labs   Lab 09/13/22  1229 09/13/22 1913 09/14/22  0220   WBC 20.65* 16.41* 16.63*         Heme/Onc:     - Hgb 7.7, will continue to monitor    - CBC daily    - ASA 325mg daily    - Will need anticoagulation due to mechanical bioprosthesis    - Heparin bridge to therapeutic warfarin    - Will titrate heparin gtt today to goal aPTT 60-80    - INR trending up, will give additional warfarin 5mg today    Recent Labs   Lab 09/13/22  0313 09/13/22  0659 09/13/22  1229 09/13/22 1913 09/13/22 2025 09/14/22  0220   HGB 11.1*  11.1*   < > 9.2* 8.1*  --  7.7*     178   < > 188 165  --  177   APTT 25.4  25.4  --   --  30.4  --  31.5   INR 1.2  1.2  --   --   --  1.3* 1.3*    < > = values in this interval not displayed.       Endocrine:     - CTS Goal -140    - HgbA1c: 5.4%    - Endocrinology consult for  insulin management      PPx:   Feeding: Cardiac diet  Analgesia/Sedation: Tylenol, Methocarbamol, PRN oxycodone, hydromorphone  Thromboembolic Prevention: SCDs  HOB >30: Yes  Stress Ulcer: N/A  Glucose Control: Yes, insulin management per Endocrinology     Lines/Drains/Airway:  PIV x 2: 18G L AC x 1d, 18G R FA x 1d   Art Line: L Brachial x 2d   Chest Tubes: 2 Meds x 1d   Pacing Wires: Ventricular pacing wires      Dispo/Code Status/Palliative:     - Continue SICU Care, will consider step down to floor this afternoon    - Full Code

## 2022-09-14 NOTE — PT/OT/SLP PROGRESS
Physical Therapy Treatment    Patient Name:  Alden Ledbetter   MRN:  [id number]    Recommendations:     Discharge Recommendations:  home   Discharge Equipment Recommendations: none   Barriers to discharge: None    Assessment:     Alden Ledbetter is a 47 y.o. male admitted with a medical diagnosis of Aortic regurgitation due to bicuspid aortic valve.  He presents with the following impairments/functional limitations:  impaired endurance, impaired functional mobility, gait instability, pain Pt tolerated treatment well as he was able to gait train for 250 ft with one standing rest break due to dizziness with CGA, a mask and nurse. Pt will continue to benefit from skilled PT 5x/ week in order to increase functional mobility. Pt should be discharged to home when medically stable.    Rehab Prognosis: Good; patient would benefit from acute skilled PT services to address these deficits and reach maximum level of function.    Recent Surgery: Procedure(s) (LRB):  BENTALL PROCEDURE (N/A) 2 Days Post-Op    Plan:     During this hospitalization, patient to be seen 5 x/week to address the identified rehab impairments via gait training, therapeutic activities and progress toward the following goals:    Plan of Care Expires:  10/11/22    Subjective     Chief Complaint: chest hurting him  Patient/Family Comments/goals: to get pt home  Pain/Comfort:  Pain Rating 1: 7/10 (chest)  Pain Addressed 1: Distraction  Pain Rating Post-Intervention 1: 7/10      Objective:     Communicated with nurse prior to session.  Patient found up in chair with arterial line, blood pressure cuff, chest tube, monteiro catheter, peripheral IV, pulse ox (continuous) upon PT entry to room.     General Precautions: Standard, fall, sternal   Orthopedic Precautions:    Braces:    Respiratory Status: Room air     Functional Mobility:  Transfers: Pt was verbally cued on sternal precautions.    Sit to Stand:  stand by assistance with no AD    Gait: Pt gait  trained for 250 ft with one standing break due to dizziness. Pt had CGA, with mask, nurse present and had portable monitor, and chair following.    Balance: standing balance SBA  Sit to Supine min A with help at legs.      AM-PAC 6 CLICK MOBILITY  Turning over in bed (including adjusting bedclothes, sheets and blankets)?: 3  Sitting down on and standing up from a chair with arms (e.g., wheelchair, bedside commode, etc.): 3  Moving from lying on back to sitting on the side of the bed?: 2  Moving to and from a bed to a chair (including a wheelchair)?: 3  Need to walk in hospital room?: 3  Climbing 3-5 steps with a railing?: 1  Basic Mobility Total Score: 15       Therapeutic Activities and Exercises:   Pt was verbally educated on POC. Pt expressed verbal understanding.    Patient left supine in bed with all lines intact, call button in reach, nurse notified, and wife present..    GOALS:   Multidisciplinary Problems       Physical Therapy Goals          Problem: Physical Therapy    Goal Priority Disciplines Outcome Goal Variances Interventions   Physical Therapy Goal     PT, PT/OT Ongoing, Progressing     Description: Goals to be met by: 22     Patient will increase functional independence with mobility by performin. Supine to sit with Stand-by Assistance  2. Sit to stand transfer with Supervision  3. Gait  x 250 feet with Supervision using No Assistive Device.                          Time Tracking:     PT Received On: 22  PT Start Time: 1012     PT Stop Time: 1028  PT Total Time (min): 16 min     Billable Minutes: Gait Training 16 minutes    Treatment Type: Treatment  PT/PTA: PT     PTA Visit Number: 0     2022

## 2022-09-14 NOTE — SUBJECTIVE & OBJECTIVE
Interval History/Significant Events:   No acute events or significant changes overnight.  Weaned to room air, maintaining SpO2 >95%.  Chest tube output significantly improved yesterday evening, 185cc/12hr  Working well with PT/OT, ambulating in fritz this AM.    Follow-up For: Procedure(s) (LRB):  BENTALL PROCEDURE (N/A)    Post-Operative Day: 2 Days Post-Op    Objective:     Vital Signs (Most Recent):  Temp: 98.5 °F (36.9 °C) (09/14/22 0302)  Pulse: 94 (09/14/22 0500)  Resp: (!) 23 (09/14/22 0500)  BP: 129/63 (09/14/22 0500)  SpO2: (!) 91 % (09/14/22 0500)   Vital Signs (24h Range):  Temp:  [97.6 °F (36.4 °C)-98.7 °F (37.1 °C)] 98.5 °F (36.9 °C)  Pulse:  [] 94  Resp:  [12-28] 23  SpO2:  [88 %-100 %] 91 %  BP: ()/(50-79) 129/63  Arterial Line BP: ()/(43-78) 119/55     Weight: 110 kg (242 lb 8.1 oz)  Body mass index is 29.52 kg/m².      Intake/Output Summary (Last 24 hours) at 9/14/2022 0611  Last data filed at 9/14/2022 0500  Gross per 24 hour   Intake 1040.51 ml   Output 2455 ml   Net -1414.49 ml       Physical Exam  Vitals and nursing note reviewed.   Constitutional:       General: He is not in acute distress.     Appearance: Normal appearance.   Cardiovascular:      Rate and Rhythm: Regular rhythm. Tachycardia present.      Pulses: Normal pulses.   Pulmonary:      Effort: Pulmonary effort is normal. No respiratory distress.   Abdominal:      General: Abdomen is flat.      Palpations: Abdomen is soft.   Genitourinary:     Comments: Yanez  Skin:     General: Skin is warm and dry.   Neurological:      General: No focal deficit present.      Mental Status: He is alert.       Lines/Drains/Airways       Central Venous Catheter Line  Duration             Percutaneous Central Line Insertion/Assessment - Triple Lumen  09/12/22 0911 right internal jugular 1 day              Drain  Duration                  Urethral Catheter 09/12/22 0754 Non-latex;Straight-tip;Temperature probe 14 Fr. 1 day         Y Chest  Tube 1 and 2 09/12/22 1249 1 Right Mediastinal 19 Fr. 2 Left Mediastinal 19 Fr. 1 day              Arterial Line  Duration             Arterial Line 09/12/22 0710 Left Other (Comment) 1 day              Line  Duration                  Pacer Wires 09/12/22 1210 1 day              Peripheral Intravenous Line  Duration                  Peripheral IV - Single Lumen 09/12/22 0620 18 G Left Antecubital 1 day         Peripheral IV - Single Lumen 09/12/22 0800 18 G Anterior;Proximal;Right Forearm 1 day                    Significant Labs:    CBC/Anemia Profile:  Recent Labs   Lab 09/13/22  1229 09/13/22 1913 09/14/22 0220   WBC 20.65* 16.41* 16.63*   HGB 9.2* 8.1* 7.7*   HCT 27.2* 24.3* 23.8*    165 177   MCV 74* 74* 75*   RDW 18.7* 18.7* 18.5*        Chemistries:  Recent Labs   Lab 09/12/22  1401 09/12/22 1919 09/13/22  0313 09/13/22  0805 09/13/22 1913 09/14/22 0220     --  132*  --   --  133*   K 3.6   < > 4.2 4.0 4.7 4.1     --  104  --   --  103   CO2 21*  --  23  --   --  23   BUN 10  --  10  --   --  12   CREATININE 0.8  --  0.7  --   --  0.9   CALCIUM 7.2*  --  7.4*  --   --  7.6*   ALBUMIN 2.5*  --  3.2*  --   --  3.4*   PROT 4.5*  --  5.1*  --   --  5.3*   BILITOT 0.7  --  0.9  --   --  0.8   ALKPHOS 34*  --  34*  --   --  28*   ALT 15  --  19  --   --  11   AST 43*  --  61*  --   --  40   MG 2.0  2.0  --  1.8  1.8  --   --  2.1   PHOS 2.8  2.8  --  2.6*  2.6*  --   --  1.5*    < > = values in this interval not displayed.       Coagulation:   Recent Labs   Lab 09/14/22  0220   INR 1.3*   APTT 31.5     All pertinent labs within the past 24 hours have been reviewed.    Significant Imaging:  I have reviewed all pertinent imaging results/findings within the past 24 hours.

## 2022-09-14 NOTE — PROGRESS NOTES
"[facility] - Surgical Intensive Care  Endocrinology  Progress Note    Admit Date: 2022     Reason for Consult: Management of Hyperglycemia     Surgical Procedure and Date:   22  BENTALL PROCEDURE (N/A)      HPI:   Patient is a 47 y.o. male with a diagnosis of GERD, HLD, HTN, bicuspid AV with moderate AR, and 5.4 x 5.3cm thoracic aortic aneurysm. Patient now presents for planned hemiarch replacement, aortic root replacement, and mechanical valve. Endocrinology consulted for management of hyperglycemia.     Lab Results   Component Value Date    HGBA1C 5.4 2022           Interval HPI:   Overnight events: Remains in SICU. POD 2. BG trending down and IV insulin infusion stopped at 2214 per protocol. Diet clear liquid [facility]; Fluid - 1500mL  Eatin%  Nausea: No  Hypoglycemia and intervention: No  Fever: No  TPN and/or TF: No  If yes, type of TF/TPN and rate: n/a    /62 (BP Location: Right arm, Patient Position: Sitting)   Pulse 84   Temp 98.2 °F (36.8 °C) (Oral)   Resp 14   Ht 6' 4" (1.93 m)   Wt 110 kg (242 lb 8.1 oz)   SpO2 (!) 94%   BMI 29.52 kg/m²     Labs Reviewed and Include    Recent Labs   Lab 22  0220   *   CALCIUM 7.6*   ALBUMIN 3.4*   PROT 5.3*   *   K 4.1   CO2 23      BUN 12   CREATININE 0.9   ALKPHOS 28*   ALT 11   AST 40   BILITOT 0.8     Lab Results   Component Value Date    WBC 16.63 (H) 2022    HGB 7.7 (L) 2022    HCT 23.8 (L) 2022    MCV 75 (L) 2022     2022     No results for input(s): TSH, FREET4 in the last 168 hours.  Lab Results   Component Value Date    HGBA1C 5.4 2022       Nutritional status:   Body mass index is 29.52 kg/m².  Lab Results   Component Value Date    ALBUMIN 3.4 (L) 2022    ALBUMIN 3.2 (L) 2022    ALBUMIN 2.5 (L) 2022     No results found for: PREALBUMIN    Estimated Creatinine Clearance: 140.9 mL/min (based on SCr of 0.9 mg/dL).    Accu-Checks  Recent " Labs     09/13/22  0501 09/13/22  0604 09/13/22  0700 09/13/22  0808 09/13/22  0956 09/13/22  1234 09/13/22  1736 09/13/22  2212 09/14/22  0219 09/14/22  0344   POCTGLUCOSE 116* 114* 124* 142* 121* 157* 226* 108 122* 105       Current Medications and/or Treatments Impacting Glycemic Control  Immunotherapy:    Immunosuppressants       None          Steroids:   Hormones (From admission, onward)      None          Pressors:    Autonomic Drugs (From admission, onward)      Start     Stop Route Frequency Ordered    09/14/22 0900  metoprolol tartrate (LOPRESSOR) split tablet 12.5 mg         -- Oral 2 times daily 09/14/22 0724          Hyperglycemia/Diabetes Medications:   Antihyperglycemics (From admission, onward)      Start     Stop Route Frequency Ordered    09/14/22 1019  insulin aspart U-100 pen 1-10 Units         -- SubQ Before meals & nightly PRN 09/14/22 0919            ASSESSMENT and PLAN    * Aortic regurgitation due to bicuspid aortic valve  Managed per primary team  Optimize BG control        Stress hyperglycemia  BG goal 110-140 (CTS protocol)   Diet clear liquid [facility]; Fluid - 1500mL    Discontinue Transition IV insulin infusion at 0.5 u/hr with stepdown parameters (titrated off last night)   Continue Moderate Dose Correction Scale  BG monitoring ac/hs    ** Please call Endocrine for any BG related issues **    Discharge planning: TBD        Mixed hyperlipidemia  May increase insulin resistance.             Dora Johnson NP  Endocrinology  [facility] - Surgical Intensive Care

## 2022-09-14 NOTE — NURSING
Pt H/H now 8.1/24.3 down from 9.2/27.2.  notified; MD updated on current labs, coags, and CT output. The L mediastinal CT is noted to have brighter red output than right; MD aware. Verbal orders to start the ordered heparin gtt and continue hourly CT output monitoring. Hemodynamics remain stable. Will continue to monitor.

## 2022-09-15 PROBLEM — D62 ACUTE BLOOD LOSS ANEMIA: Status: ACTIVE | Noted: 2022-09-15

## 2022-09-15 PROBLEM — E83.39 HYPOPHOSPHATEMIA: Status: ACTIVE | Noted: 2022-09-15

## 2022-09-15 PROBLEM — Z98.890 H/O AORTIC ROOT REPAIR: Status: ACTIVE | Noted: 2022-09-15

## 2022-09-15 PROBLEM — Z95.2 S/P AVR (AORTIC VALVE REPLACEMENT): Status: ACTIVE | Noted: 2022-09-15

## 2022-09-15 LAB
ALBUMIN SERPL BCP-MCNC: 3.1 G/DL (ref 3.5–5.2)
ALP SERPL-CCNC: 41 U/L (ref 55–135)
ALT SERPL W/O P-5'-P-CCNC: 11 U/L (ref 10–44)
ANION GAP SERPL CALC-SCNC: 4 MMOL/L (ref 8–16)
APTT BLDCRRT: 43.4 SEC (ref 21–32)
APTT BLDCRRT: 43.8 SEC (ref 21–32)
AST SERPL-CCNC: 29 U/L (ref 10–40)
BASOPHILS # BLD AUTO: 0.02 K/UL (ref 0–0.2)
BASOPHILS NFR BLD: 0.2 % (ref 0–1.9)
BILIRUB SERPL-MCNC: 0.6 MG/DL (ref 0.1–1)
BLD PROD TYP BPU: NORMAL
BLOOD UNIT EXPIRATION DATE: NORMAL
BLOOD UNIT TYPE CODE: 5100
BLOOD UNIT TYPE: NORMAL
BUN SERPL-MCNC: 10 MG/DL (ref 6–20)
CALCIUM SERPL-MCNC: 8.2 MG/DL (ref 8.7–10.5)
CHLORIDE SERPL-SCNC: 101 MMOL/L (ref 95–110)
CO2 SERPL-SCNC: 29 MMOL/L (ref 23–29)
CODING SYSTEM: NORMAL
CREAT SERPL-MCNC: 0.9 MG/DL (ref 0.5–1.4)
DIFFERENTIAL METHOD: ABNORMAL
DISPENSE STATUS: NORMAL
EOSINOPHIL # BLD AUTO: 0 K/UL (ref 0–0.5)
EOSINOPHIL NFR BLD: 0.2 % (ref 0–8)
ERYTHROCYTE [DISTWIDTH] IN BLOOD BY AUTOMATED COUNT: 19.1 % (ref 11.5–14.5)
EST. GFR  (NO RACE VARIABLE): >60 ML/MIN/1.73 M^2
GLUCOSE SERPL-MCNC: 103 MG/DL (ref 70–110)
HCT VFR BLD AUTO: 20.5 % (ref 40–54)
HGB BLD-MCNC: 6.7 G/DL (ref 14–18)
IMM GRANULOCYTES # BLD AUTO: 0.06 K/UL (ref 0–0.04)
IMM GRANULOCYTES NFR BLD AUTO: 0.5 % (ref 0–0.5)
INR PPP: 1.5 (ref 0.8–1.2)
LYMPHOCYTES # BLD AUTO: 1.8 K/UL (ref 1–4.8)
LYMPHOCYTES NFR BLD: 13.8 % (ref 18–48)
MAGNESIUM SERPL-MCNC: 2 MG/DL (ref 1.6–2.6)
MCH RBC QN AUTO: 24.7 PG (ref 27–31)
MCHC RBC AUTO-ENTMCNC: 32.7 G/DL (ref 32–36)
MCV RBC AUTO: 76 FL (ref 82–98)
MONOCYTES # BLD AUTO: 1.3 K/UL (ref 0.3–1)
MONOCYTES NFR BLD: 9.9 % (ref 4–15)
NEUTROPHILS # BLD AUTO: 9.9 K/UL (ref 1.8–7.7)
NEUTROPHILS NFR BLD: 75.4 % (ref 38–73)
NRBC BLD-RTO: 0 /100 WBC
PHOSPHATE SERPL-MCNC: 1.4 MG/DL (ref 2.7–4.5)
PLATELET # BLD AUTO: 195 K/UL (ref 150–450)
PMV BLD AUTO: 9.6 FL (ref 9.2–12.9)
POCT GLUCOSE: 111 MG/DL (ref 70–110)
POCT GLUCOSE: 117 MG/DL (ref 70–110)
POTASSIUM SERPL-SCNC: 3.8 MMOL/L (ref 3.5–5.1)
PROT SERPL-MCNC: 5.7 G/DL (ref 6–8.4)
PROTHROMBIN TIME: 15.6 SEC (ref 9–12.5)
RBC # BLD AUTO: 2.71 M/UL (ref 4.6–6.2)
SODIUM SERPL-SCNC: 134 MMOL/L (ref 136–145)
TRANS ERYTHROCYTES VOL PATIENT: NORMAL ML
WBC # BLD AUTO: 13.08 K/UL (ref 3.9–12.7)

## 2022-09-15 PROCEDURE — 80053 COMPREHEN METABOLIC PANEL: CPT | Performed by: STUDENT IN AN ORGANIZED HEALTH CARE EDUCATION/TRAINING PROGRAM

## 2022-09-15 PROCEDURE — 94761 N-INVAS EAR/PLS OXIMETRY MLT: CPT

## 2022-09-15 PROCEDURE — 85730 THROMBOPLASTIN TIME PARTIAL: CPT | Performed by: PHYSICIAN ASSISTANT

## 2022-09-15 PROCEDURE — 25000003 PHARM REV CODE 250: Performed by: STUDENT IN AN ORGANIZED HEALTH CARE EDUCATION/TRAINING PROGRAM

## 2022-09-15 PROCEDURE — 25000003 PHARM REV CODE 250: Performed by: INTERNAL MEDICINE

## 2022-09-15 PROCEDURE — 63600175 PHARM REV CODE 636 W HCPCS

## 2022-09-15 PROCEDURE — 85610 PROTHROMBIN TIME: CPT | Performed by: PHYSICIAN ASSISTANT

## 2022-09-15 PROCEDURE — 63600175 PHARM REV CODE 636 W HCPCS: Performed by: PHYSICIAN ASSISTANT

## 2022-09-15 PROCEDURE — 97535 SELF CARE MNGMENT TRAINING: CPT

## 2022-09-15 PROCEDURE — 36415 COLL VENOUS BLD VENIPUNCTURE: CPT | Performed by: THORACIC SURGERY (CARDIOTHORACIC VASCULAR SURGERY)

## 2022-09-15 PROCEDURE — 83735 ASSAY OF MAGNESIUM: CPT | Performed by: PHYSICIAN ASSISTANT

## 2022-09-15 PROCEDURE — 25000003 PHARM REV CODE 250: Performed by: PHYSICIAN ASSISTANT

## 2022-09-15 PROCEDURE — 97110 THERAPEUTIC EXERCISES: CPT

## 2022-09-15 PROCEDURE — 97116 GAIT TRAINING THERAPY: CPT

## 2022-09-15 PROCEDURE — 84100 ASSAY OF PHOSPHORUS: CPT | Performed by: PHYSICIAN ASSISTANT

## 2022-09-15 PROCEDURE — 20600001 HC STEP DOWN PRIVATE ROOM

## 2022-09-15 PROCEDURE — 85730 THROMBOPLASTIN TIME PARTIAL: CPT | Mod: 91 | Performed by: THORACIC SURGERY (CARDIOTHORACIC VASCULAR SURGERY)

## 2022-09-15 PROCEDURE — 85025 COMPLETE CBC W/AUTO DIFF WBC: CPT | Performed by: PHYSICIAN ASSISTANT

## 2022-09-15 PROCEDURE — 36415 COLL VENOUS BLD VENIPUNCTURE: CPT | Performed by: PHYSICIAN ASSISTANT

## 2022-09-15 PROCEDURE — 99900035 HC TECH TIME PER 15 MIN (STAT)

## 2022-09-15 PROCEDURE — 25000003 PHARM REV CODE 250

## 2022-09-15 PROCEDURE — 63600175 PHARM REV CODE 636 W HCPCS: Performed by: INTERNAL MEDICINE

## 2022-09-15 RX ORDER — POTASSIUM CHLORIDE 20 MEQ/1
20 TABLET, EXTENDED RELEASE ORAL 2 TIMES DAILY
Status: DISCONTINUED | OUTPATIENT
Start: 2022-09-15 | End: 2022-09-16 | Stop reason: HOSPADM

## 2022-09-15 RX ORDER — FUROSEMIDE 20 MG/1
20 TABLET ORAL 2 TIMES DAILY
Status: DISCONTINUED | OUTPATIENT
Start: 2022-09-15 | End: 2022-09-16 | Stop reason: HOSPADM

## 2022-09-15 RX ORDER — WARFARIN SODIUM 5 MG/1
5 TABLET ORAL DAILY
Status: DISCONTINUED | OUTPATIENT
Start: 2022-09-15 | End: 2022-09-16 | Stop reason: HOSPADM

## 2022-09-15 RX ORDER — HEPARIN SODIUM 10000 [USP'U]/100ML
1400 INJECTION, SOLUTION INTRAVENOUS CONTINUOUS
Status: DISCONTINUED | OUTPATIENT
Start: 2022-09-15 | End: 2022-09-15

## 2022-09-15 RX ORDER — HEPARIN SODIUM 10000 [USP'U]/100ML
1600 INJECTION, SOLUTION INTRAVENOUS CONTINUOUS
Status: DISCONTINUED | OUTPATIENT
Start: 2022-09-15 | End: 2022-09-15

## 2022-09-15 RX ORDER — AMLODIPINE BESYLATE 10 MG/1
10 TABLET ORAL DAILY
Status: DISCONTINUED | OUTPATIENT
Start: 2022-09-15 | End: 2022-09-16 | Stop reason: HOSPADM

## 2022-09-15 RX ORDER — METOPROLOL TARTRATE 25 MG/1
25 TABLET, FILM COATED ORAL 2 TIMES DAILY
Status: DISCONTINUED | OUTPATIENT
Start: 2022-09-15 | End: 2022-09-16

## 2022-09-15 RX ORDER — HEPARIN SODIUM 10000 [USP'U]/100ML
1800 INJECTION, SOLUTION INTRAVENOUS CONTINUOUS
Status: DISCONTINUED | OUTPATIENT
Start: 2022-09-15 | End: 2022-09-16

## 2022-09-15 RX ADMIN — METOPROLOL TARTRATE 25 MG: 25 TABLET, FILM COATED ORAL at 09:09

## 2022-09-15 RX ADMIN — FAMOTIDINE 20 MG: 20 TABLET ORAL at 09:09

## 2022-09-15 RX ADMIN — ASPIRIN 81 MG: 81 TABLET, COATED ORAL at 09:09

## 2022-09-15 RX ADMIN — HEPARIN SODIUM 1400 UNITS/HR: 5000 INJECTION INTRAVENOUS; SUBCUTANEOUS at 03:09

## 2022-09-15 RX ADMIN — METHOCARBAMOL 500 MG: 500 TABLET ORAL at 01:09

## 2022-09-15 RX ADMIN — METHOCARBAMOL 500 MG: 500 TABLET ORAL at 09:09

## 2022-09-15 RX ADMIN — DOCUSATE SODIUM 100 MG: 100 CAPSULE ORAL at 09:09

## 2022-09-15 RX ADMIN — HEPARIN SODIUM 1800 UNITS/HR: 5000 INJECTION INTRAVENOUS; SUBCUTANEOUS at 05:09

## 2022-09-15 RX ADMIN — DIBASIC SODIUM PHOSPHATE, MONOBASIC POTASSIUM PHOSPHATE AND MONOBASIC SODIUM PHOSPHATE 2 TABLET: 852; 155; 130 TABLET ORAL at 09:09

## 2022-09-15 RX ADMIN — ACETAMINOPHEN 1000 MG: 325 TABLET ORAL at 06:09

## 2022-09-15 RX ADMIN — MUPIROCIN 1 G: 20 OINTMENT TOPICAL at 09:09

## 2022-09-15 RX ADMIN — METHOCARBAMOL 500 MG: 500 TABLET ORAL at 04:09

## 2022-09-15 RX ADMIN — POTASSIUM CHLORIDE 20 MEQ: 1500 TABLET, EXTENDED RELEASE ORAL at 09:09

## 2022-09-15 RX ADMIN — ESCITALOPRAM OXALATE 5 MG: 5 TABLET, FILM COATED ORAL at 09:09

## 2022-09-15 RX ADMIN — ACETAMINOPHEN 1000 MG: 325 TABLET ORAL at 01:09

## 2022-09-15 RX ADMIN — HYDROMORPHONE HYDROCHLORIDE 0.5 MG: 1 INJECTION, SOLUTION INTRAMUSCULAR; INTRAVENOUS; SUBCUTANEOUS at 03:09

## 2022-09-15 RX ADMIN — WARFARIN SODIUM 5 MG: 5 TABLET ORAL at 04:09

## 2022-09-15 RX ADMIN — ATORVASTATIN CALCIUM 40 MG: 20 TABLET, FILM COATED ORAL at 09:09

## 2022-09-15 RX ADMIN — OXYCODONE HYDROCHLORIDE 10 MG: 10 TABLET ORAL at 09:09

## 2022-09-15 RX ADMIN — AMLODIPINE BESYLATE 10 MG: 10 TABLET ORAL at 09:09

## 2022-09-15 RX ADMIN — FUROSEMIDE 20 MG: 20 TABLET ORAL at 09:09

## 2022-09-15 RX ADMIN — ACETAMINOPHEN 1000 MG: 325 TABLET ORAL at 11:09

## 2022-09-15 RX ADMIN — FUROSEMIDE 20 MG: 20 TABLET ORAL at 04:09

## 2022-09-15 NOTE — PLAN OF CARE
Pt educated on fall risk overnight,pt remained free from falls/trauma/injury. Mid sternal incision pain managed with Oxycodone and IV dilaudid. BG monitored. Continuous heparin infusing. Chest tube with 10 cc serosanguinous output this shift.Chest tube site dsg saturated, changed the  dsg maintaining sterile technique. Plan of care reviewed with pt, all questions answered. Bed locked in lowest position, call bell within reach, no acute distress noted, will continue to monitor.

## 2022-09-15 NOTE — PLAN OF CARE
Pt free from falls and injury this shift. All VSS at this time. Pain controlled with prn and scheduled medication, intermittent nausea related to pain medication. Sats >92% on RA. MAPs maintained 60-80. Short period of Afib noted this AM, converted after 5mg Metorpolol IVP. Continuous heparin infusing. Abdomen rounded,nondistended, no BM this shift. Belching. Midsternal incision dsg intact, some dried drainage noted. Chest tubes with  55cc serosanguinous output this shift. Yanez intact, uop >1.5L. OOB to chair all shift, worked well with PT/OT. No other significant events this shift. POC reviewed with patient and wife, all questions answered

## 2022-09-15 NOTE — ASSESSMENT & PLAN NOTE
- Bradycardic prior to surgery   - Today HR in lows 100s  - Will increase BB to 25 and see how he tolerates

## 2022-09-15 NOTE — NURSING
Vishnu Walker MD notified about aPTT 37.7. No nomogram heparin gtts rate changed to 1400 units/hr from 1000 units/hr per MD order. Next aPTT to be drawn @ 6: 30 am. Will continue to monitor.

## 2022-09-15 NOTE — PT/OT/SLP PROGRESS
"Physical Therapy  Treatment    Patient Name:  Alden Ledbetter   MRN:  [id number]    Recommendations:     Discharge Recommendations:  home   Discharge Equipment Recommendations: none   Barriers to discharge: decreased functional mobility and fall risk    Assessment:     Alden Ledbetter is a 47 y.o. male admitted with a medical diagnosis of Aortic regurgitation due to bicuspid aortic valve.  Pt demonstrates the below listed impairments with decreased tolerance to functional mobility, impaired endurance, and pain being the most limiting.  Pt demonstrates improved tolerance to out of bed mobility and is willing to ambulate out in the hallway.  He recalls 3/3 sternal precautions and is 100% compliant.  Pt requires skilled PT due to patient's status as: a fall risk and patient has increased pain to allow safe d/c home.     Impairments and functional limitations:  impaired endurance, impaired functional mobility, gait instability, decreased upper extremity function, pain, impaired cardiopulmonary response to activity.  These deficits affect their roles and responsibilities in which they were able to complete prior to admit.  Rehab Prognosis:   Good ; patient would benefit from acute skilled PT services 5 x/week to address these deficits, improve quality of life, focus on recovery of impairments, provide patient/caregiver education, reduce fall risk, and reach maximum level of function.  Pt is highly  motivated to participated in skilled PT.    Recent Surgery:   Procedure(s) (LRB):  BENTALL PROCEDURE (N/A) 3 Days Post-Op    Plan:     During this hospitalization, patient to be seen 5 x/week to address the identified rehab impairments via gait training, therapeutic activities, therapeutic exercises, neuromuscular re-education and progress toward the following goals:    Plan of Care Expires:  10/11/22    Subjective     Chief Complaint: "I should be out of here on saturday"  Patient/Family Comments/Goals: Return " home  Pain/Comfort:  Pain Rating 1: 4/10  Location - Orientation 1: generalized  Location 1: sternal  Pain Addressed 1: Reposition, Distraction  Pain Rating Post-Intervention 1: other (see comments) (not rated)    Objective:     Communicated with RN prior to session.  Patient found up in chair with telemetry, chest tube, peripheral IV upon PT entry to room.     General Precautions: Standard, fall, sternal   Orthopedic Precautions:N/A   Braces: N/A  Oxygen Device:      Functional Mobility:  Bed Mobility:  Seated in chair at start of session and returned to chair  Head of bed position: n/a    Transfers:  Sit to Stand: SBA with No AD and with cues for hand placement    Gait: Patient ambulated 400' with No AD and CGA. Patient demonstrates occasional unsteady gait, decreased step length, decreased arm swing, flexed posture, and decreased allen. All lines remained intact throughout ambulation trial, mask donned for out of room ambulation, chair follow for patient safety.  Requires x1 standing rest break    Balance:   Position Score Time   Static Sitting GOOD-: Takes MODERATE challenges but inconstantly  n/a   Dynamic Sitting GOOD-: Maintains balance through MODERATE excursions of active trunk motion, but inconstantly  n/a   Static Standing GOOD-: Takes MODERATE challenges but inconstantly  n/a   Dynamic Standing FAIR: Maintains without assist, but is unable to take any challenges n/a       AM-PAC 6 CLICK MOBILITY  Turning over in bed (including adjusting bedclothes, sheets and blankets)?: 3  Sitting down on and standing up from a chair with arms (e.g., wheelchair, bedside commode, etc.): 3  Moving from lying on back to sitting on the side of the bed?: 2  Moving to and from a bed to a chair (including a wheelchair)?: 3  Need to walk in hospital room?: 3  Climbing 3-5 steps with a railing?: 2  Basic Mobility Total Score: 16     Therapeutic Activities:  Patient educated on role of acute care PT and PT POC, safety while in  hospital including calling nurse for mobility, call light usage, benefits of out of bed mobility, home exercise program , breathing technique, fall risk, transfers, gait technique, positioning, posture, risks of prolonged bed rest, possible discharge disposition , sternal precautions, and benefits of continued PT by explanation and demonstration.    Patient demonstrates good understanding of education provided this day.   Whiteboard updated    Therapeutic Exercises:  Patient participated in: Ankle Pumps, LAQs, and Hip marches with 1 sets, 5 reps with cues for technique and HEP intensity.  Patient participated in: education for walking program     Patient left up in chair with all lines intact, call button in reach, and family present.    GOALS:   Multidisciplinary Problems       Physical Therapy Goals          Problem: Physical Therapy    Goal Priority Disciplines Outcome Goal Variances Interventions   Physical Therapy Goal     PT, PT/OT Ongoing, Progressing     Description: Goals to be met by: 22     Patient will increase functional independence with mobility by performin. Supine to sit with Stand-by Assistance  2. Sit to stand transfer with Supervision  3. Gait  x 250 feet with Supervision using No Assistive Device.                          Time Tracking:     PT Received On: 09/15/22  PT Start Time: 1036     PT Stop Time: 1059  PT Total Time (min): 23 min     Billable Minutes: Gait Training 15 and Therapeutic Exercise 8    Treatment Type: Treatment  PT/PTA: PT     PTA Visit Number: 0     09/15/2022

## 2022-09-15 NOTE — PLAN OF CARE
Problem: Adult Inpatient Plan of Care  Goal: Plan of Care Review  Outcome: Ongoing, Progressing  Goal: Patient-Specific Goal (Individualized)  Outcome: Ongoing, Progressing  Goal: Absence of Hospital-Acquired Illness or Injury  Outcome: Ongoing, Progressing  Goal: Optimal Comfort and Wellbeing  Outcome: Ongoing, Progressing  Goal: Readiness for Transition of Care  Outcome: Ongoing, Progressing     Problem: Activity Intolerance (Cardiovascular Surgery)  Goal: Improved Activity Tolerance  Outcome: Ongoing, Progressing     Problem: Adjustment to Surgery (Cardiovascular Surgery)  Goal: Optimal Coping with Heart Surgery  Outcome: Ongoing, Progressing     Problem: Bleeding (Cardiovascular Surgery)  Goal: Bleeding (Cardiovascular Surgery)  Outcome: Ongoing, Progressing     Problem: Bowel Motility Impaired (Cardiovascular Surgery)  Goal: Effective Bowel Elimination (Cardiovascular Surgery)  Outcome: Ongoing, Progressing     Problem: Cardiac Function Impaired (Cardiovascular Surgery)  Goal: Effective Cardiac Function  Outcome: Ongoing, Progressing     Problem: Cerebral Tissue Perfusion (Cardiovascular Surgery)  Goal: Optimal Cerebral Tissue Perfusion (Cardiovascular Surgery)  Outcome: Ongoing, Progressing     Problem: Fluid and Electrolyte Imbalance (Cardiovascular Surgery)  Goal: Fluid and Electrolyte Balance (Cardiovascular Surgery)  Outcome: Ongoing, Progressing     Problem: Glycemic Control Impaired (Cardiovascular Surgery)  Goal: Blood Glucose Level Within Targeted Range (Cardiovascular Surgery)  Outcome: Ongoing, Progressing     Problem: Infection (Cardiovascular Surgery)  Goal: Absence of Infection Signs and Symptoms  Outcome: Ongoing, Progressing     Problem: Ongoing Anesthesia Effects (Cardiovascular Surgery)  Goal: Anesthesia/Sedation Recovery  Outcome: Ongoing, Progressing     Problem: Pain (Cardiovascular Surgery)  Goal: Acceptable Pain Control  Outcome: Ongoing, Progressing     Problem: Postoperative Nausea  and Vomiting (Cardiovascular Surgery)  Goal: Nausea and Vomiting Relief (Cardiovascular Surgery)  Outcome: Ongoing, Progressing     Problem: Postoperative Urinary Retention (Cardiovascular Surgery)  Goal: Effective Urinary Elimination (Cardiovascular Surgery)  Outcome: Ongoing, Progressing     Problem: Respiratory Compromise (Cardiovascular Surgery)  Goal: Effective Oxygenation and Ventilation (Cardiovascular Surgery)  Outcome: Ongoing, Progressing     Problem: Infection  Goal: Absence of Infection Signs and Symptoms  Outcome: Ongoing, Progressing     Problem: Communication Impairment (Mechanical Ventilation, Invasive)  Goal: Effective Communication  Outcome: Ongoing, Progressing     Problem: Device-Related Complication Risk (Mechanical Ventilation, Invasive)  Goal: Optimal Device Function  Outcome: Ongoing, Progressing     Problem: Inability to Wean (Mechanical Ventilation, Invasive)  Goal: Mechanical Ventilation Liberation  Outcome: Ongoing, Progressing     Problem: Nutrition Impairment (Mechanical Ventilation, Invasive)  Goal: Optimal Nutrition Delivery  Outcome: Ongoing, Progressing     Problem: Skin and Tissue Injury (Mechanical Ventilation, Invasive)  Goal: Absence of Device-Related Skin and Tissue Injury  Outcome: Ongoing, Progressing     Problem: Ventilator-Induced Lung Injury (Mechanical Ventilation, Invasive)  Goal: Absence of Ventilator-Induced Lung Injury  Outcome: Ongoing, Progressing     Problem: Communication Impairment (Artificial Airway)  Goal: Effective Communication  Outcome: Ongoing, Progressing     Problem: Device-Related Complication Risk (Artificial Airway)  Goal: Optimal Device Function  Outcome: Ongoing, Progressing     Problem: Skin and Tissue Injury (Artificial Airway)  Goal: Absence of Device-Related Skin or Tissue Injury  Outcome: Ongoing, Progressing     Problem: Fall Injury Risk  Goal: Absence of Fall and Fall-Related Injury  Outcome: Ongoing, Progressing     Problem: Restraint,  Nonbehavioral (Nonviolent)  Goal: Absence of Harm or Injury  Outcome: Ongoing, Progressing     Problem: Skin Injury Risk Increased  Goal: Skin Health and Integrity  Outcome: Ongoing, Progressing

## 2022-09-15 NOTE — PT/OT/SLP PROGRESS
Occupational Therapy   Treatment/Discharge    Name: Alden Ledbetter  MRN: [id number]  Admitting Diagnosis:  Aortic regurgitation due to bicuspid aortic valve  3 Days Post-Op    Recommendations:     Discharge Recommendations: home  Discharge Equipment Recommendations:  none  Barriers to discharge:  None    Assessment:     Alden Ledbetter is a 47 y.o. male with a medical diagnosis of Aortic regurgitation due to bicuspid aortic valve.  He presents with performing ADL safely and without A. No further OT services warranted.     Rehab Prognosis:  Good; patient is no longer in need of skilled OT services        Plan:     Patient to be d/c'd from acute OT services  Plan of Care Expires: 10/13/22  Plan of Care Reviewed with: patient    Subjective     Pain/Comfort:  Pain Rating 1: 0/10  Pain Rating Post-Intervention 1: 0/10    Objective:     Communicated with: RN prior to session.  Patient found supine with telemetry, chest tube upon OT entry to room.    General Precautions: Standard, fall, sternal   Orthopedic Precautions:N/A   Braces:    Respiratory Status: Room air     Occupational Performance:     Bed Mobility:    Patient completed Scooting/Bridging with supervision  Patient completed Supine to Sit with supervision     Functional Mobility/Transfers:  Patient completed Sit <> Stand Transfer with independence  with  no assistive device   Patient completed Toilet Transfer Step Transfer and STS technique with modified independence with  no AD  Functional Mobility: Mod Independent to/from bathroom and around room during ADL    Activities of Daily Living:  Grooming: independence    Upper Body Dressing: modified independence    Lower Body Dressing: modified independence    Toileting: modified independence        [facility] 6 Click ADL: 24    Treatment & Education:  Pt ed on OT POC  Pt recalled 3/3 sternal precautions  Pt ed on continued OOB and ADL in room     Patient left up in chair with all lines intact, call button in  reach, and RN notified    GOALS:   Multidisciplinary Problems       Occupational Therapy Goals       Not on file              Multidisciplinary Problems (Resolved)          Problem: Occupational Therapy    Goal Priority Disciplines Outcome Interventions   Occupational Therapy Goal   (Resolved)     OT, PT/OT Met    Description: Goals to be met by: 9/27/22     Patient will increase functional independence with ADLs by performing:    Feeding with [place].  UE Dressing with [place].  LE Dressing with [place].  Grooming while standing at sink with [place].  Toileting from toilet with [place] for hygiene and clothing management.   Toilet transfer to toilet with [place].  Pt will engage in functional mobility to simulate household distances in order to maximize functional activity tolerance required for engagement in occupations of choice with supervision.                         Time Tracking:     OT Date of Treatment: 09/15/22  OT Start Time: 0758  OT Stop Time: 0821  OT Total Time (min): 23 min    Billable Minutes:Self Care/Home Management 23               9/15/2022

## 2022-09-15 NOTE — ASSESSMENT & PLAN NOTE
- mechanical valve   - INR goal 2-5. Today is 1.5 (3,5). Will give another 5mg tonight   - PTT goal 60-80. Heparin gtt increased to 1600  - Metop increased   - lasix and K added  - Phos replaced   - Norvasc increased   - Chest tubes to suction   - pacer wires removed   - bowel regimen   - pepcid   - scheduled tylenol   - PRN narcotics   - PT/OT  - Yanez out

## 2022-09-15 NOTE — CARE UPDATE
BG goal 140 -180   Diet Cardiac Fluid - 1500mL  3 Days Post-Op    BG stable and below goal without use of insulin therapy this AM since IV insulin discontinued. Endocrinology will continue to follow, and manage glycemic control while inpatient.     - Continue Moderate Dose SQ Insulin Correction Scale PRN hypreglycemia.   - BG Monitoring AC/HS     ** Please call Endocrine for any BG related issues **  ** Please notify Endocrine for any change and/or advance in diet**    Lab Results   Component Value Date    HGBA1C 5.4 09/08/2022       Discharge Planning:   TBD. Please notify endocrinology prior to discharge. Will most likely sign off if patient continues to not have insulin requirements as diet continues to advance.

## 2022-09-15 NOTE — PROGRESS NOTES
[facility] - Cardiology Stepdown  Cardiothoracic Surgery  Progress Note    Patient Name: Alden Ledbetter  MRN: [id number]  Admission Date: 9/12/2022  Hospital Length of Stay: 3 days  Code Status: Full Code   Attending Physician: Thomas Boudreaux MD   Referring Provider: Thomas Boudreaux MD  Principal Problem:Aortic regurgitation due to bicuspid aortic valve      Subjective:     Post-Op Info:  Procedure(s) (LRB):  BENTALL PROCEDURE (N/A)   3 Days Post-Op     Interval History: Stepped down yesterday. Will leave chest tubes for another day. Pacer wires removed. Ambulating well. H/H dropped but will hold off on transfusion for now.     Review of Systems   Constitutional: Positive for malaise/fatigue.   Cardiovascular:  Negative for chest pain and leg swelling.   Respiratory:  Negative for shortness of breath.    Genitourinary:  Negative for dysuria.   Neurological:  Negative for headaches and weakness.   Medications:  Continuous Infusions:   heparin (porcine) in 5 % dex 1,600 Units/hr (09/15/22 0907)     Scheduled Meds:   acetaminophen  1,000 mg Oral Q8H    amLODIPine  10 mg Oral Daily    aspirin  81 mg Oral Daily    atorvastatin  40 mg Oral Daily    docusate sodium  100 mg Oral BID    EScitalopram oxalate  5 mg Oral Daily    famotidine  20 mg Oral BID    furosemide  20 mg Oral BID    k phos di & mono-sod phos mono  2 tablet Oral BID    LIDOcaine  2 patch Transdermal Q24H    methocarbamoL  500 mg Oral QID    metoprolol tartrate  25 mg Oral BID    mupirocin  1 g Nasal BID    polyethylene glycol  17 g Oral Daily    potassium chloride  20 mEq Oral BID    warfarin  5 mg Oral Daily     PRN Meds:bisacodyL, dextrose 10%, dextrose 10%, glucagon (human recombinant), glucose, glucose, insulin aspart U-100, metoclopramide HCl, ondansetron, oxyCODONE, oxyCODONE, sodium chloride 0.9%     Objective:     Vital Signs (Most Recent):  Temp: 98.2 °F (36.8 °C) (09/15/22 0746)  Pulse: (!) 123 (09/15/22 0812)  Resp: 18 (09/15/22  0746)  BP: (!) 146/78 (09/15/22 0746)  SpO2: (!) 92 % (09/15/22 0746)   Vital Signs (24h Range):  Temp:  [97.8 °F (36.6 °C)-98.4 °F (36.9 °C)] 98.2 °F (36.8 °C)  Pulse:  [] 123  Resp:  [14-30] 18  SpO2:  [90 %-100 %] 92 %  BP: (116-158)/(58-78) 146/78  Arterial Line BP: ()/(37-68) 115/54     Weight: 110 kg (242 lb 8.1 oz)  Body mass index is 29.52 kg/m².    SpO2: (!) 92 %  O2 Device (Oxygen Therapy): room air    Intake/Output - Last 3 Shifts         09/13 0700  09/14 0659 09/14 0700  09/15 0659 09/15 0700  09/16 0659    P.O.  400     I.V. (mL/kg) 844.8 (7.7) 76.2 (0.7)     Blood       IV Piggyback 199.7 500     Total Intake(mL/kg) 1044.5 (9.5) 976.2 (8.9)     Urine (mL/kg/hr) 1195 (0.5) 2500 (0.9)     Chest Tube 1345 75 10    Total Output 2540 2575 10    Net -1495.5 -1598.8 -10                   Lines/Drains/Airways       Drain  Duration                  Y Chest Tube 1 and 2 09/12/22 1249 1 Right Mediastinal 19 Fr. 2 Left Mediastinal 19 Fr. 2 days              Line  Duration                  Pacer Wires 09/12/22 1210 2 days              Peripheral Intravenous Line  Duration                  Peripheral IV - Single Lumen 09/12/22 0620 18 G Left Antecubital 3 days         Peripheral IV - Single Lumen 09/12/22 0800 18 G Anterior;Proximal;Right Forearm 3 days                    Physical Exam  Constitutional:       General: He is not in acute distress.  HENT:      Head: Normocephalic and atraumatic.   Eyes:      Pupils: Pupils are equal, round, and reactive to light.   Cardiovascular:      Rate and Rhythm: Regular rhythm. Tachycardia present.      Comments: Chest tubes in place   Pulmonary:      Effort: Pulmonary effort is normal. No respiratory distress.   Abdominal:      General: There is no distension.   Musculoskeletal:         General: Normal range of motion.      Cervical back: Normal range of motion.   Skin:     Coloration: Skin is not pale.      Comments: Midline sternal incision c/d/i   Neurological:       Mental Status: He is alert. Mental status is at baseline.   Psychiatric:         Mood and Affect: Mood normal.         Behavior: Behavior normal.       Significant Labs:  BMP:   Recent Labs   Lab 09/15/22  0638      *   K 3.8      CO2 29   BUN 10   CREATININE 0.9   CALCIUM 8.2*   MG 2.0     Cardiac markers: No results for input(s): CKMB, CPKMB, TROPONINT, TROPONINI, MYOGLOBIN in the last 48 hours.  CBC:   Recent Labs   Lab 09/15/22  0638   WBC 13.08*   RBC 2.71*   HGB 6.7*   HCT 20.5*      MCV 76*   MCH 24.7*   MCHC 32.7     CMP:   Recent Labs   Lab 09/15/22  0638      CALCIUM 8.2*   ALBUMIN 3.1*   PROT 5.7*   *   K 3.8   CO2 29      BUN 10   CREATININE 0.9   ALKPHOS 41*   ALT 11   AST 29   BILITOT 0.6     Coagulation:   Recent Labs   Lab 09/15/22  0638   INR 1.5*   APTT 43.8*       Significant Diagnostics:  I have reviewed all pertinent imaging results/findings within the past 24 hours.    Assessment/Plan:     Hypophosphatemia  - Replacing PO   - Daily lab     Acute blood loss anemia  - Expected post operatively   - CBC daily   - H/H 6.7/20.5 today. Hold off on transfusion for now    S/P AVR (aortic valve replacement)  - mechanical valve   - INR goal 2-5. Today is 1.5 (3,5). Will give another 5mg tonight   - PTT goal 60-80. Heparin gtt increased to 1600  - Metop increased   - lasix and K added  - Phos replaced   - Norvasc increased   - Chest tubes to suction   - pacer wires removed   - bowel regimen   - pepcid   - scheduled tylenol   - PRN narcotics   - PT/OT  - Yanez out     H/O aortic root repair  - See s/p AVR     Stress hyperglycemia  - Endocrine following     Regular sinus bradycardia  - Bradycardic prior to surgery   - Today HR in lows 100s  - Will increase BB to 25 and see how he tolerates     Thoracic aortic aneurysm (TAA)  - S/P aortic root replacement     Mixed hyperlipidemia  - Statin     Essential hypertension  - Norvasc increased       Dispo: CSU. D/C  pending chest tube removal and therapeutic INR     Amanda Baptiste PA-C  Cardiothoracic Surgery  [facility] - Cardiology Stepdown

## 2022-09-15 NOTE — SUBJECTIVE & OBJECTIVE
Interval History: Stepped down yesterday. Will leave chest tubes for another day. Pacer wires removed. Ambulating well. H/H dropped but will hold off on transfusion for now.     Review of Systems   Constitutional: Positive for malaise/fatigue.   Cardiovascular:  Negative for chest pain and leg swelling.   Respiratory:  Negative for shortness of breath.    Genitourinary:  Negative for dysuria.   Neurological:  Negative for headaches and weakness.   Medications:  Continuous Infusions:   heparin (porcine) in 5 % dex 1,600 Units/hr (09/15/22 0907)     Scheduled Meds:   acetaminophen  1,000 mg Oral Q8H    amLODIPine  10 mg Oral Daily    aspirin  81 mg Oral Daily    atorvastatin  40 mg Oral Daily    docusate sodium  100 mg Oral BID    EScitalopram oxalate  5 mg Oral Daily    famotidine  20 mg Oral BID    furosemide  20 mg Oral BID    k phos di & mono-sod phos mono  2 tablet Oral BID    LIDOcaine  2 patch Transdermal Q24H    methocarbamoL  500 mg Oral QID    metoprolol tartrate  25 mg Oral BID    mupirocin  1 g Nasal BID    polyethylene glycol  17 g Oral Daily    potassium chloride  20 mEq Oral BID    warfarin  5 mg Oral Daily     PRN Meds:bisacodyL, dextrose 10%, dextrose 10%, glucagon (human recombinant), glucose, glucose, insulin aspart U-100, metoclopramide HCl, ondansetron, oxyCODONE, oxyCODONE, sodium chloride 0.9%     Objective:     Vital Signs (Most Recent):  Temp: 98.2 °F (36.8 °C) (09/15/22 0746)  Pulse: (!) 123 (09/15/22 0812)  Resp: 18 (09/15/22 0746)  BP: (!) 146/78 (09/15/22 0746)  SpO2: (!) 92 % (09/15/22 0746)   Vital Signs (24h Range):  Temp:  [97.8 °F (36.6 °C)-98.4 °F (36.9 °C)] 98.2 °F (36.8 °C)  Pulse:  [] 123  Resp:  [14-30] 18  SpO2:  [90 %-100 %] 92 %  BP: (116-158)/(58-78) 146/78  Arterial Line BP: ()/(37-68) 115/54     Weight: 110 kg (242 lb 8.1 oz)  Body mass index is 29.52 kg/m².    SpO2: (!) 92 %  O2 Device (Oxygen Therapy): room air    Intake/Output - Last 3 Shifts          09/13 0700  09/14 0659 09/14 0700  09/15 0659 09/15 0700  09/16 0659    P.O.  400     I.V. (mL/kg) 844.8 (7.7) 76.2 (0.7)     Blood       IV Piggyback 199.7 500     Total Intake(mL/kg) 1044.5 (9.5) 976.2 (8.9)     Urine (mL/kg/hr) 1195 (0.5) 2500 (0.9)     Chest Tube 1345 75 10    Total Output 2540 2575 10    Net -1495.5 -1598.8 -10                   Lines/Drains/Airways       Drain  Duration                  Y Chest Tube 1 and 2 09/12/22 1249 1 Right Mediastinal 19 Fr. 2 Left Mediastinal 19 Fr. 2 days              Line  Duration                  Pacer Wires 09/12/22 1210 2 days              Peripheral Intravenous Line  Duration                  Peripheral IV - Single Lumen 09/12/22 0620 18 G Left Antecubital 3 days         Peripheral IV - Single Lumen 09/12/22 0800 18 G Anterior;Proximal;Right Forearm 3 days                    Physical Exam  Constitutional:       General: He is not in acute distress.  HENT:      Head: Normocephalic and atraumatic.   Eyes:      Pupils: Pupils are equal, round, and reactive to light.   Cardiovascular:      Rate and Rhythm: Regular rhythm. Tachycardia present.      Comments: Chest tubes in place   Pulmonary:      Effort: Pulmonary effort is normal. No respiratory distress.   Abdominal:      General: There is no distension.   Musculoskeletal:         General: Normal range of motion.      Cervical back: Normal range of motion.   Skin:     Coloration: Skin is not pale.      Comments: Midline sternal incision c/d/i   Neurological:      Mental Status: He is alert. Mental status is at baseline.   Psychiatric:         Mood and Affect: Mood normal.         Behavior: Behavior normal.       Significant Labs:  BMP:   Recent Labs   Lab 09/15/22  0638      *   K 3.8      CO2 29   BUN 10   CREATININE 0.9   CALCIUM 8.2*   MG 2.0     Cardiac markers: No results for input(s): CKMB, CPKMB, TROPONINT, TROPONINI, MYOGLOBIN in the last 48 hours.  CBC:   Recent Labs   Lab  09/15/22  0638   WBC 13.08*   RBC 2.71*   HGB 6.7*   HCT 20.5*      MCV 76*   MCH 24.7*   MCHC 32.7     CMP:   Recent Labs   Lab 09/15/22  0638      CALCIUM 8.2*   ALBUMIN 3.1*   PROT 5.7*   *   K 3.8   CO2 29      BUN 10   CREATININE 0.9   ALKPHOS 41*   ALT 11   AST 29   BILITOT 0.6     Coagulation:   Recent Labs   Lab 09/15/22  0638   INR 1.5*   APTT 43.8*       Significant Diagnostics:  I have reviewed all pertinent imaging results/findings within the past 24 hours.

## 2022-09-15 NOTE — PLAN OF CARE
Problem: Occupational Therapy  Goal: Occupational Therapy Goal  Description: Goals to be met by: 9/27/22     Patient will increase functional independence with ADLs by performing:    Feeding with [place].  UE Dressing with [place].  LE Dressing with [place].  Grooming while standing at sink with [place].  Toileting from toilet with [place] for hygiene and clothing management.   Toilet transfer to toilet with [place].  Pt will engage in functional mobility to simulate household distances in order to maximize functional activity tolerance required for engagement in occupations of choice with supervision.    Outcome: Met

## 2022-09-15 NOTE — NURSING TRANSFER
Nursing Transfer Note      Reason patient is being transferred: stepdown care    Transfer From: SICU    Transfer via wheelchair    Transfer with cardiac monitoring, chest tube and heparin drip    Transported by transport tech and RN    Medicines sent: infusing heparin bag    Any special needs or follow-up needed: cardiac monitoring,chest tubes site care and sternal precautions.    Chart send with patient: Yes    Notified: spouse    Patient reassessed at: 9/14/2022 @ 830 pm.     Upon arrival to floor: cardiac monitor applied, patient oriented to room, call bell in reach, and bed in lowest position, chest tubes connected to wall suction and V/S monitored.

## 2022-09-16 ENCOUNTER — DOCUMENTATION ONLY (OUTPATIENT)
Dept: CARDIOTHORACIC SURGERY | Facility: CLINIC | Age: 47
End: 2022-09-16
Payer: COMMERCIAL

## 2022-09-16 VITALS
SYSTOLIC BLOOD PRESSURE: 132 MMHG | DIASTOLIC BLOOD PRESSURE: 72 MMHG | HEART RATE: 103 BPM | RESPIRATION RATE: 17 BRPM | TEMPERATURE: 98 F | OXYGEN SATURATION: 93 % | WEIGHT: 233.25 LBS | HEIGHT: 76 IN | BODY MASS INDEX: 28.4 KG/M2

## 2022-09-16 LAB
ABO + RH BLD: NORMAL
APTT BLDCRRT: 44.1 SEC (ref 21–32)
APTT BLDCRRT: 47.6 SEC (ref 21–32)
BASOPHILS # BLD AUTO: 0.02 K/UL (ref 0–0.2)
BASOPHILS # BLD AUTO: 0.03 K/UL (ref 0–0.2)
BASOPHILS NFR BLD: 0.2 % (ref 0–1.9)
BASOPHILS NFR BLD: 0.3 % (ref 0–1.9)
BLD GP AB SCN CELLS X3 SERPL QL: NORMAL
BLD PROD TYP BPU: NORMAL
BLOOD UNIT EXPIRATION DATE: NORMAL
BLOOD UNIT TYPE CODE: 5100
BLOOD UNIT TYPE: NORMAL
CODING SYSTEM: NORMAL
DIFFERENTIAL METHOD: ABNORMAL
DIFFERENTIAL METHOD: ABNORMAL
DISPENSE STATUS: NORMAL
EOSINOPHIL # BLD AUTO: 0.1 K/UL (ref 0–0.5)
EOSINOPHIL # BLD AUTO: 0.1 K/UL (ref 0–0.5)
EOSINOPHIL NFR BLD: 0.4 % (ref 0–8)
EOSINOPHIL NFR BLD: 0.7 % (ref 0–8)
ERYTHROCYTE [DISTWIDTH] IN BLOOD BY AUTOMATED COUNT: 19.2 % (ref 11.5–14.5)
ERYTHROCYTE [DISTWIDTH] IN BLOOD BY AUTOMATED COUNT: 19.9 % (ref 11.5–14.5)
HCT VFR BLD AUTO: 20.1 % (ref 40–54)
HCT VFR BLD AUTO: 22.8 % (ref 40–54)
HGB BLD-MCNC: 6.6 G/DL (ref 14–18)
HGB BLD-MCNC: 7.6 G/DL (ref 14–18)
IMM GRANULOCYTES # BLD AUTO: 0.05 K/UL (ref 0–0.04)
IMM GRANULOCYTES # BLD AUTO: 0.08 K/UL (ref 0–0.04)
IMM GRANULOCYTES NFR BLD AUTO: 0.5 % (ref 0–0.5)
IMM GRANULOCYTES NFR BLD AUTO: 0.7 % (ref 0–0.5)
INR PPP: 2 (ref 0.8–1.2)
LYMPHOCYTES # BLD AUTO: 1.7 K/UL (ref 1–4.8)
LYMPHOCYTES # BLD AUTO: 1.7 K/UL (ref 1–4.8)
LYMPHOCYTES NFR BLD: 14.2 % (ref 18–48)
LYMPHOCYTES NFR BLD: 16 % (ref 18–48)
MAGNESIUM SERPL-MCNC: 2 MG/DL (ref 1.6–2.6)
MCH RBC QN AUTO: 24.7 PG (ref 27–31)
MCH RBC QN AUTO: 24.8 PG (ref 27–31)
MCHC RBC AUTO-ENTMCNC: 32.8 G/DL (ref 32–36)
MCHC RBC AUTO-ENTMCNC: 33.3 G/DL (ref 32–36)
MCV RBC AUTO: 74 FL (ref 82–98)
MCV RBC AUTO: 76 FL (ref 82–98)
MONOCYTES # BLD AUTO: 0.9 K/UL (ref 0.3–1)
MONOCYTES # BLD AUTO: 1.3 K/UL (ref 0.3–1)
MONOCYTES NFR BLD: 12.9 % (ref 4–15)
MONOCYTES NFR BLD: 8.1 % (ref 4–15)
NEUTROPHILS # BLD AUTO: 7.2 K/UL (ref 1.8–7.7)
NEUTROPHILS # BLD AUTO: 8.9 K/UL (ref 1.8–7.7)
NEUTROPHILS NFR BLD: 69.6 % (ref 38–73)
NEUTROPHILS NFR BLD: 76.4 % (ref 38–73)
NRBC BLD-RTO: 1 /100 WBC
NRBC BLD-RTO: 1 /100 WBC
NUM UNITS TRANS PACKED RBC: NORMAL
PHOSPHATE SERPL-MCNC: 1.8 MG/DL (ref 2.7–4.5)
PLATELET # BLD AUTO: 253 K/UL (ref 150–450)
PLATELET # BLD AUTO: 265 K/UL (ref 150–450)
PMV BLD AUTO: 8.8 FL (ref 9.2–12.9)
PMV BLD AUTO: 9.4 FL (ref 9.2–12.9)
POTASSIUM SERPL-SCNC: 3.5 MMOL/L (ref 3.5–5.1)
PROTHROMBIN TIME: 20.3 SEC (ref 9–12.5)
RBC # BLD AUTO: 2.66 M/UL (ref 4.6–6.2)
RBC # BLD AUTO: 3.08 M/UL (ref 4.6–6.2)
WBC # BLD AUTO: 10.35 K/UL (ref 3.9–12.7)
WBC # BLD AUTO: 11.65 K/UL (ref 3.9–12.7)

## 2022-09-16 PROCEDURE — 36415 COLL VENOUS BLD VENIPUNCTURE: CPT | Performed by: THORACIC SURGERY (CARDIOTHORACIC VASCULAR SURGERY)

## 2022-09-16 PROCEDURE — 25000003 PHARM REV CODE 250: Performed by: STUDENT IN AN ORGANIZED HEALTH CARE EDUCATION/TRAINING PROGRAM

## 2022-09-16 PROCEDURE — 25000003 PHARM REV CODE 250

## 2022-09-16 PROCEDURE — 84100 ASSAY OF PHOSPHORUS: CPT | Performed by: PHYSICIAN ASSISTANT

## 2022-09-16 PROCEDURE — P9016 RBC LEUKOCYTES REDUCED: HCPCS | Performed by: PHYSICIAN ASSISTANT

## 2022-09-16 PROCEDURE — 36430 TRANSFUSION BLD/BLD COMPNT: CPT

## 2022-09-16 PROCEDURE — 63600175 PHARM REV CODE 636 W HCPCS: Performed by: PHYSICIAN ASSISTANT

## 2022-09-16 PROCEDURE — 25000003 PHARM REV CODE 250: Performed by: PHYSICIAN ASSISTANT

## 2022-09-16 PROCEDURE — 85025 COMPLETE CBC W/AUTO DIFF WBC: CPT | Performed by: THORACIC SURGERY (CARDIOTHORACIC VASCULAR SURGERY)

## 2022-09-16 PROCEDURE — 85025 COMPLETE CBC W/AUTO DIFF WBC: CPT | Mod: 91 | Performed by: PHYSICIAN ASSISTANT

## 2022-09-16 PROCEDURE — 86920 COMPATIBILITY TEST SPIN: CPT | Performed by: PHYSICIAN ASSISTANT

## 2022-09-16 PROCEDURE — 86850 RBC ANTIBODY SCREEN: CPT | Performed by: THORACIC SURGERY (CARDIOTHORACIC VASCULAR SURGERY)

## 2022-09-16 PROCEDURE — 85730 THROMBOPLASTIN TIME PARTIAL: CPT | Performed by: PHYSICIAN ASSISTANT

## 2022-09-16 PROCEDURE — 84132 ASSAY OF SERUM POTASSIUM: CPT | Performed by: PHYSICIAN ASSISTANT

## 2022-09-16 PROCEDURE — 85610 PROTHROMBIN TIME: CPT | Performed by: PHYSICIAN ASSISTANT

## 2022-09-16 PROCEDURE — 83735 ASSAY OF MAGNESIUM: CPT | Performed by: PHYSICIAN ASSISTANT

## 2022-09-16 PROCEDURE — 99900035 HC TECH TIME PER 15 MIN (STAT)

## 2022-09-16 PROCEDURE — 36415 COLL VENOUS BLD VENIPUNCTURE: CPT | Performed by: PHYSICIAN ASSISTANT

## 2022-09-16 RX ORDER — POLYETHYLENE GLYCOL 3350 17 G/17G
17 POWDER, FOR SOLUTION ORAL DAILY
Qty: 5 EACH | Refills: 0 | Status: SHIPPED | OUTPATIENT
Start: 2022-09-17 | End: 2022-09-28

## 2022-09-16 RX ORDER — METOPROLOL TARTRATE 50 MG/1
50 TABLET ORAL 2 TIMES DAILY
Qty: 60 TABLET | Refills: 11 | Status: SHIPPED | OUTPATIENT
Start: 2022-09-16 | End: 2022-10-11

## 2022-09-16 RX ORDER — DOCUSATE SODIUM 100 MG/1
100 CAPSULE, LIQUID FILLED ORAL 2 TIMES DAILY
Qty: 30 CAPSULE | Refills: 0 | Status: SHIPPED | OUTPATIENT
Start: 2022-09-16 | End: 2022-10-11

## 2022-09-16 RX ORDER — FUROSEMIDE 20 MG/1
TABLET ORAL
Qty: 45 TABLET | Refills: 3 | Status: SHIPPED | OUTPATIENT
Start: 2022-09-16 | End: 2022-09-28

## 2022-09-16 RX ORDER — POTASSIUM CHLORIDE 20 MEQ/1
20 TABLET, EXTENDED RELEASE ORAL ONCE
Status: COMPLETED | OUTPATIENT
Start: 2022-09-16 | End: 2022-09-16

## 2022-09-16 RX ORDER — METOPROLOL TARTRATE 50 MG/1
50 TABLET ORAL 2 TIMES DAILY
Status: DISCONTINUED | OUTPATIENT
Start: 2022-09-16 | End: 2022-09-16 | Stop reason: HOSPADM

## 2022-09-16 RX ORDER — POTASSIUM CHLORIDE 20 MEQ/1
20 TABLET, EXTENDED RELEASE ORAL ONCE
Status: DISCONTINUED | OUTPATIENT
Start: 2022-09-16 | End: 2022-09-16

## 2022-09-16 RX ORDER — ACETAMINOPHEN 500 MG
1000 TABLET ORAL EVERY 6 HOURS PRN
Qty: 30 TABLET | Refills: 0 | Status: SHIPPED | OUTPATIENT
Start: 2022-09-16 | End: 2022-10-11

## 2022-09-16 RX ORDER — WARFARIN SODIUM 5 MG/1
5 TABLET ORAL DAILY
Qty: 30 TABLET | Refills: 11 | Status: SHIPPED | OUTPATIENT
Start: 2022-09-16 | End: 2024-03-07

## 2022-09-16 RX ORDER — POTASSIUM CHLORIDE 20 MEQ/1
TABLET, EXTENDED RELEASE ORAL
Qty: 45 TABLET | Refills: 3 | Status: SHIPPED | OUTPATIENT
Start: 2022-09-16 | End: 2022-09-28

## 2022-09-16 RX ORDER — METHOCARBAMOL 500 MG/1
500 TABLET, FILM COATED ORAL 4 TIMES DAILY
Qty: 40 TABLET | Refills: 0 | Status: SHIPPED | OUTPATIENT
Start: 2022-09-16 | End: 2022-09-26

## 2022-09-16 RX ORDER — FERROUS SULFATE 325(65) MG
325 TABLET, DELAYED RELEASE (ENTERIC COATED) ORAL 2 TIMES DAILY
Qty: 60 TABLET | Refills: 0 | Status: SHIPPED | OUTPATIENT
Start: 2022-09-16 | End: 2022-10-11 | Stop reason: SDUPTHER

## 2022-09-16 RX ORDER — HEPARIN SODIUM 10000 [USP'U]/100ML
2000 INJECTION, SOLUTION INTRAVENOUS CONTINUOUS
Status: DISCONTINUED | OUTPATIENT
Start: 2022-09-16 | End: 2022-09-16

## 2022-09-16 RX ORDER — OXYCODONE HYDROCHLORIDE 5 MG/1
5 TABLET ORAL EVERY 4 HOURS PRN
Qty: 42 TABLET | Refills: 0 | Status: SHIPPED | OUTPATIENT
Start: 2022-09-16 | End: 2022-09-23

## 2022-09-16 RX ORDER — ASPIRIN 81 MG/1
81 TABLET ORAL DAILY
Qty: 30 TABLET | Refills: 11 | Status: SHIPPED | OUTPATIENT
Start: 2022-09-17 | End: 2024-03-07

## 2022-09-16 RX ORDER — HYDROCODONE BITARTRATE AND ACETAMINOPHEN 500; 5 MG/1; MG/1
TABLET ORAL
Status: DISCONTINUED | OUTPATIENT
Start: 2022-09-16 | End: 2022-09-16 | Stop reason: HOSPADM

## 2022-09-16 RX ORDER — AMLODIPINE BESYLATE 10 MG/1
10 TABLET ORAL DAILY
Qty: 30 TABLET | Refills: 11 | Status: SHIPPED | OUTPATIENT
Start: 2022-09-17 | End: 2023-02-28

## 2022-09-16 RX ADMIN — HEPARIN SODIUM 1800 UNITS/HR: 5000 INJECTION INTRAVENOUS; SUBCUTANEOUS at 01:09

## 2022-09-16 RX ADMIN — AMLODIPINE BESYLATE 10 MG: 10 TABLET ORAL at 08:09

## 2022-09-16 RX ADMIN — DIBASIC SODIUM PHOSPHATE, MONOBASIC POTASSIUM PHOSPHATE AND MONOBASIC SODIUM PHOSPHATE 2 TABLET: 852; 155; 130 TABLET ORAL at 11:09

## 2022-09-16 RX ADMIN — ASPIRIN 81 MG: 81 TABLET, COATED ORAL at 08:09

## 2022-09-16 RX ADMIN — POTASSIUM CHLORIDE 20 MEQ: 1500 TABLET, EXTENDED RELEASE ORAL at 08:09

## 2022-09-16 RX ADMIN — POTASSIUM CHLORIDE 20 MEQ: 1500 TABLET, EXTENDED RELEASE ORAL at 03:09

## 2022-09-16 RX ADMIN — HEPARIN SODIUM 2000 UNITS/HR: 10000 INJECTION, SOLUTION INTRAVENOUS at 08:09

## 2022-09-16 RX ADMIN — ESCITALOPRAM OXALATE 5 MG: 5 TABLET, FILM COATED ORAL at 08:09

## 2022-09-16 RX ADMIN — ATORVASTATIN CALCIUM 40 MG: 20 TABLET, FILM COATED ORAL at 08:09

## 2022-09-16 RX ADMIN — POLYETHYLENE GLYCOL 3350 17 G: 17 POWDER, FOR SOLUTION ORAL at 08:09

## 2022-09-16 RX ADMIN — OXYCODONE HYDROCHLORIDE 10 MG: 10 TABLET ORAL at 01:09

## 2022-09-16 RX ADMIN — FAMOTIDINE 20 MG: 20 TABLET ORAL at 08:09

## 2022-09-16 RX ADMIN — METHOCARBAMOL 500 MG: 500 TABLET ORAL at 08:09

## 2022-09-16 RX ADMIN — METOPROLOL TARTRATE 25 MG: 25 TABLET, FILM COATED ORAL at 08:09

## 2022-09-16 RX ADMIN — ACETAMINOPHEN 1000 MG: 325 TABLET ORAL at 03:09

## 2022-09-16 RX ADMIN — ACETAMINOPHEN 1000 MG: 325 TABLET ORAL at 08:09

## 2022-09-16 RX ADMIN — DOCUSATE SODIUM 100 MG: 100 CAPSULE ORAL at 08:09

## 2022-09-16 RX ADMIN — FUROSEMIDE 20 MG: 20 TABLET ORAL at 08:09

## 2022-09-16 RX ADMIN — METHOCARBAMOL 500 MG: 500 TABLET ORAL at 03:09

## 2022-09-16 RX ADMIN — MUPIROCIN 1 G: 20 OINTMENT TOPICAL at 08:09

## 2022-09-16 NOTE — NURSING
RICHARD Baptiste gave order for CBC STAT prior to d/c. Discharge not dependent on result, lab just needs to be drawn prior to pt leaving-per RICHARD Baptiste.

## 2022-09-16 NOTE — DISCHARGE SUMMARY
[facility] - Cardiology Stepdown  Cardiothoracic Surgery  Discharge Summary      Patient Name: Alden Ledbetter  MRN: [id number]  Admission Date: 9/12/2022  Hospital Length of Stay: 4 days  Discharge Date and Time:  09/16/2022 11:09 AM  Attending Physician: Thomas Boudreaux MD   Discharging Provider: Amanda Baptiste PA-C  Primary Care Provider: Warner Macedo MD    HPI:   Mr. Ledbetter is a very pleasant 47-year-old gentleman who has been followed for hypertension and a family history of heart disease.  He had an echo which demonstrated a bicuspid aortic valve as well as an ascending aortic aneurysm.  This was done in October of 2020.  He had a follow-up study in July of this year which demonstrated significant interval increase in the size of his ascending aortic aneurysm.  His aneurysmal dimensions were confirmed with a CT scan.  Given the size of his aneurysm, and the change over time, surgical correction was recommended.  We had a long preoperative discussion regarding management of his bicuspid aortic valve.  He elected to proceed with replacement.      Procedure(s) (LRB):  BENTALL PROCEDURE (N/A)        Hospital Course: On 9/12/22, the patient was taken to the Operating Room for the above stated procedure. Please see the previously dictated operative report for complete details. Postoperatively, the patient was taken from the  Operating Room to the ICU where the vital signs were monitored and pain was kept under control. The patient was weaned from the drips and extubated in the ICU per protocol. Once hemodynamically stable, the patient was transferred to the Cardiac Step-Down floor for continued strengthening and ambulation. On postoperative day 4, the patient was ready for discharge to home. Patient was transfused one unit of blood prior to discharge for an H/H of 6.6/20. At the time of discharge, the patient was ambulating unassisted. Pain was well controlled with oral analgesics and the  patient was tolerating the diet.     MOBILITY AND ACTIVITY: As tolerated. Patient may shower. No heavy lifting of greater than 5 pounds and no driving.     DIET: An 1800-calorie ADA with a 1500 mL fluid restriction.     WOUND CARE INSTRUCTIONS: Check for redness, swelling and drainage around the  incision or wound. Patient is to call for any obvious bleeding, drainage, pus from the wound, unusual problems or difficulties or temperature of greater than 101   degrees.     FOLLOWUP: Follow up with Dr. Boudreaux in approximately 3 weeks. Prior to this  appointment, the patient will have a chest x-ray and EKG.     Patient not placed on Ace-Inhibitor at the time of discharge due to potential for hypotension     Dr. Carlton to follow coumadin. INR goal 2-3. At the time of discharge INR was 2.     Patient was bradycardic in 50s at baseline prior to surgery but HR remained elevated to low 100s while inpatient. Will discharge on Metoprolol 50mg BID but discussed with patient to decrease dose if HR drops when he gets home.     DISCHARGE CONDITION: At the time of discharge, the patient was in sinus rhythm and afebrile with stable vital signs.      Goals of Care Treatment Preferences:  Code Status: Full Code      Consults (From admission, onward)          Status Ordering Provider     Consult to Endocrinology  Once        Provider:  (Not yet assigned)    Completed CHAPIN MONACO     Consult Case Management/Social Work  Once        Provider:  (Not yet assigned)    Acknowledged CHAPIN MONAOC            Significant Diagnostic Studies:     Pending Diagnostic Studies:       Procedure Component Value Units Date/Time    APTT [[id number]]     Order Status: Sent Lab Status: No result     Specimen: Blood     Specimen to Pathology, Surgery Cardiovascular [[id number]] Collected: 09/12/22 1240    Order Status: Sent Lab Status: In process Updated: 09/13/22 1054    Specimen: Tissue     X-Ray Chest AP Portable [[id number]] Resulted:  09/16/22 1123    Order Status: Sent Lab Status: In process Updated: 09/16/22 1123            No new Assessment & Plan notes have been filed under this hospital service since the last note was generated.  Service: Cardiothoracic Surgery    Final Active Diagnoses:    Diagnosis Date Noted POA    PRINCIPAL PROBLEM:  Aortic regurgitation due to bicuspid aortic valve [Q23.1] 02/14/2022 Not Applicable    H/O aortic root repair [Z98.890] 09/15/2022 Not Applicable    S/P AVR (aortic valve replacement) [Z95.2] 09/15/2022 Not Applicable    Acute blood loss anemia [D62] 09/15/2022 Unknown    Hypophosphatemia [E83.39] 09/15/2022 Unknown    Stress hyperglycemia [R73.9] 09/12/2022 Unknown    Regular sinus bradycardia [R00.1] 09/08/2022 Yes    Thoracic aortic aneurysm (TAA) [I71.2] 08/09/2022 Yes    Mixed hyperlipidemia [E78.2] 08/20/2021 Yes    Essential hypertension [I10] 08/20/2021 Yes      Problems Resolved During this Admission:      Discharged Condition: stable    Disposition: Home or Self Care    Follow Up:    Patient Instructions:   No discharge procedures on file.  Medications:  Reconciled Home Medications:      Medication List        START taking these medications      acetaminophen 500 MG tablet  Commonly known as: TYLENOL  Take 2 tablets (1,000 mg total) by mouth every 6 (six) hours as needed for Pain.     amLODIPine 10 MG tablet  Commonly known as: NORVASC  Take 1 tablet (10 mg total) by mouth once daily.  Start taking on: September 17, 2022     aspirin 81 MG EC tablet  Commonly known as: ECOTRIN  Take 1 tablet (81 mg total) by mouth once daily.  Start taking on: September 17, 2022     docusate sodium 100 MG capsule  Commonly known as: COLACE  Take 1 capsule (100 mg total) by mouth 2 (two) times daily.     ferrous sulfate 325 (65 FE) MG EC tablet  Take 1 tablet (325 mg total) by mouth 2 (two) times daily.     furosemide 20 MG tablet  Commonly known as: LASIX  Take one tablet by mouth twice daily for 7 days then  "decrease to once daily     k phos di & mono-sod phos mono 250 mg Tab  Commonly known as: K-PHOS-NEUTRAL  Take 2 tablets by mouth 2 (two) times a day. for 5 days     methocarbamoL 500 MG Tab  Commonly known as: ROBAXIN  Take 1 tablet (500 mg total) by mouth 4 (four) times daily. for 10 days     metoprolol tartrate 50 MG tablet  Commonly known as: LOPRESSOR  Take 1 tablet (50 mg total) by mouth 2 (two) times daily.     oxyCODONE 5 MG immediate release tablet  Commonly known as: ROXICODONE  Take 1 tablet (5 mg total) by mouth every 4 (four) hours as needed for Pain.     polyethylene glycol 17 gram Pwpk  Commonly known as: GLYCOLAX  Take 17 g by mouth once daily.  Start taking on: September 17, 2022     potassium chloride SA 20 MEQ tablet  Commonly known as: K-DUR,KLOR-CON  Take one tablet by mouth twice daily for 7 days then decrease to once daily     warfarin 5 MG tablet  Commonly known as: COUMADIN  Take 1 tablet (5 mg total) by mouth Daily.            CHANGE how you take these medications      esomeprazole 40 MG capsule  Commonly known as: NEXIUM  Take 20 mg by mouth before breakfast.  What changed: Another medication with the same name was removed. Continue taking this medication, and follow the directions you see here.            CONTINUE taking these medications      azelastine 137 mcg (0.1 %) nasal spray  Commonly known as: ASTELIN  azelastine 137 mcg (0.1 %) nasal spray aerosol   USE 1 SPRAY NASALLY TWICE DAILY FOR 7 DAYS     cetirizine 10 mg Cap  Zyrtec 10 mg capsule   Take 1 capsule every day by oral route.     EScitalopram oxalate 5 MG Tab  Commonly known as: LEXAPRO  Take 1 tablet (5 mg total) by mouth once daily.     rosuvastatin 10 MG tablet  Commonly known as: CRESTOR  TAKE 1 TABLET(10 MG) BY MOUTH EVERY DAY     syringe with needle 1 mL 25 gauge x 1" Syrg  Inject 1 Syringe as directed every 7 days. Disp 1 box     testosterone cypionate 200 mg/mL injection  Commonly known as: DEPOTESTOTERONE " CYPIONATE  Inject 0.5 mLs (100 mg total) into the muscle every 7 days.            STOP taking these medications      amlodipine-olmesartan 5-40 mg per tablet  Commonly known as: FLO            Time spent on the discharge of patient: 30 minutes    Amanda Baptiste PA-C  Cardiothoracic Surgery  [facility] - Cardiology Stepdown

## 2022-09-16 NOTE — PLAN OF CARE
[facility] - Cardiology Stepdown  Discharge Final Note    Primary Care Provider: Warner Macedo MD    Expected Discharge Date: 9/16/2022    Final Discharge Note (most recent)       Final Note - 09/16/22 1611          Final Note    Assessment Type Final Discharge Note     Anticipated Discharge Disposition Home or Self Care     Hospital Resources/Appts/Education Provided Provided patient/caregiver with written discharge plan information;Appointments scheduled by Navigator/Coordinator;Provided education on problems/symptoms using teachback                     Important Message from Medicare             Contact Info       Anthony Carlton MD   Specialty: Cardiology, Interventional Cardiology    [address] [facility]  [facility] [address]   Phone: [phone]       Next Steps: Go on 9/19/2022    Instructions: Go to [facility] to have your coumadin INR checked anytime Monday 9/19/22        ROBERTO PulliamN, RN    [phone]

## 2022-09-16 NOTE — PLAN OF CARE
"[facility] - Cardiology Stepdown  Discharge Final Note    Primary Care Provider: Warner Macedo MD    Expected Discharge Date: 9/16/2022    Final Discharge Note (most recent)       Final Note - 09/16/22 1424          Final Note    Assessment Type Final Discharge Note     Anticipated Discharge Disposition Home or Self Care                 Discharge summary and op notes faxed to cardiologist's office per their request:    Your fax has been successfully sent to [phone] at [phone].  ------------------------------------------------------------  From: [id number]  ------------------------------------------------------------  9/16/2022 2:18:26 PM Transmission Record   Sent to [phone] with remote ID "[id number]"   Result: (0/339;0/0) Success   Page record: 1 - 11   Elapsed time: 03:49 on channel 8      Sabrina Mims LMSW  [facility]  [id number]      Future Appointments   Date Time Provider Department Center   10/11/2022 10:15 AM EKG, APPT [facility] EKG [facility]   10/11/2022 10:30 AM [facility] XROP3 485 LB LIMIT [facility] XRAY OP [facility]   10/11/2022 10:45 AM Thomas Boudreaux MD [facility] CARDVAS [facility]           Contact Info       Anthony Carlton MD   Specialty: Cardiology, Interventional Cardiology    [address] [facility] [address] [facility]  [place] [address]   Phone: [phone]       Next Steps: Go on 9/19/2022    Instructions: Go to [facility] to have your coumadin INR checked anytime Monday 9/19/22            "

## 2022-09-16 NOTE — PROGRESS NOTES
Met with pt at bedside and reviewed wound care instructions for pt's midsternal and chest tube site incisions, s/p aortic root replacement, aortic valve replacement.  Reviewed daily inspection and cleaning of surgical incisions and instructed pt to call us with any questions or concerns.  Pt provided with a hand-out (see below) which contains detailed instructions on daily wound care inspection and care.  Pt reminded of his 5 pound lifting, pushing, and pulling restrictions for the first 6 weeks following his surgery and to refrain from driving until released by Dr. Boudreaux.  Pt informed of his post-op appts on October 11 and was provided with a copy of his appts for that day.  Pt verbalized understanding of all instructions.        Showering     Shower daily with normal bar soap, not perfumed soap or body wash. During your recovery, do not try a new brand of soap. Only use soap and water to cleanse the sites.     Shower with your back to the shower spray.  It is ok for your incision to get wet, but the shower spray should not directly hit your chest.    Place soapy water on your hand or on a clean washcloth and gently wash your incisions using an up-and-down motion.     The water temperature should be warm -- not too hot or cold. Extreme water temperatures can cause you to feel faint.    Do not apply ointments, oils, or salves to your incisions.     Pat the skin gently to dry using a clean towel.    Do not soak in a tub.     Caring for your Incision    Wash your hands before and after caring for or touching your incisions.    Look in the mirror daily. Inspect your incisions for redness, drainage and warmth. Your incisions should be healing; there should NOT be an increase in opening.    Gently wash your incisions every day following the instructions listed above.     Remove the steri-strips very gently (while in the shower) after you have been home for 1 week or when the edges of the strips start to curl up.      You do not need to cover your incisions unless they are draining.  If you are experiencing drainage, it is important to call your doctor.  Monday - Friday, from 8:00am - 5pm, call [phone].      When to Call    Increased drainage or oozing from your incision(s).    Increased opening of the incision line(s) - there should not be gaps or openings of the incision.    Redness along the incision(s) - if the incisions were pink and red when you left the hospital, they should be improving and not becoming more red.     Warmth along the incision line(s).    Increased body temperature/fever of 100.4 degrees Fahrenheit or higher.    If you have diabetes and your blood sugar levels begin to vary.

## 2022-09-16 NOTE — CARE UPDATE
SIGN OFF    Diet Cardiac Fluid - 1500mL  4 Days Post-Op      Discontinue BG monitoring. Will sign off. Please re-consult Endo as needed.    Patient has no Dx of DM, and is tolerating PO intake without BG excursions and/or complications.     Thank you for the consult.     ** Please call Endocrine for any BG related issues **     Lab Results   Component Value Date    HGBA1C 5.4 09/08/2022

## 2022-09-16 NOTE — PLAN OF CARE
Problem: Adult Inpatient Plan of Care  Goal: Plan of Care Review  Outcome: Ongoing, Progressing   Patient Aox4, RA, ambulatory. Patient developed a fever overnight of 100.9. Moderate complaints of pain, prn meds given. Temp improved. Minimum output from chest tube. Will continue to monitor.

## 2022-09-16 NOTE — NURSING
"Patient on Heparin drip with "no nomogram" set at 18 ml/hr (1800 units). 2300 aptt resulted at 47.6, MD notified. RN instructed not to make any changes to heparin. Patient stable, will continue to monitor.   "

## 2022-09-16 NOTE — NURSING
Patient is ready for discharge. Patient stable alert and oriented. IVs removed. No complaints of pain. Discussed discharge plan. Reviewed medications, appointments, and answered questions with patient and family. Reviewed sternal precautions and incision care with pt and pt's wife prior to discharge. Avs reviewed and given to pt.

## 2022-09-16 NOTE — PLAN OF CARE
CM called [facility] [phone] to Dr Carlton office to schedule pts coumadin follow up. VIGNESH spoke to CC at  and also GORDON Bennett informed pt needs to be seen Monday for INR 2-3 for mechanical valve for life. They said he can just come anytime Monday and he will be seen.    RICHARD Patel informed and pt also updated of his appointment.    ROBERTO PulliamN, RN    [phone]

## 2022-09-16 NOTE — HOSPITAL COURSE
On 9/12/22, the patient was taken to the Operating Room for the above stated procedure. Please see the previously dictated operative report for complete details. Postoperatively, the patient was taken from the  Operating Room to the ICU where the vital signs were monitored and pain was kept under control. The patient was weaned from the drips and extubated in the ICU per protocol. Once hemodynamically stable, the patient was transferred to the Cardiac Step-Down floor for continued strengthening and ambulation. On postoperative day 4, the patient was ready for discharge to home. Patient was transfused one unit of blood prior to discharge for an H/H of 6.6/20. At the time of discharge, the patient was ambulating unassisted. Pain was well controlled with oral analgesics and the patient was tolerating the diet.     MOBILITY AND ACTIVITY: As tolerated. Patient may shower. No heavy lifting of greater than 5 pounds and no driving.     DIET: An 1800-calorie ADA with a 1500 mL fluid restriction.     WOUND CARE INSTRUCTIONS: Check for redness, swelling and drainage around the  incision or wound. Patient is to call for any obvious bleeding, drainage, pus from the wound, unusual problems or difficulties or temperature of greater than 101   degrees.     FOLLOWUP: Follow up with Dr. Boudreaux in approximately 3 weeks. Prior to this  appointment, the patient will have a chest x-ray and EKG.     Patient not placed on Ace-Inhibitor at the time of discharge due to potential for hypotension     Dr. Carlton to follow coumadin. INR goal 2-3. At the time of discharge INR was 2.     Patient was bradycardic in 50s at baseline prior to surgery but HR remained elevated to low 100s while inpatient. Will discharge on Metoprolol 50mg BID but discussed with patient to decrease dose if HR drops when he gets home.     DISCHARGE CONDITION: At the time of discharge, the patient was in sinus rhythm and afebrile with stable vital signs.

## 2022-09-16 NOTE — PLAN OF CARE
Problem: Adult Inpatient Plan of Care  Goal: Patient-Specific Goal (Individualized)  Outcome: Ongoing, Progressing  Flowsheets (Taken 9/16/2022 1032)  Anxieties, Fears or Concerns: getting CT taken out  Individualized Care Needs: 1 U PRBCs  Patient-Specific Goals (Include Timeframe): get home ASAP     Problem: Adjustment to Surgery (Cardiovascular Surgery)  Goal: Optimal Coping with Heart Surgery  Outcome: Ongoing, Progressing  Intervention: Support Psychosocial Response to Surgery  Flowsheets (Taken 9/16/2022 1032)  Supportive Measures: active listening utilized  Family/Support System Care: caregiver stress acknowledged     Problem: Bowel Motility Impaired (Cardiovascular Surgery)  Goal: Effective Bowel Elimination (Cardiovascular Surgery)  Outcome: Ongoing, Progressing  Intervention: Enhance Bowel Motility and Elimination  Flowsheets (Taken 9/16/2022 1032)  Bowel Elimination Management: toileting offered  Bowel Motility Enhancement:   ambulation promoted   oral intake encouraged       Plan of care discussed with patient.  Patient ambulating with standby assistance, fall precautions in place. Continuing to encourage sternal precautions, IS, and ambulation. Patient's pain managed with scheduled and PRN meds. Discussed medications and care. Patient has no questions at this time. Plan for pt to discharge after receiving unit of PRBC.

## 2022-09-16 NOTE — HPI
Mr. Ledbetter is a very pleasant 47-year-old gentleman who has been followed for hypertension and a family history of heart disease.  He had an echo which demonstrated a bicuspid aortic valve as well as an ascending aortic aneurysm.  This was done in October of 2020.  He had a follow-up study in July of this year which demonstrated significant interval increase in the size of his ascending aortic aneurysm.  His aneurysmal dimensions were confirmed with a CT scan.  Given the size of his aneurysm, and the change over time, surgical correction was recommended.  We had a long preoperative discussion regarding management of his bicuspid aortic valve.  He elected to proceed with replacement.

## 2022-09-19 ENCOUNTER — PATIENT MESSAGE (OUTPATIENT)
Dept: FAMILY MEDICINE | Facility: CLINIC | Age: 47
End: 2022-09-19
Payer: COMMERCIAL

## 2022-09-19 ENCOUNTER — PATIENT OUTREACH (OUTPATIENT)
Dept: ADMINISTRATIVE | Facility: CLINIC | Age: 47
End: 2022-09-19
Payer: COMMERCIAL

## 2022-09-19 ENCOUNTER — PATIENT MESSAGE (OUTPATIENT)
Dept: CARDIOTHORACIC SURGERY | Facility: CLINIC | Age: 47
End: 2022-09-19
Payer: COMMERCIAL

## 2022-09-19 DIAGNOSIS — E29.1 MALE HYPOGONADISM: Primary | ICD-10-CM

## 2022-09-19 DIAGNOSIS — E29.1 MALE HYPOGONADISM: ICD-10-CM

## 2022-09-19 LAB
FINAL PATHOLOGIC DIAGNOSIS: NORMAL
GROSS: NORMAL
Lab: NORMAL

## 2022-09-19 RX ORDER — SYRINGE AND NEEDLE,INSULIN,1ML 25GX1"
SYRINGE, EMPTY DISPOSABLE MISCELLANEOUS
Qty: 12 EACH | Refills: 2 | Status: SHIPPED | OUTPATIENT
Start: 2022-09-19

## 2022-09-19 RX ORDER — TESTOSTERONE CYPIONATE 200 MG/ML
100 INJECTION, SOLUTION INTRAMUSCULAR
Qty: 10 ML | Refills: 1 | Status: SHIPPED | OUTPATIENT
Start: 2022-09-19 | End: 2023-02-12 | Stop reason: SDUPTHER

## 2022-09-19 NOTE — TELEPHONE ENCOUNTER
No new care gaps identified.  [facility] Embedded Care Gaps. Reference number: [id number]. 9/19/2022   3:48:23 PM CDT

## 2022-09-19 NOTE — PROGRESS NOTES
C3 nurse spoke with Alden Ledbetter  for a TCC post hospital discharge follow up call. The patient does not have a scheduled HOSFU appointment with Alden Ledbetter  within 5-7 days post hospital discharge date 9/16/22. C3 nurse was unable to schedule HOSFU appointment in [facility].    Message sent to PCP staff requesting they contact patient and schedule follow up appointment.

## 2022-09-20 ENCOUNTER — PATIENT MESSAGE (OUTPATIENT)
Dept: FAMILY MEDICINE | Facility: CLINIC | Age: 47
End: 2022-09-20
Payer: COMMERCIAL

## 2022-09-20 RX ORDER — TESTOSTERONE CYPIONATE 200 MG/ML
100 INJECTION, SOLUTION INTRAMUSCULAR
Qty: 10 ML | Refills: 1 | Status: CANCELLED | OUTPATIENT
Start: 2022-09-20

## 2022-09-28 ENCOUNTER — TELEPHONE (OUTPATIENT)
Dept: FAMILY MEDICINE | Facility: CLINIC | Age: 47
End: 2022-09-28

## 2022-09-28 ENCOUNTER — OFFICE VISIT (OUTPATIENT)
Dept: FAMILY MEDICINE | Facility: CLINIC | Age: 47
End: 2022-09-28
Payer: COMMERCIAL

## 2022-09-28 VITALS
WEIGHT: 226.63 LBS | DIASTOLIC BLOOD PRESSURE: 72 MMHG | BODY MASS INDEX: 27.6 KG/M2 | HEIGHT: 76 IN | SYSTOLIC BLOOD PRESSURE: 130 MMHG

## 2022-09-28 DIAGNOSIS — Z01.89 ENCOUNTER FOR ROUTINE LABORATORY TESTING: ICD-10-CM

## 2022-09-28 DIAGNOSIS — Z86.79 HISTORY OF THORACIC AORTIC ANEURYSM REPAIR: ICD-10-CM

## 2022-09-28 DIAGNOSIS — Q23.1 BICUSPID AORTIC VALVE: ICD-10-CM

## 2022-09-28 DIAGNOSIS — E29.1 MALE HYPOGONADISM: ICD-10-CM

## 2022-09-28 DIAGNOSIS — Z12.5 SCREENING PSA (PROSTATE SPECIFIC ANTIGEN): ICD-10-CM

## 2022-09-28 DIAGNOSIS — I10 ESSENTIAL HYPERTENSION: ICD-10-CM

## 2022-09-28 DIAGNOSIS — Z98.890 HISTORY OF THORACIC AORTIC ANEURYSM REPAIR: ICD-10-CM

## 2022-09-28 DIAGNOSIS — Z09 HOSPITAL DISCHARGE FOLLOW-UP: Primary | ICD-10-CM

## 2022-09-28 DIAGNOSIS — Z11.59 NEED FOR HEPATITIS C SCREENING TEST: ICD-10-CM

## 2022-09-28 DIAGNOSIS — Z95.2 H/O HEART VALVE REPLACEMENT WITH MECHANICAL VALVE: ICD-10-CM

## 2022-09-28 DIAGNOSIS — E78.2 MIXED HYPERLIPIDEMIA: ICD-10-CM

## 2022-09-28 DIAGNOSIS — D64.9 ANEMIA, UNSPECIFIED TYPE: ICD-10-CM

## 2022-09-28 PROCEDURE — 3044F HG A1C LEVEL LT 7.0%: CPT | Mod: CPTII,S$GLB,, | Performed by: INTERNAL MEDICINE

## 2022-09-28 PROCEDURE — 3078F DIAST BP <80 MM HG: CPT | Mod: CPTII,S$GLB,, | Performed by: INTERNAL MEDICINE

## 2022-09-28 PROCEDURE — 1159F MED LIST DOCD IN RCRD: CPT | Mod: CPTII,S$GLB,, | Performed by: INTERNAL MEDICINE

## 2022-09-28 PROCEDURE — 3075F PR MOST RECENT SYSTOLIC BLOOD PRESS GE 130-139MM HG: ICD-10-PCS | Mod: CPTII,S$GLB,, | Performed by: INTERNAL MEDICINE

## 2022-09-28 PROCEDURE — 3008F BODY MASS INDEX DOCD: CPT | Mod: CPTII,S$GLB,, | Performed by: INTERNAL MEDICINE

## 2022-09-28 PROCEDURE — 99214 PR OFFICE/OUTPT VISIT, EST, LEVL IV, 30-39 MIN: ICD-10-PCS | Mod: S$GLB,,, | Performed by: INTERNAL MEDICINE

## 2022-09-28 PROCEDURE — 99999 PR PBB SHADOW E&M-EST. PATIENT-LVL III: ICD-10-PCS | Mod: PBBFAC,,, | Performed by: INTERNAL MEDICINE

## 2022-09-28 PROCEDURE — 1111F PR DISCHARGE MEDS RECONCILED W/ CURRENT OUTPATIENT MED LIST: ICD-10-PCS | Mod: CPTII,S$GLB,, | Performed by: INTERNAL MEDICINE

## 2022-09-28 PROCEDURE — 3078F PR MOST RECENT DIASTOLIC BLOOD PRESSURE < 80 MM HG: ICD-10-PCS | Mod: CPTII,S$GLB,, | Performed by: INTERNAL MEDICINE

## 2022-09-28 PROCEDURE — 3044F PR MOST RECENT HEMOGLOBIN A1C LEVEL <7.0%: ICD-10-PCS | Mod: CPTII,S$GLB,, | Performed by: INTERNAL MEDICINE

## 2022-09-28 PROCEDURE — 99214 OFFICE O/P EST MOD 30 MIN: CPT | Mod: S$GLB,,, | Performed by: INTERNAL MEDICINE

## 2022-09-28 PROCEDURE — 1160F RVW MEDS BY RX/DR IN RCRD: CPT | Mod: CPTII,S$GLB,, | Performed by: INTERNAL MEDICINE

## 2022-09-28 PROCEDURE — 1160F PR REVIEW ALL MEDS BY PRESCRIBER/CLIN PHARMACIST DOCUMENTED: ICD-10-PCS | Mod: CPTII,S$GLB,, | Performed by: INTERNAL MEDICINE

## 2022-09-28 PROCEDURE — 4010F PR ACE/ARB THEARPY RXD/TAKEN: ICD-10-PCS | Mod: CPTII,S$GLB,, | Performed by: INTERNAL MEDICINE

## 2022-09-28 PROCEDURE — 4010F ACE/ARB THERAPY RXD/TAKEN: CPT | Mod: CPTII,S$GLB,, | Performed by: INTERNAL MEDICINE

## 2022-09-28 PROCEDURE — 1159F PR MEDICATION LIST DOCUMENTED IN MEDICAL RECORD: ICD-10-PCS | Mod: CPTII,S$GLB,, | Performed by: INTERNAL MEDICINE

## 2022-09-28 PROCEDURE — 99999 PR PBB SHADOW E&M-EST. PATIENT-LVL III: CPT | Mod: PBBFAC,,, | Performed by: INTERNAL MEDICINE

## 2022-09-28 PROCEDURE — 1111F DSCHRG MED/CURRENT MED MERGE: CPT | Mod: CPTII,S$GLB,, | Performed by: INTERNAL MEDICINE

## 2022-09-28 PROCEDURE — 3075F SYST BP GE 130 - 139MM HG: CPT | Mod: CPTII,S$GLB,, | Performed by: INTERNAL MEDICINE

## 2022-09-28 PROCEDURE — 3008F PR BODY MASS INDEX (BMI) DOCUMENTED: ICD-10-PCS | Mod: CPTII,S$GLB,, | Performed by: INTERNAL MEDICINE

## 2022-09-28 RX ORDER — ROSUVASTATIN CALCIUM 10 MG/1
10 TABLET, COATED ORAL DAILY
Qty: 90 TABLET | Refills: 3 | Status: SHIPPED | OUTPATIENT
Start: 2022-09-28 | End: 2023-10-29

## 2022-09-28 RX ORDER — SYRINGE WITH NEEDLE, 1 ML 25GX5/8"
SYRINGE, EMPTY DISPOSABLE MISCELLANEOUS
COMMUNITY

## 2022-09-28 RX ORDER — ESCITALOPRAM OXALATE 5 MG/1
5 TABLET ORAL DAILY
Qty: 90 TABLET | Refills: 3 | Status: SHIPPED | OUTPATIENT
Start: 2022-09-28 | End: 2023-02-28 | Stop reason: SDUPTHER

## 2022-09-28 NOTE — PROGRESS NOTES
Subjective:       Patient ID: Alden Ledbetter is a 47 y.o. male.    Chief Complaint: Hospital Follow Up  PSA: 0.9 (8/2021  Colonoscopy: 7/2015 Dr Arzate wnl   Immunizations: Flu: yes Tdap: 2018   Covid: yes   Smoker: never   HPI    Hospital follow up   Bicuspid AV with moderate AR. On an echo from 10/26/2020 a bicuspid AV was noted along with a TAA measuring ~4.3cm. A repeat echo from 7/6/22 noted the TAA at 5.6cm w dilated aortic root.  s/p Aortic root replacement (Bentall procedure) with a 25 mm Carbomedics cardioseal mechanical valve. Placed on life long Warfarin.  Healing well still have some fatigue.  Also post procedure anemia which is slowly improving.  Has upcoming new echo scan and follow up with cardiac surgeon.     Symptomatic anemia:  Status post 1 unit in the hospital.  Most recent lab hemoglobin 8.7 = MCV  80.  Rx 2 oral iron pills daily      Cardio:   Bicuspid aortic valve: dx 2020 s/p repair 2022 w mechanical valve.  Mgmt cardio Dr Carlton   HTN: controlled Rx Tony 5/40   HLD: controlled Rx Crestor 10 // LDL 77   Low testosterone: dx approx 2018//  Change to testosterone cypionate injection 0.5 weekly.  Improved energy, less fatigue.  Is finding gaining weight.  Previously used Axiron 2 // T 918 1 day after shot.  (Prev starting low levels labs 284 & 3.4 and 194 & T 5.7)   Anxiety: controlled   Rx lexapro 5   GERD: controlled Rx Nexium 40  Glucose intolerance:  Glucose 102-- previous 100.  .        Review of Systems:  Review of Systems   Constitutional:  Positive for fatigue. Negative for chills.   HENT:  Negative for drooling.    Eyes:  Negative for pain.   Respiratory:  Negative for choking.    Cardiovascular:  Negative for chest pain.   Gastrointestinal:  Negative for blood in stool.   Genitourinary:  Negative for hematuria.   Musculoskeletal:  Negative for joint swelling.   Skin:  Negative for pallor.   Neurological:  Negative for facial asymmetry.   Psychiatric/Behavioral:  Negative for  "confusion.      Objective:     Vitals:    09/28/22 0815   BP: 130/72   BP Location: Right arm   Weight: 102.8 kg (226 lb 10.1 oz)   Height: 6' 4" (1.93 m)          Physical Exam  Vitals reviewed.   Constitutional:       Appearance: Normal appearance.   HENT:      Head: Normocephalic and atraumatic.      Mouth/Throat:      Pharynx: Oropharynx is clear.   Eyes:      Extraocular Movements: Extraocular movements intact.      Conjunctiva/sclera: Conjunctivae normal.      Pupils: Pupils are equal, round, and reactive to light.   Cardiovascular:      Rate and Rhythm: Normal rate and regular rhythm.      Heart sounds: Normal heart sounds.      Comments: Heart click   Pulmonary:      Effort: Pulmonary effort is normal.      Breath sounds: Normal breath sounds.   Musculoskeletal:      Cervical back: Normal range of motion and neck supple.   Skin:     General: Skin is warm and dry.   Neurological:      Mental Status: He is alert and oriented to person, place, and time.   Psychiatric:         Mood and Affect: Mood normal.       Medication List with Changes/Refills   Current Medications    ACETAMINOPHEN (TYLENOL) 500 MG TABLET    Take 2 tablets (1,000 mg total) by mouth every 6 (six) hours as needed for Pain.    AMLODIPINE (NORVASC) 10 MG TABLET    Take 1 tablet (10 mg total) by mouth once daily.    ASPIRIN (ECOTRIN) 81 MG EC TABLET    Take 1 tablet (81 mg total) by mouth once daily.    CETIRIZINE 10 MG CAP    Zyrtec 10 mg capsule   Take 1 capsule every day by oral route.    DOCUSATE SODIUM (COLACE) 100 MG CAPSULE    Take 1 capsule (100 mg total) by mouth 2 (two) times daily.    ESOMEPRAZOLE (NEXIUM) 40 MG CAPSULE    Take 20 mg by mouth before breakfast.    FERROUS SULFATE 325 (65 FE) MG EC TABLET    Take 1 tablet (325 mg total) by mouth 2 (two) times daily.    INSULIN SYRINGE-NEEDLE U-100 (BD INSULIN SYRINGE) 1 ML 25 X 1" SYRG    INJECT 1 SYRINGE AS DIRECTED EVERY 7 DAYS    METOPROLOL TARTRATE (LOPRESSOR) 50 MG TABLET    Take 1 " "tablet (50 mg total) by mouth 2 (two) times daily.    SYRINGE WITH NEEDLE (BD LUER-KLAUDIA SYRINGE) 3 ML 25 GAUGE X 1" SYRG    BD Luer-Klaudia Syringe 3 mL 25 gauge x 1"   ONE SYRINGE AS DIRECTED EVERY 7 DAYS    SYRINGE WITH NEEDLE 1 ML 25 GAUGE X 1" SYRG    Inject 1 Syringe as directed every 7 days. Disp 1 box    TESTOSTERONE CYPIONATE (DEPOTESTOTERONE CYPIONATE) 200 MG/ML INJECTION    Inject 0.5 mLs (100 mg total) into the muscle every 7 days.    WARFARIN (COUMADIN) 5 MG TABLET    Take 1 tablet (5 mg total) by mouth Daily.   Changed and/or Refilled Medications    Modified Medication Previous Medication    ESCITALOPRAM OXALATE (LEXAPRO) 5 MG TAB EScitalopram oxalate (LEXAPRO) 5 MG Tab       Take 1 tablet (5 mg total) by mouth once daily.    Take 1 tablet (5 mg total) by mouth once daily.    ROSUVASTATIN (CRESTOR) 10 MG TABLET rosuvastatin (CRESTOR) 10 MG tablet       Take 1 tablet (10 mg total) by mouth once daily.    TAKE 1 TABLET(10 MG) BY MOUTH EVERY DAY   Discontinued Medications    AZELASTINE (ASTELIN) 137 MCG (0.1 %) NASAL SPRAY    azelastine 137 mcg (0.1 %) nasal spray aerosol   USE 1 SPRAY NASALLY TWICE DAILY FOR 7 DAYS    FUROSEMIDE (LASIX) 20 MG TABLET    Take one tablet by mouth twice daily for 7 days then decrease to once daily    K PHOS DI & MONO-SOD PHOS MONO (K-PHOS-NEUTRAL) 250 MG TAB    Take 2 tablets by mouth 2 (two) times a day. for 5 days    POLYETHYLENE GLYCOL (GLYCOLAX) 17 GRAM PWPK    Take 17 g by mouth once daily.    POTASSIUM CHLORIDE SA (K-DUR,KLOR-CON) 20 MEQ TABLET    Take one tablet by mouth twice daily for 7 days then decrease to once daily       Assessment & Plan:  1. Hospital discharge follow-up    2. H/O heart valve replacement with mechanical valve    3. Bicuspid aortic valve    4. Mixed hyperlipidemia  - rosuvastatin (CRESTOR) 10 MG tablet; Take 1 tablet (10 mg total) by mouth once daily.  Dispense: 90 tablet; Refill: 3    5. History of thoracic aortic aneurysm repair    6. Essential " hypertension    7. Anemia, unspecified type    8. Need for hepatitis C screening test  - Hepatitis C Antibody; Future    9. Screening PSA (prostate specific antigen)  - PSA, Screening; Future    10. Encounter for routine laboratory testing  - PSA, Screening; Future  - Hepatitis C Antibody; Future    11. Male hypogonadism   Hospital discharge follow-up    H/O heart valve replacement with mechanical valve    Bicuspid aortic valve    Mixed hyperlipidemia  -     rosuvastatin (CRESTOR) 10 MG tablet; Take 1 tablet (10 mg total) by mouth once daily.  Dispense: 90 tablet; Refill: 3    History of thoracic aortic aneurysm repair    Essential hypertension    Anemia, unspecified type    Need for hepatitis C screening test  -     Hepatitis C Antibody; Future; Expected date: 09/28/2022    Screening PSA (prostate specific antigen)  -     PSA, Screening; Future; Expected date: 09/28/2022    Encounter for routine laboratory testing  -     PSA, Screening; Future; Expected date: 09/28/2022  -     Hepatitis C Antibody; Future; Expected date: 09/28/2022    Male hypogonadism  Comments:  working on getting approval w insurance -prev covered now not ? he will call insurance     Other orders  -     EScitalopram oxalate (LEXAPRO) 5 MG Tab; Take 1 tablet (5 mg total) by mouth once daily.  Dispense: 90 tablet; Refill: 3      Continue to work on regular exercise, maintain healthy weight, balanced diet. Avoid unhealthy habits: smoking, excessive alcohol intake.

## 2022-09-28 NOTE — TELEPHONE ENCOUNTER
----- Message from Danielle Khan sent at 9/28/2022 10:21 AM CDT -----  Contact: Santhosh  Type:  Needs Medical Advice    Who Called:  Santhosh [employer]       Would the patient rather a call back or a response via [facility]?  Call    Best Call Back Number:  [phone]    Additional Information:  Santhosh [employer] is needing the office to fax him paperwork to get auth for for Testerone injection     Please fax the Auth forms to [phone]

## 2022-09-30 ENCOUNTER — PATIENT MESSAGE (OUTPATIENT)
Dept: FAMILY MEDICINE | Facility: CLINIC | Age: 47
End: 2022-09-30
Payer: COMMERCIAL

## 2022-09-30 ENCOUNTER — TELEPHONE (OUTPATIENT)
Dept: FAMILY MEDICINE | Facility: CLINIC | Age: 47
End: 2022-09-30

## 2022-09-30 RX ORDER — ZOLPIDEM TARTRATE 10 MG/1
10 TABLET ORAL NIGHTLY PRN
Qty: 30 TABLET | Refills: 0 | Status: SHIPPED | OUTPATIENT
Start: 2022-09-30 | End: 2022-12-28

## 2022-09-30 NOTE — TELEPHONE ENCOUNTER
----- Message from James Terrell sent at 9/30/2022  9:53 AM CDT -----  Name of Who is Calling: VIRGINIE HARDY [[id number]] Litzy      What is the request in detail: [employer]zy called requesting a call back regarding prior auth for prescription testosterone cypionate (DEPOTESTOTERONE CYPIONATE) 200 mg/mL injection.Please contact [employer]  Please advise      Can the clinic reply by [facility]: No      What Number to Call Back [facility]:[phone]     15-Aug-2021 09:31

## 2022-10-11 ENCOUNTER — PATIENT MESSAGE (OUTPATIENT)
Dept: FAMILY MEDICINE | Facility: CLINIC | Age: 47
End: 2022-10-11
Payer: COMMERCIAL

## 2022-10-11 ENCOUNTER — HOSPITAL ENCOUNTER (OUTPATIENT)
Dept: RADIOLOGY | Facility: HOSPITAL | Age: 47
Discharge: HOME | End: 2022-10-11
Attending: THORACIC SURGERY (CARDIOTHORACIC VASCULAR SURGERY)
Payer: COMMERCIAL

## 2022-10-11 ENCOUNTER — HOSPITAL ENCOUNTER (OUTPATIENT)
Dept: CARDIOLOGY | Facility: CLINIC | Age: 47
Discharge: HOME | End: 2022-10-11
Payer: COMMERCIAL

## 2022-10-11 ENCOUNTER — OFFICE VISIT (OUTPATIENT)
Dept: CARDIOTHORACIC SURGERY | Facility: CLINIC | Age: 47
End: 2022-10-11
Payer: COMMERCIAL

## 2022-10-11 ENCOUNTER — DOCUMENTATION ONLY (OUTPATIENT)
Dept: CARDIOTHORACIC SURGERY | Facility: CLINIC | Age: 47
End: 2022-10-11

## 2022-10-11 VITALS
SYSTOLIC BLOOD PRESSURE: 117 MMHG | HEIGHT: 76 IN | RESPIRATION RATE: 18 BRPM | DIASTOLIC BLOOD PRESSURE: 71 MMHG | BODY MASS INDEX: 27.28 KG/M2 | OXYGEN SATURATION: 100 % | HEART RATE: 75 BPM | WEIGHT: 224 LBS

## 2022-10-11 DIAGNOSIS — Z98.890 H/O AORTIC ROOT REPAIR: ICD-10-CM

## 2022-10-11 DIAGNOSIS — Z98.890 S/P ASCENDING AORTIC ANEURYSM REPAIR: ICD-10-CM

## 2022-10-11 DIAGNOSIS — Z95.2 S/P AVR (AORTIC VALVE REPLACEMENT): Primary | ICD-10-CM

## 2022-10-11 DIAGNOSIS — Z95.2 S/P AORTIC VALVE REPLACEMENT: ICD-10-CM

## 2022-10-11 DIAGNOSIS — Z86.79 S/P ASCENDING AORTIC ANEURYSM REPAIR: ICD-10-CM

## 2022-10-11 DIAGNOSIS — E29.1 MALE HYPOGONADISM: Primary | ICD-10-CM

## 2022-10-11 PROCEDURE — 1159F MED LIST DOCD IN RCRD: CPT | Mod: CPTII,S$GLB,, | Performed by: THORACIC SURGERY (CARDIOTHORACIC VASCULAR SURGERY)

## 2022-10-11 PROCEDURE — 3078F DIAST BP <80 MM HG: CPT | Mod: CPTII,S$GLB,, | Performed by: THORACIC SURGERY (CARDIOTHORACIC VASCULAR SURGERY)

## 2022-10-11 PROCEDURE — 3078F PR MOST RECENT DIASTOLIC BLOOD PRESSURE < 80 MM HG: ICD-10-PCS | Mod: CPTII,S$GLB,, | Performed by: THORACIC SURGERY (CARDIOTHORACIC VASCULAR SURGERY)

## 2022-10-11 PROCEDURE — 93010 ELECTROCARDIOGRAM REPORT: CPT | Mod: S$GLB,,, | Performed by: INTERNAL MEDICINE

## 2022-10-11 PROCEDURE — 99999 PR PBB SHADOW E&M-EST. PATIENT-LVL IV: ICD-10-PCS | Mod: PBBFAC,,, | Performed by: THORACIC SURGERY (CARDIOTHORACIC VASCULAR SURGERY)

## 2022-10-11 PROCEDURE — 99024 POSTOP FOLLOW-UP VISIT: CPT | Mod: S$GLB,,, | Performed by: THORACIC SURGERY (CARDIOTHORACIC VASCULAR SURGERY)

## 2022-10-11 PROCEDURE — 93005 ELECTROCARDIOGRAM TRACING: CPT | Mod: S$GLB,,, | Performed by: THORACIC SURGERY (CARDIOTHORACIC VASCULAR SURGERY)

## 2022-10-11 PROCEDURE — 71046 X-RAY EXAM CHEST 2 VIEWS: CPT | Mod: TC,FY

## 2022-10-11 PROCEDURE — 3074F PR MOST RECENT SYSTOLIC BLOOD PRESSURE < 130 MM HG: ICD-10-PCS | Mod: CPTII,S$GLB,, | Performed by: THORACIC SURGERY (CARDIOTHORACIC VASCULAR SURGERY)

## 2022-10-11 PROCEDURE — 3044F PR MOST RECENT HEMOGLOBIN A1C LEVEL <7.0%: ICD-10-PCS | Mod: CPTII,S$GLB,, | Performed by: THORACIC SURGERY (CARDIOTHORACIC VASCULAR SURGERY)

## 2022-10-11 PROCEDURE — 4010F PR ACE/ARB THEARPY RXD/TAKEN: ICD-10-PCS | Mod: CPTII,S$GLB,, | Performed by: THORACIC SURGERY (CARDIOTHORACIC VASCULAR SURGERY)

## 2022-10-11 PROCEDURE — 99024 PR POST-OP FOLLOW-UP VISIT: ICD-10-PCS | Mod: S$GLB,,, | Performed by: THORACIC SURGERY (CARDIOTHORACIC VASCULAR SURGERY)

## 2022-10-11 PROCEDURE — 71046 XR CHEST PA AND LATERAL: ICD-10-PCS | Mod: 26,,, | Performed by: RADIOLOGY

## 2022-10-11 PROCEDURE — 99999 PR PBB SHADOW E&M-EST. PATIENT-LVL IV: CPT | Mod: PBBFAC,,, | Performed by: THORACIC SURGERY (CARDIOTHORACIC VASCULAR SURGERY)

## 2022-10-11 PROCEDURE — 1159F PR MEDICATION LIST DOCUMENTED IN MEDICAL RECORD: ICD-10-PCS | Mod: CPTII,S$GLB,, | Performed by: THORACIC SURGERY (CARDIOTHORACIC VASCULAR SURGERY)

## 2022-10-11 PROCEDURE — 4010F ACE/ARB THERAPY RXD/TAKEN: CPT | Mod: CPTII,S$GLB,, | Performed by: THORACIC SURGERY (CARDIOTHORACIC VASCULAR SURGERY)

## 2022-10-11 PROCEDURE — 1160F RVW MEDS BY RX/DR IN RCRD: CPT | Mod: CPTII,S$GLB,, | Performed by: THORACIC SURGERY (CARDIOTHORACIC VASCULAR SURGERY)

## 2022-10-11 PROCEDURE — 3008F PR BODY MASS INDEX (BMI) DOCUMENTED: ICD-10-PCS | Mod: CPTII,S$GLB,, | Performed by: THORACIC SURGERY (CARDIOTHORACIC VASCULAR SURGERY)

## 2022-10-11 PROCEDURE — 3044F HG A1C LEVEL LT 7.0%: CPT | Mod: CPTII,S$GLB,, | Performed by: THORACIC SURGERY (CARDIOTHORACIC VASCULAR SURGERY)

## 2022-10-11 PROCEDURE — 93010 EKG 12-LEAD: ICD-10-PCS | Mod: S$GLB,,, | Performed by: INTERNAL MEDICINE

## 2022-10-11 PROCEDURE — 1160F PR REVIEW ALL MEDS BY PRESCRIBER/CLIN PHARMACIST DOCUMENTED: ICD-10-PCS | Mod: CPTII,S$GLB,, | Performed by: THORACIC SURGERY (CARDIOTHORACIC VASCULAR SURGERY)

## 2022-10-11 PROCEDURE — 93005 EKG 12-LEAD: ICD-10-PCS | Mod: S$GLB,,, | Performed by: THORACIC SURGERY (CARDIOTHORACIC VASCULAR SURGERY)

## 2022-10-11 PROCEDURE — 3008F BODY MASS INDEX DOCD: CPT | Mod: CPTII,S$GLB,, | Performed by: THORACIC SURGERY (CARDIOTHORACIC VASCULAR SURGERY)

## 2022-10-11 PROCEDURE — 71046 X-RAY EXAM CHEST 2 VIEWS: CPT | Mod: 26,,, | Performed by: RADIOLOGY

## 2022-10-11 PROCEDURE — 3074F SYST BP LT 130 MM HG: CPT | Mod: CPTII,S$GLB,, | Performed by: THORACIC SURGERY (CARDIOTHORACIC VASCULAR SURGERY)

## 2022-10-11 RX ORDER — FERROUS SULFATE 325(65) MG
325 TABLET, DELAYED RELEASE (ENTERIC COATED) ORAL 2 TIMES DAILY
Qty: 60 TABLET | Refills: 0 | Status: SHIPPED | OUTPATIENT
Start: 2022-10-11 | End: 2022-11-10

## 2022-10-11 RX ORDER — METHOCARBAMOL 500 MG/1
TABLET, FILM COATED ORAL
COMMUNITY
End: 2023-02-28

## 2022-10-11 RX ORDER — OXYCODONE HYDROCHLORIDE 5 MG/1
TABLET ORAL
COMMUNITY
End: 2022-10-11

## 2022-10-11 RX ORDER — POLYETHYLENE GLYCOL 3350 17 G/17G
POWDER, FOR SOLUTION ORAL
COMMUNITY
End: 2022-10-11

## 2022-10-11 NOTE — PROGRESS NOTES
Phase II cardiac rehab orders submitted to [facility].  Pt instructed to follow-up with his cardiologist.  Pt reminded to continue with his 5 pound lifting, pushing, and pulling restrictions for two more weeks, and that he can then advance to lifting, pushing, and pulling up to 20 pounds for 6 weeks, for a total of 12 weeks of restrictions.  Pt reminded to continue washing his incisions everyday with soap and water.  Pt was provided with a copy of his AVS, and instructed on medication changes.  Pt verbalized understanding of all instructions.

## 2022-10-11 NOTE — PROGRESS NOTES
Patient seen and examined. Patient is progressively increasing activity. No significant complaints.     Sternum: stable, incision CDI  Chest xray: Acceptable post op chest  EKG: NSR     Assessment:  Aortic root replacement (Bentall procedure) with a 25 mm Carbomedics cardioseal mechanical valved conduit     Plan:  Can begin driving   Can begin cardiac rehab 6 weeks after operation   We will refer to cardiology to assume care   DC lasix, potassium  Re-prescribed iron         No scheduled appointment, RTC prn    CTS Attending Note:    I have personally taken the history and examined this patient and agree with the YOKASTA's note as stated above.

## 2022-10-11 NOTE — LETTER
[facility] 2nd Fl  [address]  [place] [address]  Phone: [phone] October 11, 2022          Anthony Carlton MD  [address] [facility]  [place] [address]    Patient: Alden Ledbetter   MR Number: [id number]   YOB: 1975   Date of Visit: 10/11/2022     Dear Dr. Carlton:     I had the pleasure of seeing your patient, Mr. Alden Ledbetter, in clinic today.  As you know, he is a very pleasant 47-year-old gentleman who has been followed for both bicuspid aortic valve as well as an ascending aortic aneurysm.  Over the course of the previous year, his ascending aorta increased significantly in size.  We had a good preoperative discussion regarding management of the valve, and he elected to proceed with valve replacement at the time of surgical correction of his ascending aortic aneurysm.  On September 12, 2022, he underwent aortic root replacement.  His postoperative course was unremarkable, and he was ultimately discharged home.  Today, he returns for routine follow-up, and in clinic today Mr. Boss looks fabulous.  On physical examination, the sternum appeared to be healing nicely.  His chest x-ray was clear.  His EKG demonstrated a normal sinus rhythm.  Overall, I am very pleased with how Mr. Ledbetter has done.  As a result, I did not schedule him to follow up with me.  Certainly, should you or he have any questions or concerns please do not hesitate to give me a call.      Thank you for sending this pleasant gentleman to me.  It is a pleasure to assist in his care.  Of note, I have included a copy of his operative report for your records.  Thank you again for sending him to me.    Sincerely,          Thomas Boudreaux MD    Enclosure

## 2022-12-20 NOTE — TELEPHONE ENCOUNTER
No new care gaps identified.  [facility] Embedded Care Gaps. Reference number: [id number]. 12/20/2022   2:26:55 PM CST

## 2022-12-20 NOTE — ASSESSMENT & PLAN NOTE
Managed per primary team  Optimize BG control       Patient needs a letter for work with the statutes and limitations for the holter monitor outlined .

## 2022-12-28 RX ORDER — ZOLPIDEM TARTRATE 10 MG/1
TABLET ORAL
Qty: 30 TABLET | Refills: 3 | Status: SHIPPED | OUTPATIENT
Start: 2022-12-28 | End: 2024-01-02

## 2023-02-12 DIAGNOSIS — Z01.89 ENCOUNTER FOR ROUTINE LABORATORY TESTING: Primary | ICD-10-CM

## 2023-02-12 DIAGNOSIS — E29.1 MALE HYPOGONADISM: ICD-10-CM

## 2023-02-12 DIAGNOSIS — E78.2 MIXED HYPERLIPIDEMIA: ICD-10-CM

## 2023-02-12 DIAGNOSIS — D64.9 ANEMIA, UNSPECIFIED TYPE: ICD-10-CM

## 2023-02-12 NOTE — TELEPHONE ENCOUNTER
No new care gaps identified.  [facility] Embedded Care Gaps. Reference number: [id number]. 2/12/2023   2:07:09 PM CST

## 2023-02-13 RX ORDER — TESTOSTERONE CYPIONATE 200 MG/ML
100 INJECTION, SOLUTION INTRAMUSCULAR
Qty: 10 ML | Refills: 0 | Status: SHIPPED | OUTPATIENT
Start: 2023-02-13 | End: 2023-05-02 | Stop reason: SDUPTHER

## 2023-02-13 NOTE — TELEPHONE ENCOUNTER
Please see the attached refill request.    They only did a partial refill on last prescription pickup (2) and only filling 4 at a time. Guessing its insurance but I will be due next sunday for next shota and out. Thanks, can get labs done if need be

## 2023-02-28 ENCOUNTER — OFFICE VISIT (OUTPATIENT)
Dept: FAMILY MEDICINE | Facility: CLINIC | Age: 48
End: 2023-02-28
Payer: COMMERCIAL

## 2023-02-28 DIAGNOSIS — F41.9 ANXIETY: ICD-10-CM

## 2023-02-28 DIAGNOSIS — Z95.2 H/O HEART VALVE REPLACEMENT WITH MECHANICAL VALVE: ICD-10-CM

## 2023-02-28 DIAGNOSIS — I71.21 ANEURYSM OF ASCENDING AORTA WITHOUT RUPTURE: ICD-10-CM

## 2023-02-28 DIAGNOSIS — I10 ESSENTIAL HYPERTENSION: Primary | ICD-10-CM

## 2023-02-28 DIAGNOSIS — E78.2 MIXED HYPERLIPIDEMIA: ICD-10-CM

## 2023-02-28 DIAGNOSIS — Z79.01 WARFARIN ANTICOAGULATION: ICD-10-CM

## 2023-02-28 DIAGNOSIS — Z12.5 SCREENING PSA (PROSTATE SPECIFIC ANTIGEN): ICD-10-CM

## 2023-02-28 DIAGNOSIS — E29.1 MALE HYPOGONADISM: ICD-10-CM

## 2023-02-28 DIAGNOSIS — D64.9 ANEMIA, UNSPECIFIED TYPE: ICD-10-CM

## 2023-02-28 DIAGNOSIS — Z01.89 ENCOUNTER FOR ROUTINE LABORATORY TESTING: ICD-10-CM

## 2023-02-28 PROCEDURE — 99214 PR OFFICE/OUTPT VISIT, EST, LEVL IV, 30-39 MIN: ICD-10-PCS | Mod: 95,,, | Performed by: INTERNAL MEDICINE

## 2023-02-28 PROCEDURE — 99214 OFFICE O/P EST MOD 30 MIN: CPT | Mod: 95,,, | Performed by: INTERNAL MEDICINE

## 2023-02-28 RX ORDER — LOSARTAN POTASSIUM 100 MG/1
100 TABLET ORAL
COMMUNITY
Start: 2023-02-01 | End: 2023-02-28

## 2023-02-28 RX ORDER — ESCITALOPRAM OXALATE 10 MG/1
10 TABLET ORAL DAILY
Qty: 90 TABLET | Refills: 3 | Status: SHIPPED | OUTPATIENT
Start: 2023-02-28 | End: 2024-02-20

## 2023-02-28 RX ORDER — LOSARTAN POTASSIUM 25 MG/1
TABLET ORAL
COMMUNITY
End: 2023-02-28

## 2023-02-28 RX ORDER — HYDROGEN PEROXIDE 3 %
SOLUTION, NON-ORAL MISCELLANEOUS
COMMUNITY
Start: 2023-02-01

## 2023-02-28 RX ORDER — DILTIAZEM HYDROCHLORIDE 120 MG/1
1 CAPSULE, EXTENDED RELEASE ORAL 2 TIMES DAILY
COMMUNITY
Start: 2023-02-05 | End: 2023-12-14

## 2023-02-28 NOTE — PROGRESS NOTES
Subjective:       Patient ID: Alden Ledbetter is a 47 y.o. male.    Chief Complaint: Follow-up   Follow-up  Pertinent negatives include no arthralgias, chest pain, chills, headaches, joint swelling, neck pain, vomiting or weakness.     PSA: 0.8 10/22  Colonoscopy: Dr Arzate 2022 normal   Immunizations: Flu: yes Tdap: 2018   Covid: yes   Smoker: never     Follow-up medication refill //needs to get labs done    Bicuspid AV w moderate AR, ascending aortic aneurysm:  s/p Aortic root replacement (Bentall procedure) w 25 mm Carbomedics cardioseal mechanical valve. Rx life long Warfarin.  Monitors INR at home.  Cardio Dr Carlton      Anemia post surgery.  Due for lab recheck taking iron.      HTN: controlled recently changed by cardio due to elevation. Rx losartan 100, diltiazem 120 twice daily.    Metoprolol for fatigue     HLD: controlled Rx Crestor 10     Low testosterone: dx approx 2018//  Change to testosterone cypionate injection 0.5 weekly.  Improved energy, less fatigue.  Is finding gaining weight.  Previously used Axiron 2 // T 918 1 day after shot.  (Prev starting low levels labs 284 & 3.4 and 194 & T 5.7)     Anxiety:  Worse since heart valve replacement.  Rx lexapro 5 will increase to 10 mg    GERD: controlled Rx Nexium 20 OTC    Glucose intolerance:  Glucose 102-- previous 100.      Insomnia: on off issues Rx Ambien 10      ____________________________________________________________________________________________________  Assessment & Plan:  1. Essential hypertension    2. Anxiety  - EScitalopram oxalate (LEXAPRO) 10 MG tablet; Take 1 tablet (10 mg total) by mouth once daily.  Dispense: 90 tablet; Refill: 3    3. Aneurysm of ascending aorta without rupture    4. Male hypogonadism    5. Mixed hyperlipidemia    6. H/O heart valve replacement with mechanical valve    7. Warfarin anticoagulation     Essential hypertension    Anxiety  -     EScitalopram oxalate (LEXAPRO) 10 MG tablet; Take 1 tablet (10 mg  total) by mouth once daily.  Dispense: 90 tablet; Refill: 3    Aneurysm of ascending aorta without rupture    Male hypogonadism    Mixed hyperlipidemia    H/O heart valve replacement with mechanical valve    Warfarin anticoagulation        Continue to work on regular exercise, maintain healthy weight, balanced diet. Avoid unhealthy habits: smoking, excessive alcohol intake.     Disclaimer: This note was partly generated using dictation software which may occasionally result in transcription errors  ____________________________________________________________________________________________________  Review of Systems:  Review of Systems   Constitutional:  Negative for activity change, chills and unexpected weight change.   HENT:  Negative for drooling, hearing loss, rhinorrhea and trouble swallowing.    Eyes:  Negative for pain, discharge and visual disturbance.   Respiratory:  Negative for choking, chest tightness and wheezing.    Cardiovascular:  Negative for chest pain and palpitations.   Gastrointestinal:  Negative for blood in stool, constipation, diarrhea and vomiting.   Endocrine: Negative for polydipsia and polyuria.   Genitourinary:  Negative for difficulty urinating, hematuria and urgency.   Musculoskeletal:  Negative for arthralgias, joint swelling and neck pain.   Skin:  Negative for pallor.   Neurological:  Negative for facial asymmetry, weakness and headaches.   Psychiatric/Behavioral:  Negative for confusion and dysphoric mood.      Objective:     Wt Readings from Last 3 Encounters:   10/11/22 101.6 kg (223 lb 15.8 oz)   09/28/22 102.8 kg (226 lb 10.1 oz)   09/16/22 105.8 kg (233 lb 4 oz)     BP Readings from Last 3 Encounters:   10/11/22 117/71   09/28/22 130/72   09/16/22 132/72       Lab Results   Component Value Date    WBC 10.35 09/16/2022    HGB 7.6 (L) 09/16/2022    HCT 22.8 (L) 09/16/2022     09/16/2022     (L) 09/15/2022    K 3.5 09/16/2022     09/15/2022    ALT 11  "09/15/2022    AST 29 09/15/2022    CO2 29 09/15/2022    CREATININE 0.9 09/15/2022    BUN 10 09/15/2022    PSA 0.84 10/11/2022     09/15/2022      Hemoglobin A1C   Date Value Ref Range Status   09/08/2022 5.4 4.0 - 5.6 % Final     Comment:     ADA Screening Guidelines:  5.7-6.4%  Consistent with prediabetes  >or=6.5%  Consistent with diabetes    High levels of fetal hemoglobin interfere with the HbA1C  assay. Heterozygous hemoglobin variants (HbS, HgC, etc)do  not significantly interfere with this assay.   However, presence of multiple variants may affect accuracy.       Hemoglobin A1c   Date Value Ref Range Status   06/10/2022 5.4 4.8 - 5.6 % Final     Comment:              Prediabetes: 5.7 - 6.4           Diabetes: >6.4           Glycemic control for adults with diabetes: <7.0        Lab Results   Component Value Date    TSH 1.370 06/10/2022     No results found for: FREET4  Lab Results   Component Value Date    LDLCALC 77 02/04/2022    LDLCALC 78 08/13/2021     Lab Results   Component Value Date    TRIG 61 02/04/2022    TRIG 54 08/13/2021            Physical Exam  Constitutional:       Appearance: Normal appearance.   HENT:      Head: Normocephalic and atraumatic.   Eyes:      Extraocular Movements: Extraocular movements intact.      Conjunctiva/sclera: Conjunctivae normal.      Pupils: Pupils are equal, round, and reactive to light.   Pulmonary:      Effort: Pulmonary effort is normal.   Neurological:      Mental Status: He is alert and oriented to person, place, and time.   Psychiatric:         Mood and Affect: Mood normal.       Medication List with Changes/Refills   Current Medications    ASPIRIN (ECOTRIN) 81 MG EC TABLET    Take 1 tablet (81 mg total) by mouth once daily.    DILTIAZEM  MG CP12    Take 1 capsule by mouth 2 (two) times daily.    ESOMEPRAZOLE (NEXIUM) 20 MG CAPSULE        INSULIN SYRINGE-NEEDLE U-100 (BD INSULIN SYRINGE) 1 ML 25 X 1" SYRG    INJECT 1 SYRINGE AS DIRECTED EVERY 7 " "DAYS    ROSUVASTATIN (CRESTOR) 10 MG TABLET    Take 1 tablet (10 mg total) by mouth once daily.    SYRINGE WITH NEEDLE (BD LUER-KLAUDIA SYRINGE) 3 ML 25 GAUGE X 1" SYRG    BD Luer-Klaudia Syringe 3 mL 25 gauge x 1"   ONE SYRINGE AS DIRECTED EVERY 7 DAYS    SYRINGE WITH NEEDLE 1 ML 25 GAUGE X 1" SYRG    Inject 1 Syringe as directed every 7 days. Disp 1 box    TESTOSTERONE CYPIONATE (DEPOTESTOTERONE CYPIONATE) 200 MG/ML INJECTION    Inject 0.5 mLs (100 mg total) into the muscle every 7 days. 10 ml vial requested if covered by insurance    WARFARIN (COUMADIN) 5 MG TABLET    Take 1 tablet (5 mg total) by mouth Daily.    ZOLPIDEM (AMBIEN) 10 MG TAB    TAKE 1 TABLET(10 MG) BY MOUTH EVERY NIGHT AS NEEDED   Changed and/or Refilled Medications    Modified Medication Previous Medication    ESCITALOPRAM OXALATE (LEXAPRO) 10 MG TABLET EScitalopram oxalate (LEXAPRO) 5 MG Tab       Take 1 tablet (10 mg total) by mouth once daily.    Take 1 tablet (5 mg total) by mouth once daily.   Discontinued Medications    AMLODIPINE (NORVASC) 10 MG TABLET    Take 1 tablet (10 mg total) by mouth once daily.    K PHOS DI & MONO-SOD PHOS MONO (K-PHOS-NEUTRAL) 250 MG TAB    K-Phos-Neutral 250 mg tablet   TAKE 2 TABLETS BY MOUTH TWICE DAILY FOR 5 DAYS    LOSARTAN (COZAAR) 100 MG TABLET    Take 100 mg by mouth.    LOSARTAN (COZAAR) 25 MG TABLET    losartan 25 mg tablet   TAKE 1 TABLET BY MOUTH EVERY DAY    METHOCARBAMOL (ROBAXIN) 500 MG TAB    methocarbamol 500 mg tablet       "

## 2023-04-21 ENCOUNTER — LAB VISIT (OUTPATIENT)
Dept: LAB | Facility: HOSPITAL | Age: 48
End: 2023-04-21
Attending: INTERNAL MEDICINE
Payer: COMMERCIAL

## 2023-04-21 DIAGNOSIS — E78.2 MIXED HYPERLIPIDEMIA: ICD-10-CM

## 2023-04-21 DIAGNOSIS — D64.9 ANEMIA, UNSPECIFIED TYPE: ICD-10-CM

## 2023-04-21 DIAGNOSIS — E29.1 MALE HYPOGONADISM: ICD-10-CM

## 2023-04-21 DIAGNOSIS — Z01.89 ENCOUNTER FOR ROUTINE LABORATORY TESTING: ICD-10-CM

## 2023-04-21 DIAGNOSIS — Z12.5 SCREENING PSA (PROSTATE SPECIFIC ANTIGEN): ICD-10-CM

## 2023-04-21 LAB
ALBUMIN SERPL BCP-MCNC: 4.3 G/DL (ref 3.5–5.2)
ALP SERPL-CCNC: 64 U/L (ref 55–135)
ALT SERPL W/O P-5'-P-CCNC: 21 U/L (ref 10–44)
ANION GAP SERPL CALC-SCNC: 9 MMOL/L (ref 8–16)
AST SERPL-CCNC: 27 U/L (ref 10–40)
BILIRUB SERPL-MCNC: 0.7 MG/DL (ref 0.1–1)
BUN SERPL-MCNC: 15 MG/DL (ref 6–20)
CALCIUM SERPL-MCNC: 9 MG/DL (ref 8.7–10.5)
CHLORIDE SERPL-SCNC: 104 MMOL/L (ref 95–110)
CHOLEST SERPL-MCNC: 151 MG/DL (ref 120–199)
CHOLEST/HDLC SERPL: 3.1 {RATIO} (ref 2–5)
CO2 SERPL-SCNC: 25 MMOL/L (ref 23–29)
COMPLEXED PSA SERPL-MCNC: 0.6 NG/ML (ref 0–4)
CREAT SERPL-MCNC: 1 MG/DL (ref 0.5–1.4)
ERYTHROCYTE [DISTWIDTH] IN BLOOD BY AUTOMATED COUNT: 19.5 % (ref 11.5–14.5)
EST. GFR  (NO RACE VARIABLE): >60 ML/MIN/1.73 M^2
GLUCOSE SERPL-MCNC: 85 MG/DL (ref 70–110)
HCT VFR BLD AUTO: 42.9 % (ref 40–54)
HDLC SERPL-MCNC: 49 MG/DL (ref 40–75)
HDLC SERPL: 32.5 % (ref 20–50)
HGB BLD-MCNC: 12.7 G/DL (ref 14–18)
IRON SERPL-MCNC: 33 UG/DL (ref 45–160)
LDLC SERPL CALC-MCNC: 85.8 MG/DL (ref 63–159)
MCH RBC QN AUTO: 22.1 PG (ref 27–31)
MCHC RBC AUTO-ENTMCNC: 29.6 G/DL (ref 32–36)
MCV RBC AUTO: 75 FL (ref 82–98)
NONHDLC SERPL-MCNC: 102 MG/DL
PLATELET # BLD AUTO: 252 K/UL (ref 150–450)
PMV BLD AUTO: 9.5 FL (ref 9.2–12.9)
POTASSIUM SERPL-SCNC: 4.7 MMOL/L (ref 3.5–5.1)
PROT SERPL-MCNC: 7.5 G/DL (ref 6–8.4)
RBC # BLD AUTO: 5.75 M/UL (ref 4.6–6.2)
SATURATED IRON: 7 % (ref 20–50)
SODIUM SERPL-SCNC: 138 MMOL/L (ref 136–145)
TESTOST SERPL-MCNC: 1014 NG/DL (ref 304–1227)
TOTAL IRON BINDING CAPACITY: 480 UG/DL (ref 250–450)
TRANSFERRIN SERPL-MCNC: 324 MG/DL (ref 200–375)
TRIGL SERPL-MCNC: 81 MG/DL (ref 30–150)
TSH SERPL DL<=0.005 MIU/L-ACNC: 1.34 UIU/ML (ref 0.4–4)
WBC # BLD AUTO: 7.74 K/UL (ref 3.9–12.7)

## 2023-04-21 PROCEDURE — 84153 ASSAY OF PSA TOTAL: CPT | Performed by: INTERNAL MEDICINE

## 2023-04-21 PROCEDURE — 84443 ASSAY THYROID STIM HORMONE: CPT | Performed by: INTERNAL MEDICINE

## 2023-04-21 PROCEDURE — 85027 COMPLETE CBC AUTOMATED: CPT | Performed by: INTERNAL MEDICINE

## 2023-04-21 PROCEDURE — 84466 ASSAY OF TRANSFERRIN: CPT | Performed by: INTERNAL MEDICINE

## 2023-04-21 PROCEDURE — 80053 COMPREHEN METABOLIC PANEL: CPT | Performed by: INTERNAL MEDICINE

## 2023-04-21 PROCEDURE — 36415 COLL VENOUS BLD VENIPUNCTURE: CPT | Mod: PN | Performed by: INTERNAL MEDICINE

## 2023-04-21 PROCEDURE — 80061 LIPID PANEL: CPT | Performed by: INTERNAL MEDICINE

## 2023-04-21 PROCEDURE — 84403 ASSAY OF TOTAL TESTOSTERONE: CPT | Performed by: INTERNAL MEDICINE

## 2023-04-28 DIAGNOSIS — D50.9 IRON DEFICIENCY ANEMIA, UNSPECIFIED IRON DEFICIENCY ANEMIA TYPE: Primary | ICD-10-CM

## 2023-04-28 DIAGNOSIS — Z00.00 WELLNESS EXAMINATION: ICD-10-CM

## 2023-05-02 ENCOUNTER — PATIENT MESSAGE (OUTPATIENT)
Dept: FAMILY MEDICINE | Facility: CLINIC | Age: 48
End: 2023-05-02
Payer: COMMERCIAL

## 2023-05-02 DIAGNOSIS — E29.1 MALE HYPOGONADISM: ICD-10-CM

## 2023-05-02 RX ORDER — TESTOSTERONE CYPIONATE 200 MG/ML
100 INJECTION, SOLUTION INTRAMUSCULAR
Qty: 10 ML | Refills: 1 | Status: SHIPPED | OUTPATIENT
Start: 2023-05-02 | End: 2023-05-04

## 2023-05-02 NOTE — TELEPHONE ENCOUNTER
No care due was identified.  [facility] Embedded Care Due Messages. Reference number: [id number].   5/02/2023 12:55:42 PM CDT

## 2023-05-03 ENCOUNTER — PATIENT MESSAGE (OUTPATIENT)
Dept: FAMILY MEDICINE | Facility: CLINIC | Age: 48
End: 2023-05-03
Payer: COMMERCIAL

## 2023-05-03 DIAGNOSIS — E29.1 MALE HYPOGONADISM: ICD-10-CM

## 2023-05-04 RX ORDER — TESTOSTERONE CYPIONATE 1000 MG/10ML
100 INJECTION, SOLUTION INTRAMUSCULAR
Qty: 12 ML | Refills: 1 | Status: SHIPPED | OUTPATIENT
Start: 2023-05-04 | End: 2023-12-14 | Stop reason: SDUPTHER

## 2023-06-19 ENCOUNTER — LAB VISIT (OUTPATIENT)
Dept: LAB | Facility: HOSPITAL | Age: 48
End: 2023-06-19
Attending: NURSE PRACTITIONER
Payer: COMMERCIAL

## 2023-06-19 DIAGNOSIS — D62 ACUTE BLOOD LOSS ANEMIA: ICD-10-CM

## 2023-06-19 LAB
ALBUMIN SERPL BCP-MCNC: 4.2 G/DL (ref 3.5–5.2)
ALP SERPL-CCNC: 59 U/L (ref 55–135)
ALT SERPL W/O P-5'-P-CCNC: 19 U/L (ref 10–44)
ANION GAP SERPL CALC-SCNC: 11 MMOL/L (ref 8–16)
AST SERPL-CCNC: 24 U/L (ref 10–40)
BASOPHILS # BLD AUTO: 0.09 K/UL (ref 0–0.2)
BASOPHILS NFR BLD: 1.4 % (ref 0–1.9)
BILIRUB SERPL-MCNC: 0.8 MG/DL (ref 0.1–1)
BUN SERPL-MCNC: 10 MG/DL (ref 6–20)
CALCIUM SERPL-MCNC: 8.7 MG/DL (ref 8.7–10.5)
CHLORIDE SERPL-SCNC: 106 MMOL/L (ref 95–110)
CO2 SERPL-SCNC: 24 MMOL/L (ref 23–29)
CREAT SERPL-MCNC: 1 MG/DL (ref 0.5–1.4)
DIFFERENTIAL METHOD: ABNORMAL
EOSINOPHIL # BLD AUTO: 0.3 K/UL (ref 0–0.5)
EOSINOPHIL NFR BLD: 3.8 % (ref 0–8)
ERYTHROCYTE [DISTWIDTH] IN BLOOD BY AUTOMATED COUNT: 23.5 % (ref 11.5–14.5)
EST. GFR  (NO RACE VARIABLE): >60 ML/MIN/1.73 M^2
FERRITIN SERPL-MCNC: 48 NG/ML (ref 20–300)
GLUCOSE SERPL-MCNC: 73 MG/DL (ref 70–110)
HCT VFR BLD AUTO: 47 % (ref 40–54)
HGB BLD-MCNC: 15.3 G/DL (ref 14–18)
IMM GRANULOCYTES # BLD AUTO: 0.02 K/UL (ref 0–0.04)
IMM GRANULOCYTES NFR BLD AUTO: 0.3 % (ref 0–0.5)
IRON SERPL-MCNC: 76 UG/DL (ref 45–160)
LYMPHOCYTES # BLD AUTO: 1.9 K/UL (ref 1–4.8)
LYMPHOCYTES NFR BLD: 28.4 % (ref 18–48)
MCH RBC QN AUTO: 26.3 PG (ref 27–31)
MCHC RBC AUTO-ENTMCNC: 32.6 G/DL (ref 32–36)
MCV RBC AUTO: 81 FL (ref 82–98)
MONOCYTES # BLD AUTO: 0.7 K/UL (ref 0.3–1)
MONOCYTES NFR BLD: 10.3 % (ref 4–15)
NEUTROPHILS # BLD AUTO: 3.7 K/UL (ref 1.8–7.7)
NEUTROPHILS NFR BLD: 55.8 % (ref 38–73)
NRBC BLD-RTO: 0 /100 WBC
PLATELET # BLD AUTO: 234 K/UL (ref 150–450)
PMV BLD AUTO: 9.9 FL (ref 9.2–12.9)
POTASSIUM SERPL-SCNC: 4.3 MMOL/L (ref 3.5–5.1)
PROT SERPL-MCNC: 7.1 G/DL (ref 6–8.4)
RBC # BLD AUTO: 5.81 M/UL (ref 4.6–6.2)
SATURATED IRON: 18 % (ref 20–50)
SODIUM SERPL-SCNC: 141 MMOL/L (ref 136–145)
TOTAL IRON BINDING CAPACITY: 419 UG/DL (ref 250–450)
TRANSFERRIN SERPL-MCNC: 283 MG/DL (ref 200–375)
WBC # BLD AUTO: 6.61 K/UL (ref 3.9–12.7)

## 2023-06-19 PROCEDURE — 36415 COLL VENOUS BLD VENIPUNCTURE: CPT | Mod: PN | Performed by: NURSE PRACTITIONER

## 2023-06-19 PROCEDURE — 82728 ASSAY OF FERRITIN: CPT | Performed by: NURSE PRACTITIONER

## 2023-06-19 PROCEDURE — 80053 COMPREHEN METABOLIC PANEL: CPT | Performed by: NURSE PRACTITIONER

## 2023-06-19 PROCEDURE — 85025 COMPLETE CBC W/AUTO DIFF WBC: CPT | Performed by: NURSE PRACTITIONER

## 2023-06-19 PROCEDURE — 84466 ASSAY OF TRANSFERRIN: CPT | Performed by: NURSE PRACTITIONER

## 2023-09-08 ENCOUNTER — LAB VISIT (OUTPATIENT)
Dept: LAB | Facility: HOSPITAL | Age: 48
End: 2023-09-08
Attending: PHYSICIAN ASSISTANT
Payer: COMMERCIAL

## 2023-09-08 DIAGNOSIS — D50.9 IRON DEFICIENCY ANEMIA, UNSPECIFIED IRON DEFICIENCY ANEMIA TYPE: ICD-10-CM

## 2023-09-08 LAB
ALBUMIN SERPL BCP-MCNC: 4.2 G/DL (ref 3.5–5.2)
ALP SERPL-CCNC: 52 U/L (ref 55–135)
ALT SERPL W/O P-5'-P-CCNC: 25 U/L (ref 10–44)
ANION GAP SERPL CALC-SCNC: 10 MMOL/L (ref 8–16)
AST SERPL-CCNC: 28 U/L (ref 10–40)
BASOPHILS # BLD AUTO: 0.06 K/UL (ref 0–0.2)
BASOPHILS NFR BLD: 0.7 % (ref 0–1.9)
BILIRUB SERPL-MCNC: 0.9 MG/DL (ref 0.1–1)
BUN SERPL-MCNC: 15 MG/DL (ref 6–20)
CALCIUM SERPL-MCNC: 9.2 MG/DL (ref 8.7–10.5)
CHLORIDE SERPL-SCNC: 105 MMOL/L (ref 95–110)
CO2 SERPL-SCNC: 26 MMOL/L (ref 23–29)
CREAT SERPL-MCNC: 1.2 MG/DL (ref 0.5–1.4)
DIFFERENTIAL METHOD: ABNORMAL
EOSINOPHIL # BLD AUTO: 0.1 K/UL (ref 0–0.5)
EOSINOPHIL NFR BLD: 1.7 % (ref 0–8)
ERYTHROCYTE [DISTWIDTH] IN BLOOD BY AUTOMATED COUNT: 16 % (ref 11.5–14.5)
EST. GFR  (NO RACE VARIABLE): >60 ML/MIN/1.73 M^2
FERRITIN SERPL-MCNC: 29 NG/ML (ref 20–300)
GLUCOSE SERPL-MCNC: 77 MG/DL (ref 70–110)
HCT VFR BLD AUTO: 47.5 % (ref 40–54)
HGB BLD-MCNC: 16 G/DL (ref 14–18)
IMM GRANULOCYTES # BLD AUTO: 0.04 K/UL (ref 0–0.04)
IMM GRANULOCYTES NFR BLD AUTO: 0.5 % (ref 0–0.5)
IRON SERPL-MCNC: 112 UG/DL (ref 45–160)
LYMPHOCYTES # BLD AUTO: 1.8 K/UL (ref 1–4.8)
LYMPHOCYTES NFR BLD: 22.2 % (ref 18–48)
MCH RBC QN AUTO: 29.3 PG (ref 27–31)
MCHC RBC AUTO-ENTMCNC: 33.7 G/DL (ref 32–36)
MCV RBC AUTO: 87 FL (ref 82–98)
MONOCYTES # BLD AUTO: 0.7 K/UL (ref 0.3–1)
MONOCYTES NFR BLD: 8.1 % (ref 4–15)
NEUTROPHILS # BLD AUTO: 5.4 K/UL (ref 1.8–7.7)
NEUTROPHILS NFR BLD: 66.8 % (ref 38–73)
NRBC BLD-RTO: 0 /100 WBC
PLATELET # BLD AUTO: 219 K/UL (ref 150–450)
PMV BLD AUTO: 10.4 FL (ref 9.2–12.9)
POTASSIUM SERPL-SCNC: 4.6 MMOL/L (ref 3.5–5.1)
PROT SERPL-MCNC: 7.2 G/DL (ref 6–8.4)
RBC # BLD AUTO: 5.46 M/UL (ref 4.6–6.2)
SATURATED IRON: 26 % (ref 20–50)
SODIUM SERPL-SCNC: 141 MMOL/L (ref 136–145)
TOTAL IRON BINDING CAPACITY: 438 UG/DL (ref 250–450)
TRANSFERRIN SERPL-MCNC: 296 MG/DL (ref 200–375)
WBC # BLD AUTO: 8.15 K/UL (ref 3.9–12.7)

## 2023-09-08 PROCEDURE — 82728 ASSAY OF FERRITIN: CPT | Performed by: PHYSICIAN ASSISTANT

## 2023-09-08 PROCEDURE — 36415 COLL VENOUS BLD VENIPUNCTURE: CPT | Mod: PN | Performed by: PHYSICIAN ASSISTANT

## 2023-09-08 PROCEDURE — 83540 ASSAY OF IRON: CPT | Performed by: PHYSICIAN ASSISTANT

## 2023-09-08 PROCEDURE — 85025 COMPLETE CBC W/AUTO DIFF WBC: CPT | Performed by: PHYSICIAN ASSISTANT

## 2023-09-08 PROCEDURE — 80053 COMPREHEN METABOLIC PANEL: CPT | Performed by: PHYSICIAN ASSISTANT

## 2023-09-08 PROCEDURE — 84466 ASSAY OF TRANSFERRIN: CPT | Performed by: PHYSICIAN ASSISTANT

## 2023-10-28 DIAGNOSIS — E78.2 MIXED HYPERLIPIDEMIA: ICD-10-CM

## 2023-10-29 RX ORDER — ROSUVASTATIN CALCIUM 10 MG/1
10 TABLET, COATED ORAL
Qty: 90 TABLET | Refills: 1 | Status: SHIPPED | OUTPATIENT
Start: 2023-10-29

## 2023-10-29 NOTE — TELEPHONE ENCOUNTER
Refill Decision Note   Aldenjesus Ledbetter  is requesting a refill authorization.  Brief Assessment and Rationale for Refill:  Approve     Medication Therapy Plan:         Comments:     Note composed:8:38 AM 10/29/2023

## 2023-10-29 NOTE — TELEPHONE ENCOUNTER
No care due was identified.  [facility] Embedded Care Due Messages. Reference number: [id number].   10/28/2023 9:42:30 PM CDT

## 2023-11-30 ENCOUNTER — PATIENT MESSAGE (OUTPATIENT)
Dept: FAMILY MEDICINE | Facility: CLINIC | Age: 48
End: 2023-11-30
Payer: COMMERCIAL

## 2023-11-30 DIAGNOSIS — E29.1 MALE HYPOGONADISM: ICD-10-CM

## 2023-11-30 DIAGNOSIS — E78.2 MIXED HYPERLIPIDEMIA: ICD-10-CM

## 2023-11-30 DIAGNOSIS — Z00.00 WELLNESS EXAMINATION: Primary | ICD-10-CM

## 2023-12-08 ENCOUNTER — LAB VISIT (OUTPATIENT)
Dept: LAB | Facility: HOSPITAL | Age: 48
End: 2023-12-08
Attending: INTERNAL MEDICINE
Payer: COMMERCIAL

## 2023-12-08 DIAGNOSIS — E29.1 MALE HYPOGONADISM: ICD-10-CM

## 2023-12-08 DIAGNOSIS — E78.2 MIXED HYPERLIPIDEMIA: ICD-10-CM

## 2023-12-08 DIAGNOSIS — Z00.00 WELLNESS EXAMINATION: ICD-10-CM

## 2023-12-08 LAB
ALBUMIN SERPL BCP-MCNC: 4.2 G/DL (ref 3.5–5.2)
ALP SERPL-CCNC: 54 U/L (ref 55–135)
ALT SERPL W/O P-5'-P-CCNC: 29 U/L (ref 10–44)
ANION GAP SERPL CALC-SCNC: 11 MMOL/L (ref 8–16)
AST SERPL-CCNC: 29 U/L (ref 10–40)
BILIRUB SERPL-MCNC: 0.9 MG/DL (ref 0.1–1)
BUN SERPL-MCNC: 13 MG/DL (ref 6–20)
CALCIUM SERPL-MCNC: 9 MG/DL (ref 8.7–10.5)
CHLORIDE SERPL-SCNC: 101 MMOL/L (ref 95–110)
CHOLEST SERPL-MCNC: 151 MG/DL (ref 120–199)
CHOLEST/HDLC SERPL: 3.4 {RATIO} (ref 2–5)
CO2 SERPL-SCNC: 28 MMOL/L (ref 23–29)
CREAT SERPL-MCNC: 1.1 MG/DL (ref 0.5–1.4)
ERYTHROCYTE [DISTWIDTH] IN BLOOD BY AUTOMATED COUNT: 14.2 % (ref 11.5–14.5)
EST. GFR  (NO RACE VARIABLE): >60 ML/MIN/1.73 M^2
GLUCOSE SERPL-MCNC: 96 MG/DL (ref 70–110)
HCT VFR BLD AUTO: 50.1 % (ref 40–54)
HDLC SERPL-MCNC: 44 MG/DL (ref 40–75)
HDLC SERPL: 29.1 % (ref 20–50)
HGB BLD-MCNC: 16.9 G/DL (ref 14–18)
LDLC SERPL CALC-MCNC: 92.2 MG/DL (ref 63–159)
MCH RBC QN AUTO: 30.1 PG (ref 27–31)
MCHC RBC AUTO-ENTMCNC: 33.7 G/DL (ref 32–36)
MCV RBC AUTO: 89 FL (ref 82–98)
NONHDLC SERPL-MCNC: 107 MG/DL
PLATELET # BLD AUTO: 208 K/UL (ref 150–450)
PMV BLD AUTO: 10.3 FL (ref 9.2–12.9)
POTASSIUM SERPL-SCNC: 3.9 MMOL/L (ref 3.5–5.1)
PROT SERPL-MCNC: 7.2 G/DL (ref 6–8.4)
RBC # BLD AUTO: 5.62 M/UL (ref 4.6–6.2)
SODIUM SERPL-SCNC: 140 MMOL/L (ref 136–145)
TRIGL SERPL-MCNC: 74 MG/DL (ref 30–150)
WBC # BLD AUTO: 6.11 K/UL (ref 3.9–12.7)

## 2023-12-08 PROCEDURE — 80061 LIPID PANEL: CPT | Performed by: INTERNAL MEDICINE

## 2023-12-08 PROCEDURE — 80053 COMPREHEN METABOLIC PANEL: CPT | Performed by: INTERNAL MEDICINE

## 2023-12-08 PROCEDURE — 36415 COLL VENOUS BLD VENIPUNCTURE: CPT | Mod: PN | Performed by: INTERNAL MEDICINE

## 2023-12-08 PROCEDURE — 85027 COMPLETE CBC AUTOMATED: CPT | Performed by: INTERNAL MEDICINE

## 2023-12-12 ENCOUNTER — PATIENT MESSAGE (OUTPATIENT)
Dept: FAMILY MEDICINE | Facility: CLINIC | Age: 48
End: 2023-12-12
Payer: COMMERCIAL

## 2023-12-14 ENCOUNTER — OFFICE VISIT (OUTPATIENT)
Dept: FAMILY MEDICINE | Facility: CLINIC | Age: 48
End: 2023-12-14
Payer: COMMERCIAL

## 2023-12-14 VITALS
HEART RATE: 78 BPM | DIASTOLIC BLOOD PRESSURE: 82 MMHG | HEIGHT: 76 IN | RESPIRATION RATE: 16 BRPM | BODY MASS INDEX: 29.02 KG/M2 | WEIGHT: 238.31 LBS | SYSTOLIC BLOOD PRESSURE: 138 MMHG

## 2023-12-14 DIAGNOSIS — D50.9 IRON DEFICIENCY ANEMIA, UNSPECIFIED IRON DEFICIENCY ANEMIA TYPE: ICD-10-CM

## 2023-12-14 DIAGNOSIS — Z79.01 WARFARIN ANTICOAGULATION: ICD-10-CM

## 2023-12-14 DIAGNOSIS — Z00.00 WELLNESS EXAMINATION: Primary | ICD-10-CM

## 2023-12-14 DIAGNOSIS — Z95.2 H/O HEART VALVE REPLACEMENT WITH MECHANICAL VALVE: ICD-10-CM

## 2023-12-14 DIAGNOSIS — I10 ESSENTIAL HYPERTENSION: ICD-10-CM

## 2023-12-14 DIAGNOSIS — E29.1 MALE HYPOGONADISM: ICD-10-CM

## 2023-12-14 DIAGNOSIS — F41.9 ANXIETY: ICD-10-CM

## 2023-12-14 DIAGNOSIS — Z12.5 SCREENING PSA (PROSTATE SPECIFIC ANTIGEN): ICD-10-CM

## 2023-12-14 DIAGNOSIS — E78.2 MIXED HYPERLIPIDEMIA: ICD-10-CM

## 2023-12-14 PROCEDURE — 3075F SYST BP GE 130 - 139MM HG: CPT | Mod: CPTII,S$GLB,, | Performed by: INTERNAL MEDICINE

## 2023-12-14 PROCEDURE — 1159F PR MEDICATION LIST DOCUMENTED IN MEDICAL RECORD: ICD-10-PCS | Mod: CPTII,S$GLB,, | Performed by: INTERNAL MEDICINE

## 2023-12-14 PROCEDURE — 3079F DIAST BP 80-89 MM HG: CPT | Mod: CPTII,S$GLB,, | Performed by: INTERNAL MEDICINE

## 2023-12-14 PROCEDURE — 1159F MED LIST DOCD IN RCRD: CPT | Mod: CPTII,S$GLB,, | Performed by: INTERNAL MEDICINE

## 2023-12-14 PROCEDURE — 99396 PR PREVENTIVE VISIT,EST,40-64: ICD-10-PCS | Mod: S$GLB,,, | Performed by: INTERNAL MEDICINE

## 2023-12-14 PROCEDURE — 3008F PR BODY MASS INDEX (BMI) DOCUMENTED: ICD-10-PCS | Mod: CPTII,S$GLB,, | Performed by: INTERNAL MEDICINE

## 2023-12-14 PROCEDURE — 4010F PR ACE/ARB THEARPY RXD/TAKEN: ICD-10-PCS | Mod: CPTII,S$GLB,, | Performed by: INTERNAL MEDICINE

## 2023-12-14 PROCEDURE — 99396 PREV VISIT EST AGE 40-64: CPT | Mod: S$GLB,,, | Performed by: INTERNAL MEDICINE

## 2023-12-14 PROCEDURE — 3008F BODY MASS INDEX DOCD: CPT | Mod: CPTII,S$GLB,, | Performed by: INTERNAL MEDICINE

## 2023-12-14 PROCEDURE — 3079F PR MOST RECENT DIASTOLIC BLOOD PRESSURE 80-89 MM HG: ICD-10-PCS | Mod: CPTII,S$GLB,, | Performed by: INTERNAL MEDICINE

## 2023-12-14 PROCEDURE — 1160F RVW MEDS BY RX/DR IN RCRD: CPT | Mod: CPTII,S$GLB,, | Performed by: INTERNAL MEDICINE

## 2023-12-14 PROCEDURE — 3075F PR MOST RECENT SYSTOLIC BLOOD PRESS GE 130-139MM HG: ICD-10-PCS | Mod: CPTII,S$GLB,, | Performed by: INTERNAL MEDICINE

## 2023-12-14 PROCEDURE — 4010F ACE/ARB THERAPY RXD/TAKEN: CPT | Mod: CPTII,S$GLB,, | Performed by: INTERNAL MEDICINE

## 2023-12-14 PROCEDURE — 1160F PR REVIEW ALL MEDS BY PRESCRIBER/CLIN PHARMACIST DOCUMENTED: ICD-10-PCS | Mod: CPTII,S$GLB,, | Performed by: INTERNAL MEDICINE

## 2023-12-14 PROCEDURE — 99999 PR PBB SHADOW E&M-EST. PATIENT-LVL III: ICD-10-PCS | Mod: PBBFAC,,, | Performed by: INTERNAL MEDICINE

## 2023-12-14 PROCEDURE — 99999 PR PBB SHADOW E&M-EST. PATIENT-LVL III: CPT | Mod: PBBFAC,,, | Performed by: INTERNAL MEDICINE

## 2023-12-14 RX ORDER — AMLODIPINE BESYLATE 10 MG/1
10 TABLET ORAL DAILY
Qty: 90 TABLET | Refills: 2 | Status: SHIPPED | OUTPATIENT
Start: 2023-12-14 | End: 2024-02-23

## 2023-12-14 RX ORDER — LOSARTAN POTASSIUM 100 MG/1
100 TABLET ORAL DAILY
COMMUNITY
End: 2024-02-23

## 2023-12-14 RX ORDER — TESTOSTERONE CYPIONATE 1000 MG/10ML
100 INJECTION, SOLUTION INTRAMUSCULAR
Qty: 12 ML | Refills: 1 | Status: SHIPPED | OUTPATIENT
Start: 2023-12-14 | End: 2024-03-07 | Stop reason: SDUPTHER

## 2023-12-14 RX ORDER — CHLORTHALIDONE 25 MG/1
25 TABLET ORAL 2 TIMES DAILY
Qty: 60 TABLET | Refills: 3 | Status: SHIPPED | OUTPATIENT
Start: 2023-12-14 | End: 2024-03-07

## 2023-12-14 RX ORDER — AMLODIPINE BESYLATE 10 MG/1
10 TABLET ORAL DAILY
COMMUNITY
End: 2023-12-14 | Stop reason: SDUPTHER

## 2023-12-14 RX ORDER — SEMAGLUTIDE 0.25 MG/.5ML
0.25 INJECTION, SOLUTION SUBCUTANEOUS
Qty: 4 EACH | Refills: 0 | Status: SHIPPED | OUTPATIENT
Start: 2023-12-14 | End: 2024-02-27

## 2023-12-14 RX ORDER — HYDROCHLOROTHIAZIDE 50 MG/1
50 TABLET ORAL DAILY
COMMUNITY
End: 2023-12-14

## 2023-12-14 NOTE — PROGRESS NOTES
Subjective:       Patient ID: Alden Ledbetter is a 48 y.o. male.    Chief Complaint: Follow-up and Annual Exam   Follow-up  Pertinent negatives include no chest pain, chills or joint swelling.       HTN: uncontrolled Hctz 50 not working well.     PSA: 0.6 (4/23   Colonoscopy: Dr Arzate 2022 normal   Immunizations: Flu: yes Tdap: 2018   Covid: yes   Smoker: never      Wellness      Bicuspid AV w moderate AR, ascending aortic aneurysm:   Rx life long Warfarin.  s/p Aortic root replacement (Bentall procedure) w mechanical valve.  Monitors INR at home.  Cardio Dr Carlton      HTN: more elevated Rx Losartan 100, Hctz 25, Norvasc 10   Off diltiazem 120 twice daily.               Metoprolol for fatigue      HLD: controlled Rx Crestor 10      Male hypogonadism: controlled dx approx 2018// Rx Testosterone cypionate injection 0.5 weekly.    Improved energy, less fatigue.  Is finding gaining weight.  Previously Axiron 2 // T 918 1 day after shot.  (Prev starting low levels labs 284 & 3.4 and 194 & T 5.7)      Anxiety:  Worse p heart valve replacement.  Rx lexapro 10 mg. ? Weight gain     GERD: controlled Rx Nexium 20 OTC     Glucose intolerance:  Glucose 102-- previous 100.       Insomnia: on off issues Rx Ambien 10     Metabolic syndrome 29 / 238 see if shot covered   ____________________________________________________________________________________________________  Assessment & Plan:  1. Wellness examination  - CBC Without Differential; Future  - Comprehensive Metabolic Panel; Future  - Lipid Panel; Future  - TSH; Future  - Iron and TIBC; Future  - PSA, Screening; Future    2. Essential hypertension  - amLODIPine (NORVASC) 10 MG tablet; Take 1 tablet (10 mg total) by mouth once daily.  Dispense: 90 tablet; Refill: 2  - chlorthalidone (HYGROTEN) 25 MG Tab; Take 1 tablet (25 mg total) by mouth 2 (two) times daily.  Dispense: 60 tablet; Refill: 3  - semaglutide, weight loss, (WEGOVY) 0.25 mg/0.5 mL PnIj; Inject 0.25 mg  into the skin every 7 days.  Dispense: 4 each; Refill: 0  - Comprehensive Metabolic Panel; Future  - TSH; Future    3. Male hypogonadism  - testosterone cypionate (DEPOTESTOTERONE CYPIONATE) 100 mg/mL injection; Inject 1 mL (100 mg total) into the muscle every 7 days.  Dispense: 12 mL; Refill: 1    4. Mixed hyperlipidemia  - semaglutide, weight loss, (WEGOVY) 0.25 mg/0.5 mL PnIj; Inject 0.25 mg into the skin every 7 days.  Dispense: 4 each; Refill: 0  - Lipid Panel; Future    5. Iron deficiency anemia, unspecified iron deficiency anemia type  - CBC Without Differential; Future  - Iron and TIBC; Future    6. Screening PSA (prostate specific antigen)  - PSA, Screening; Future    7. Anxiety    8. H/O heart valve replacement with mechanical valve    9. Warfarin anticoagulation     Wellness examination  -     CBC Without Differential; Future; Expected date: 12/14/2023  -     Comprehensive Metabolic Panel; Future; Expected date: 12/14/2023  -     Lipid Panel; Future; Expected date: 12/14/2023  -     TSH; Future; Expected date: 12/14/2023  -     Iron and TIBC; Future; Expected date: 12/14/2023  -     PSA, Screening; Future; Expected date: 12/14/2023    Essential hypertension  -     amLODIPine (NORVASC) 10 MG tablet; Take 1 tablet (10 mg total) by mouth once daily.  Dispense: 90 tablet; Refill: 2  -     chlorthalidone (HYGROTEN) 25 MG Tab; Take 1 tablet (25 mg total) by mouth 2 (two) times daily.  Dispense: 60 tablet; Refill: 3  -     semaglutide, weight loss, (WEGOVY) 0.25 mg/0.5 mL PnIj; Inject 0.25 mg into the skin every 7 days.  Dispense: 4 each; Refill: 0  -     Comprehensive Metabolic Panel; Future; Expected date: 12/14/2023  -     TSH; Future; Expected date: 12/14/2023    Male hypogonadism  -     testosterone cypionate (DEPOTESTOTERONE CYPIONATE) 100 mg/mL injection; Inject 1 mL (100 mg total) into the muscle every 7 days.  Dispense: 12 mL; Refill: 1    Mixed hyperlipidemia  -     semaglutide, weight loss, (WEGOVY)  0.25 mg/0.5 mL PnIj; Inject 0.25 mg into the skin every 7 days.  Dispense: 4 each; Refill: 0  -     Lipid Panel; Future; Expected date: 12/14/2023    Iron deficiency anemia, unspecified iron deficiency anemia type  Comments:  resolved  Orders:  -     CBC Without Differential; Future; Expected date: 12/14/2023  -     Iron and TIBC; Future; Expected date: 12/14/2023    Screening PSA (prostate specific antigen)  -     PSA, Screening; Future; Expected date: 12/14/2023    Anxiety    H/O heart valve replacement with mechanical valve    Warfarin anticoagulation        Continue to work on regular exercise, maintain healthy weight, balanced diet. Avoid unhealthy habits: smoking, excessive alcohol intake.     Disclaimer: This note was partly generated using dictation software which may occasionally result in transcription errors  ____________________________________________________________________________________________________  Review of Systems:  Review of Systems   Constitutional:  Negative for chills.   HENT:  Negative for drooling.    Eyes:  Negative for pain.   Respiratory:  Negative for choking.    Cardiovascular:  Negative for chest pain.   Gastrointestinal:  Negative for blood in stool.   Genitourinary:  Negative for hematuria.   Musculoskeletal:  Negative for joint swelling.   Skin:  Negative for pallor.   Neurological:  Negative for facial asymmetry.   Psychiatric/Behavioral:  Negative for confusion.        Objective:     Wt Readings from Last 3 Encounters:   12/14/23 108.1 kg (238 lb 5.1 oz)   10/11/22 101.6 kg (223 lb 15.8 oz)   09/28/22 102.8 kg (226 lb 10.1 oz)     BP Readings from Last 3 Encounters:   12/14/23 138/82   10/11/22 117/71   09/28/22 130/72       Lab Results   Component Value Date    WBC 6.11 12/08/2023    HGB 16.9 12/08/2023    HCT 50.1 12/08/2023     12/08/2023     12/08/2023    K 3.9 12/08/2023     12/08/2023    ALT 29 12/08/2023    AST 29 12/08/2023    CO2 28 12/08/2023     "CREATININE 1.1 12/08/2023    BUN 13 12/08/2023    PSA 0.60 04/21/2023    GLU 96 12/08/2023      Hemoglobin A1C   Date Value Ref Range Status   09/08/2022 5.4 4.0 - 5.6 % Final     Comment:     ADA Screening Guidelines:  5.7-6.4%  Consistent with prediabetes  >or=6.5%  Consistent with diabetes    High levels of fetal hemoglobin interfere with the HbA1C  assay. Heterozygous hemoglobin variants (HbS, HgC, etc)do  not significantly interfere with this assay.   However, presence of multiple variants may affect accuracy.       Hemoglobin A1c   Date Value Ref Range Status   06/10/2022 5.4 4.8 - 5.6 % Final     Comment:              Prediabetes: 5.7 - 6.4           Diabetes: >6.4           Glycemic control for adults with diabetes: <7.0        Lab Results   Component Value Date    TSH 1.345 04/21/2023    TSH 1.370 06/10/2022     No results found for: "FREET4"  Lab Results   Component Value Date    LDLCALC 92.2 12/08/2023    LDLCALC 85.8 04/21/2023    LDLCALC 77 02/04/2022     Lab Results   Component Value Date    TRIG 74 12/08/2023    TRIG 81 04/21/2023    TRIG 61 02/04/2022            Physical Exam  Constitutional:       Appearance: Normal appearance.   HENT:      Head: Normocephalic and atraumatic.   Eyes:      Extraocular Movements: Extraocular movements intact.      Conjunctiva/sclera: Conjunctivae normal.      Pupils: Pupils are equal, round, and reactive to light.   Cardiovascular:      Rate and Rhythm: Normal rate.   Pulmonary:      Effort: Pulmonary effort is normal.   Neurological:      Mental Status: He is alert and oriented to person, place, and time.   Psychiatric:         Mood and Affect: Mood normal.         Medication List with Changes/Refills   New Medications    CHLORTHALIDONE (HYGROTEN) 25 MG TAB    Take 1 tablet (25 mg total) by mouth 2 (two) times daily.    SEMAGLUTIDE, WEIGHT LOSS, (WEGOVY) 0.25 MG/0.5 ML PNIJ    Inject 0.25 mg into the skin every 7 days.   Current Medications    ASPIRIN (ECOTRIN) 81 MG " "EC TABLET    Take 1 tablet (81 mg total) by mouth once daily.    ESCITALOPRAM OXALATE (LEXAPRO) 10 MG TABLET    Take 1 tablet (10 mg total) by mouth once daily.    ESOMEPRAZOLE (NEXIUM) 20 MG CAPSULE        INSULIN SYRINGE-NEEDLE U-100 (BD INSULIN SYRINGE) 1 ML 25 X 1" SYRG    INJECT 1 SYRINGE AS DIRECTED EVERY 7 DAYS    LOSARTAN (COZAAR) 100 MG TABLET    Take 100 mg by mouth once daily.    ROSUVASTATIN (CRESTOR) 10 MG TABLET    TAKE 1 TABLET(10 MG) BY MOUTH EVERY DAY    SYRINGE WITH NEEDLE (BD LUER-KLAUDIA SYRINGE) 3 ML 25 GAUGE X 1" SYRG    BD Luer-Klaudia Syringe 3 mL 25 gauge x 1"   ONE SYRINGE AS DIRECTED EVERY 7 DAYS    SYRINGE WITH NEEDLE 1 ML 25 GAUGE X 1" SYRG    Inject 1 Syringe as directed every 7 days. Disp 1 box    WARFARIN (COUMADIN) 5 MG TABLET    Take 1 tablet (5 mg total) by mouth Daily.    ZOLPIDEM (AMBIEN) 10 MG TAB    TAKE 1 TABLET(10 MG) BY MOUTH EVERY NIGHT AS NEEDED   Changed and/or Refilled Medications    Modified Medication Previous Medication    AMLODIPINE (NORVASC) 10 MG TABLET amLODIPine (NORVASC) 10 MG tablet       Take 1 tablet (10 mg total) by mouth once daily.    Take 10 mg by mouth once daily.    TESTOSTERONE CYPIONATE (DEPOTESTOTERONE CYPIONATE) 100 MG/ML INJECTION testosterone cypionate (DEPOTESTOTERONE CYPIONATE) 100 mg/mL injection       Inject 1 mL (100 mg total) into the muscle every 7 days.    Inject 1 mL (100 mg total) into the muscle every 7 days.   Discontinued Medications    DILTIAZEM  MG CP12    Take 1 capsule by mouth 2 (two) times daily.    HYDROCHLOROTHIAZIDE (HYDRODIURIL) 50 MG TABLET    Take 50 mg by mouth once daily.       "

## 2023-12-20 NOTE — TELEPHONE ENCOUNTER
Spoke with pt. Explained PA has been approved and pharm receipt states 12/14. Verbalized he would check with pharm.

## 2023-12-21 ENCOUNTER — TELEPHONE (OUTPATIENT)
Dept: FAMILY MEDICINE | Facility: CLINIC | Age: 48
End: 2023-12-21
Payer: COMMERCIAL

## 2023-12-21 ENCOUNTER — PATIENT MESSAGE (OUTPATIENT)
Dept: FAMILY MEDICINE | Facility: CLINIC | Age: 48
End: 2023-12-21
Payer: COMMERCIAL

## 2023-12-21 NOTE — TELEPHONE ENCOUNTER
----- Message from Rosio Chaves sent at 12/21/2023  9:56 AM CST -----  Type:  Pharmacy Calling to Clarify an RX    Name of Caller:  Patient    Pharmacy Name:      [facility]      Prescription Name:  testosterone cypionate (DEPOTESTOTERONE CYPIONATE) 100 mg/mL injection     What do they need to clarify?:  Prior Authorization    Best Call Back Number:  [phone]    Additional Information: States he would like to speak with Dr Macedo herself to clarify what needs to be done for his prescription to be filled - please call - thank you

## 2023-12-26 NOTE — ASSESSMENT & PLAN NOTE
Education Record    Learner:  Patient    Disease / Diagnosis: Anemia due to chronic disease    Barriers / Limitations:  None   Comments:    Method:  Brief focused and Reinforcement   Comments:    General Topics:  Plan of care reviewed and Fall risk and prevention   Comments:    Outcome:  Shows understanding   Comments: May increase insulin resistance.

## 2024-01-01 NOTE — TELEPHONE ENCOUNTER
Care Due:                  Date            Visit Type   Department     Provider  --------------------------------------------------------------------------------                                EP -                              PRIMARY      [facility] FAM[facility]  Last Visit: 12-      CARE ([facility])   MEDICINE       Warner Macedo  Next Visit: None Scheduled  None         None Found                                                            Last  Test          Frequency    Reason                     Performed    Due Date  --------------------------------------------------------------------------------    HBA1C.......  6 months...  semaglutide,.............  09- 03-    [facility] Embedded Care Due Messages. Reference number: [id number].   1/01/2024 5:42:26 AM CST

## 2024-01-02 RX ORDER — ZOLPIDEM TARTRATE 10 MG/1
TABLET ORAL
Qty: 30 TABLET | Refills: 4 | Status: SHIPPED | OUTPATIENT
Start: 2024-01-02

## 2024-02-20 DIAGNOSIS — F41.9 ANXIETY: ICD-10-CM

## 2024-02-20 RX ORDER — ESCITALOPRAM OXALATE 10 MG/1
10 TABLET ORAL
Qty: 90 TABLET | Refills: 3 | Status: SHIPPED | OUTPATIENT
Start: 2024-02-20

## 2024-02-20 NOTE — TELEPHONE ENCOUNTER
No care due was identified.  [facility] Embedded Care Due Messages. Reference number: [id number].   2/20/2024 5:56:33 AM CST

## 2024-02-20 NOTE — TELEPHONE ENCOUNTER
Refill Decision Note   Aldnejesus Sueroin  is requesting a refill authorization.  Brief Assessment and Rationale for Refill:  Approve     Medication Therapy Plan:         Comments:     Note composed:7:31 AM 02/20/2024

## 2024-02-21 ENCOUNTER — PATIENT MESSAGE (OUTPATIENT)
Dept: FAMILY MEDICINE | Facility: CLINIC | Age: 49
End: 2024-02-21
Payer: COMMERCIAL

## 2024-02-23 ENCOUNTER — OFFICE VISIT (OUTPATIENT)
Dept: FAMILY MEDICINE | Facility: CLINIC | Age: 49
End: 2024-02-23
Payer: COMMERCIAL

## 2024-02-23 VITALS
BODY MASS INDEX: 29.09 KG/M2 | DIASTOLIC BLOOD PRESSURE: 74 MMHG | HEIGHT: 76 IN | WEIGHT: 238.88 LBS | OXYGEN SATURATION: 98 % | SYSTOLIC BLOOD PRESSURE: 134 MMHG | HEART RATE: 87 BPM

## 2024-02-23 DIAGNOSIS — I71.21 ANEURYSM OF ASCENDING AORTA WITHOUT RUPTURE: ICD-10-CM

## 2024-02-23 DIAGNOSIS — R73.02 GLUCOSE INTOLERANCE (IMPAIRED GLUCOSE TOLERANCE): ICD-10-CM

## 2024-02-23 DIAGNOSIS — I1A.0 RESISTANT HYPERTENSION: Primary | ICD-10-CM

## 2024-02-23 DIAGNOSIS — E78.2 MIXED HYPERLIPIDEMIA: ICD-10-CM

## 2024-02-23 DIAGNOSIS — I10 ESSENTIAL HYPERTENSION: ICD-10-CM

## 2024-02-23 DIAGNOSIS — E66.3 OVERWEIGHT (BMI 25.0-29.9): ICD-10-CM

## 2024-02-23 PROCEDURE — 4010F ACE/ARB THERAPY RXD/TAKEN: CPT | Mod: CPTII,S$GLB,,

## 2024-02-23 PROCEDURE — 99214 OFFICE O/P EST MOD 30 MIN: CPT | Mod: S$GLB,,,

## 2024-02-23 PROCEDURE — 3075F SYST BP GE 130 - 139MM HG: CPT | Mod: CPTII,S$GLB,,

## 2024-02-23 PROCEDURE — 1159F MED LIST DOCD IN RCRD: CPT | Mod: CPTII,S$GLB,,

## 2024-02-23 PROCEDURE — 99999 PR PBB SHADOW E&M-EST. PATIENT-LVL IV: CPT | Mod: PBBFAC,,,

## 2024-02-23 PROCEDURE — 3078F DIAST BP <80 MM HG: CPT | Mod: CPTII,S$GLB,,

## 2024-02-23 PROCEDURE — 3008F BODY MASS INDEX DOCD: CPT | Mod: CPTII,S$GLB,,

## 2024-02-23 RX ORDER — CARVEDILOL 6.25 MG/1
6.25 TABLET ORAL 2 TIMES DAILY WITH MEALS
Qty: 60 TABLET | Refills: 5 | Status: SHIPPED | OUTPATIENT
Start: 2024-02-23 | End: 2025-02-22

## 2024-02-23 RX ORDER — AMLODIPINE AND OLMESARTAN MEDOXOMIL 10; 40 MG/1; MG/1
1 TABLET ORAL DAILY
Qty: 90 TABLET | Refills: 3 | Status: SHIPPED | OUTPATIENT
Start: 2024-02-23 | End: 2024-06-05

## 2024-02-23 NOTE — PROGRESS NOTES
"[facility]  [address] [place] [address]    Subjective    Chief Complaint:   Chief Complaint   Patient presents with    Hypertension       History of Present Illness:     Alden Ledbetter is a(n) 48 y.o. male with past medical history as noted below who presents to the clinic today for hypertension concerns.    Currently taking olmesartan-amlodipine 40-10 mg (old rx) and chlorthalidone 25 mg BID. BP still elevated at home, usually upper 130s-140s/80s. He feels a headache and eye pressure when systolic BP is above 130. Previous regimen includes losartan 100 mg, wasn't working well. He does report snoring more since gaining a bit of weight. H/o thoracic aortic aneurysm and AVR.         Problem List:   Patient Active Problem List   Diagnosis    Essential hypertension    Male hypogonadism    Mixed hyperlipidemia    Aortic regurgitation due to bicuspid aortic valve    Thoracic aortic aneurysm (TAA)    Regular sinus bradycardia    Stress hyperglycemia    H/O aortic root repair    S/P AVR (aortic valve replacement)    Acute blood loss anemia    Hypophosphatemia    Anxiety    Warfarin anticoagulation    H/O heart valve replacement with mechanical valve       Current Outpatient Medications:   Current Outpatient Medications   Medication Instructions    amLODIPine (NORVASC) 10 mg, Oral, Daily    aspirin (ECOTRIN) 81 mg, Oral, Daily    chlorthalidone (HYGROTEN) 25 mg, Oral, 2 times daily    EScitalopram oxalate (LEXAPRO) 10 mg, Oral    esomeprazole (NEXIUM) 20 MG capsule No dose, route, or frequency recorded.    insulin syringe-needle U-100 (BD INSULIN SYRINGE) 1 mL 25 x 1" Syrg INJECT 1 SYRINGE AS DIRECTED EVERY 7 DAYS    losartan (COZAAR) 100 mg, Oral, Daily    rosuvastatin (CRESTOR) 10 mg, Oral    syringe with needle (BD LUER-KLAUDIA SYRINGE) 3 mL 25 gauge x 1" Syrg BD Luer-Klaudia Syringe 3 mL 25 gauge x 1"   ONE SYRINGE AS DIRECTED EVERY 7 DAYS    syringe with needle 1 " "mL 25 gauge x 1" Syrg 1 Syringe, Injection, Every 7 days, Disp 1 box    testosterone cypionate (DEPOTESTOTERONE CYPIONATE) 100 mg, Intramuscular, Every 7 days    warfarin (COUMADIN) 5 mg, Oral, Daily    WEGOVY 0.25 mg, Subcutaneous, Every 7 days    zolpidem (AMBIEN) 10 mg Tab TAKE 1 TABLET(10 MG) BY MOUTH EVERY NIGHT AS NEEDED       Surgical History:   Past Surgical History:   Procedure Laterality Date    BENTALL PROCEDURE FOR REPLACEMENT OF AORTIC VALVE, AORTIC ROOT, AND ASCENDING AORTA N/A 09/12/2022    Procedure: BENTALL PROCEDURE;  Surgeon: Thomas Boudreaux MD;  Location: [facility] OR [address] [place] [address];  Service: Cardiothoracic;  Laterality: N/A;  with hemiarch replacement    CARDIAC VALVE REPLACEMENT  9-12-22    Aortic mechanical and anuerysm repair    ELBOW SURGERY Right     LEFT HEART CATHETERIZATION N/A 08/24/2022    Procedure: Left heart cath;  Surgeon: Felix Monsalve MD;  Location: [facility] CATH LAB;  Service: Cardiology;  Laterality: N/A;    NASAL SEPTUM SURGERY      VASECTOMY  2015       Family History:   Family History   Problem Relation Age of Onset    Hypertension Mother     Heart disease Father     Colon polyps Father         tub Villous       Allergies:   Review of patient's allergies indicates:   Allergen Reactions    Dexlansoprazole     Prevacid [lansoprazole]        Tobacco Status:   Tobacco Use: Low Risk  (2/23/2024)    Patient History     Smoking Tobacco Use: Never     Smokeless Tobacco Use: Never     Passive Exposure: Not on file       Sexual Activity:   Social History     Substance and Sexual Activity   Sexual Activity Yes    Partners: Female    Birth control/protection: Partner-Vasectomy       Alcohol Use:   Social History     Substance and Sexual Activity   Alcohol Use Yes    Alcohol/week: 4.0 standard drinks of alcohol    Types: 2 Glasses of wine, 2 Cans of beer per week    Comment: socially         Objective       Vitals:    02/23/24 1024   BP: 134/74   Pulse: 87   SpO2: 98%   Weight: 108.3 kg (238 lb " "13.9 oz)   Height: 6' 4" (1.93 m)       Review of Systems   Constitutional:  Negative for chills and fever.   Respiratory:  Negative for cough and shortness of breath.    Cardiovascular:  Negative for chest pain.       Physical Exam  Constitutional:       General: He is not in acute distress.     Appearance: Normal appearance.   HENT:      Head: Normocephalic and atraumatic.   Cardiovascular:      Rate and Rhythm: Normal rate and regular rhythm.      Heart sounds: Normal heart sounds. No murmur heard.  Pulmonary:      Effort: Pulmonary effort is normal. No respiratory distress.      Breath sounds: Normal breath sounds. No wheezing.   Skin:     General: Skin is warm.   Neurological:      Mental Status: He is alert and oriented to person, place, and time.   Psychiatric:         Behavior: Behavior normal.           Assessment and Plan:    1. Resistant hypertension  -     amlodipine-olmesartan (FLO) 10-40 mg per tablet; Take 1 tablet by mouth once daily.  Dispense: 90 tablet; Refill: 3  -     carvediloL (COREG) 6.25 MG tablet; Take 1 tablet (6.25 mg total) by mouth 2 (two) times daily with meals.  Dispense: 60 tablet; Refill: 5  -     US Renal Artery Stenosis Hyperten (xpd); Future; Expected date: 02/23/2024  -     Ambulatory referral/consult to Sleep Disorders; Future; Expected date: 03/01/2024  -     Magnesium; Future; Expected date: 02/23/2024  -     BASIC METABOLIC PANEL; Future; Expected date: 02/23/2024  -     Aldosterone/Renin Activity Ratio; Future; Expected date: 02/23/2024    2. Glucose intolerance (impaired glucose tolerance)  -     HEMOGLOBIN A1C; Future; Expected date: 02/23/2024        Visit summary:    Alden Ledbetter presented today for hypertension.    Refilled amlodipine-olmesartan 10-40 mg daily, continue chlorthalidone, adding carvedilol 6.25 mg BID. F/u in 2 weeks for recheck.     Also checking Aldosterone/Renin activity, magnesium, BMP per pt request. Recommend renal artery US and sleep " study. Would consider checking urine metanephrines at next visit if BP not responsive to BB    Follow up: in 2 weeks for BP r/c      Kajal Ortiz PA-C    This note was created partially with voice dictation software and is prone to errors. This note has been reviewed by me but some errors are inevitable.

## 2024-02-23 NOTE — Clinical Note
I saw this patient today who is having a hard time getting Wegovy filled due to backorder. Would you be willing to send an alternative?

## 2024-02-27 RX ORDER — TIRZEPATIDE 2.5 MG/.5ML
2.5 INJECTION, SOLUTION SUBCUTANEOUS
Qty: 4 PEN | Refills: 0 | Status: SHIPPED | OUTPATIENT
Start: 2024-02-27 | End: 2024-03-08 | Stop reason: SDUPTHER

## 2024-03-05 ENCOUNTER — LAB VISIT (OUTPATIENT)
Dept: LAB | Facility: HOSPITAL | Age: 49
End: 2024-03-05
Payer: COMMERCIAL

## 2024-03-05 DIAGNOSIS — R73.02 GLUCOSE INTOLERANCE (IMPAIRED GLUCOSE TOLERANCE): ICD-10-CM

## 2024-03-05 DIAGNOSIS — I1A.0 RESISTANT HYPERTENSION: ICD-10-CM

## 2024-03-05 LAB
ANION GAP SERPL CALC-SCNC: 7 MMOL/L (ref 8–16)
BUN SERPL-MCNC: 15 MG/DL (ref 6–20)
CALCIUM SERPL-MCNC: 9.4 MG/DL (ref 8.7–10.5)
CHLORIDE SERPL-SCNC: 100 MMOL/L (ref 95–110)
CO2 SERPL-SCNC: 30 MMOL/L (ref 23–29)
CREAT SERPL-MCNC: 1.2 MG/DL (ref 0.5–1.4)
EST. GFR  (NO RACE VARIABLE): >60 ML/MIN/1.73 M^2
ESTIMATED AVG GLUCOSE: 100 MG/DL (ref 68–131)
GLUCOSE SERPL-MCNC: 83 MG/DL (ref 70–110)
HBA1C MFR BLD: 5.1 % (ref 4–5.6)
MAGNESIUM SERPL-MCNC: 1.9 MG/DL (ref 1.6–2.6)
POTASSIUM SERPL-SCNC: 4.2 MMOL/L (ref 3.5–5.1)
SODIUM SERPL-SCNC: 137 MMOL/L (ref 136–145)

## 2024-03-05 PROCEDURE — 83735 ASSAY OF MAGNESIUM: CPT

## 2024-03-05 PROCEDURE — 36415 COLL VENOUS BLD VENIPUNCTURE: CPT | Mod: PO

## 2024-03-05 PROCEDURE — 82088 ASSAY OF ALDOSTERONE: CPT

## 2024-03-05 PROCEDURE — 83036 HEMOGLOBIN GLYCOSYLATED A1C: CPT

## 2024-03-05 PROCEDURE — 80048 BASIC METABOLIC PNL TOTAL CA: CPT

## 2024-03-07 ENCOUNTER — OFFICE VISIT (OUTPATIENT)
Dept: FAMILY MEDICINE | Facility: CLINIC | Age: 49
End: 2024-03-07
Payer: COMMERCIAL

## 2024-03-07 VITALS
OXYGEN SATURATION: 98 % | HEART RATE: 58 BPM | DIASTOLIC BLOOD PRESSURE: 58 MMHG | BODY MASS INDEX: 28.34 KG/M2 | HEIGHT: 76 IN | SYSTOLIC BLOOD PRESSURE: 100 MMHG | WEIGHT: 232.69 LBS

## 2024-03-07 DIAGNOSIS — R73.02 GLUCOSE INTOLERANCE (IMPAIRED GLUCOSE TOLERANCE): ICD-10-CM

## 2024-03-07 DIAGNOSIS — E78.2 MIXED HYPERLIPIDEMIA: ICD-10-CM

## 2024-03-07 DIAGNOSIS — E66.3 OVERWEIGHT (BMI 25.0-29.9): ICD-10-CM

## 2024-03-07 DIAGNOSIS — I10 ESSENTIAL HYPERTENSION: Primary | ICD-10-CM

## 2024-03-07 DIAGNOSIS — I10 ESSENTIAL HYPERTENSION: ICD-10-CM

## 2024-03-07 DIAGNOSIS — J30.1 SEASONAL ALLERGIC RHINITIS DUE TO POLLEN: ICD-10-CM

## 2024-03-07 DIAGNOSIS — E29.1 MALE HYPOGONADISM: ICD-10-CM

## 2024-03-07 PROCEDURE — 3008F BODY MASS INDEX DOCD: CPT | Mod: CPTII,S$GLB,,

## 2024-03-07 PROCEDURE — 4010F ACE/ARB THERAPY RXD/TAKEN: CPT | Mod: CPTII,S$GLB,,

## 2024-03-07 PROCEDURE — 3044F HG A1C LEVEL LT 7.0%: CPT | Mod: CPTII,S$GLB,,

## 2024-03-07 PROCEDURE — 99999 PR PBB SHADOW E&M-EST. PATIENT-LVL IV: CPT | Mod: PBBFAC,,,

## 2024-03-07 PROCEDURE — 3074F SYST BP LT 130 MM HG: CPT | Mod: CPTII,S$GLB,,

## 2024-03-07 PROCEDURE — 99214 OFFICE O/P EST MOD 30 MIN: CPT | Mod: S$GLB,,,

## 2024-03-07 PROCEDURE — 1159F MED LIST DOCD IN RCRD: CPT | Mod: CPTII,S$GLB,,

## 2024-03-07 PROCEDURE — 3078F DIAST BP <80 MM HG: CPT | Mod: CPTII,S$GLB,,

## 2024-03-07 RX ORDER — CHLORTHALIDONE 25 MG/1
25 TABLET ORAL DAILY
Qty: 30 TABLET | Refills: 5 | Status: SHIPPED | OUTPATIENT
Start: 2024-03-07 | End: 2025-03-07

## 2024-03-07 NOTE — TELEPHONE ENCOUNTER
Please approve for testosterone cypionate (DEPOTESTOTERONE CYPIONATE) 100 mg/mL injection     Last OV 03/07/24  Last refill date 12/14/23  Last labs 03/05/24    Next appt N/A    Please approve for tirzepatide, weight loss, (ZEPBOUND) 2.5 mg/0.5 mL PnIj     Last OV 03/07/24  Last refill date 02/27/24  Last labs 03/05/24    Next appt N/A      Per Kajal RAMOS   Pt was seen today by her.- Petra MAN

## 2024-03-07 NOTE — PROGRESS NOTES
[facility]  [address]  [place] [address]    Subjective    Chief Complaint:   Chief Complaint   Patient presents with    Follow-up     F/U       History of Present Illness:     Alden Ledbetter is a(n) 48 y.o. male with past medical history as noted below who presents to the clinic today for follow up.    LOV with me 2 weeks ago, BP controlled in clinic but elevated and labile at home. We started carvedilol 6.25 mg BID and initiated workup for resistant hypertension.     Today, BP is low. Confirmed with my manual recheck. For the most part, BP has been normal to low at home. He did have an episode of symptomatic hypotension yesterday. Has been having frontal headaches despite adequate BP control, he suspects AR/sinus pressure as he has not been taking his allergy shots.     Workup for resistant hypertension not suggestive so far. Renal artery US with cortical thinning and increased resistive indices but no stenosis. In the process of scheduling home sleep study to r/o MESERET.          Problem List:   Patient Active Problem List   Diagnosis    Essential hypertension    Male hypogonadism    Mixed hyperlipidemia    Aortic regurgitation due to bicuspid aortic valve    Thoracic aortic aneurysm (TAA)    Regular sinus bradycardia    Stress hyperglycemia    H/O aortic root repair    S/P AVR (aortic valve replacement)    Acute blood loss anemia    Hypophosphatemia    Anxiety    Warfarin anticoagulation    H/O heart valve replacement with mechanical valve       Current Outpatient Medications:   Current Outpatient Medications   Medication Instructions    amlodipine-olmesartan (FLO) 10-40 mg per tablet 1 tablet, Oral, Daily    aspirin (ECOTRIN) 81 mg, Oral, Daily    carvediloL (COREG) 6.25 mg, Oral, 2 times daily with meals    chlorthalidone (HYGROTEN) 25 mg, Oral, 2 times daily    EScitalopram oxalate (LEXAPRO) 10 mg, Oral    esomeprazole (NEXIUM) 20 MG capsule No  "dose, route, or frequency recorded.    insulin syringe-needle U-100 (BD INSULIN SYRINGE) 1 mL 25 x 1" Syrg INJECT 1 SYRINGE AS DIRECTED EVERY 7 DAYS    rosuvastatin (CRESTOR) 10 mg, Oral    syringe with needle (BD LUER-KLAUDIA SYRINGE) 3 mL 25 gauge x 1" Syrg BD Luer-Klaudia Syringe 3 mL 25 gauge x 1"   ONE SYRINGE AS DIRECTED EVERY 7 DAYS    syringe with needle 1 mL 25 gauge x 1" Syrg 1 Syringe, Injection, Every 7 days, Disp 1 box    testosterone cypionate (DEPOTESTOTERONE CYPIONATE) 100 mg, Intramuscular, Every 7 days    warfarin (COUMADIN) 5 mg, Oral, Daily    ZEPBOUND 2.5 mg, Subcutaneous, Every 7 days    zolpidem (AMBIEN) 10 mg Tab TAKE 1 TABLET(10 MG) BY MOUTH EVERY NIGHT AS NEEDED       Surgical History:   Past Surgical History:   Procedure Laterality Date    BENTALL PROCEDURE FOR REPLACEMENT OF AORTIC VALVE, AORTIC ROOT, AND ASCENDING AORTA N/A 09/12/2022    Procedure: BENTALL PROCEDURE;  Surgeon: Thomas Boudreaux MD;  Location: [facility] OR [address] [place] [address];  Service: Cardiothoracic;  Laterality: N/A;  with hemiarch replacement    CARDIAC VALVE REPLACEMENT  9-12-22    Aortic mechanical and anuerysm repair    ELBOW SURGERY Right     LEFT HEART CATHETERIZATION N/A 08/24/2022    Procedure: Left heart cath;  Surgeon: Felix Monsalve MD;  Location: [facility] CATH LAB;  Service: Cardiology;  Laterality: N/A;    NASAL SEPTUM SURGERY      VASECTOMY  2015       Family History:   Family History   Problem Relation Age of Onset    Hypertension Mother     Heart disease Father     Colon polyps Father         tub Villous       Allergies:   Review of patient's allergies indicates:   Allergen Reactions    Dexlansoprazole     Prevacid [lansoprazole]        Tobacco Status:   Tobacco Use: Low Risk  (3/7/2024)    Patient History     Smoking Tobacco Use: Never     Smokeless Tobacco Use: Never     Passive Exposure: Not on file       Sexual Activity:   Social History     Substance and Sexual Activity   Sexual Activity Yes    Partners: Female    Birth " "control/protection: Partner-Vasectomy       Alcohol Use:   Social History     Substance and Sexual Activity   Alcohol Use Yes    Alcohol/week: 4.0 standard drinks of alcohol    Types: 2 Glasses of wine, 2 Cans of beer per week    Comment: socially         Objective       Vitals:    03/07/24 1001   BP: (!) 100/58   Pulse: (!) 58   SpO2: 98%   Weight: 105.6 kg (232 lb 11.1 oz)   Height: 6' 4" (1.93 m)       Review of Systems   Constitutional:  Negative for chills and fever.   Eyes:  Negative for blurred vision and double vision.   Respiratory:  Negative for cough and shortness of breath.    Cardiovascular:  Negative for chest pain.   Neurological:  Positive for headaches.       Physical Exam  Constitutional:       General: He is not in acute distress.     Appearance: Normal appearance.   HENT:      Head: Normocephalic and atraumatic.      Nose:      Right Sinus: Frontal sinus tenderness (pressure) present.      Left Sinus: Frontal sinus tenderness (pressure) present.   Eyes:      Conjunctiva/sclera: Conjunctivae normal.      Pupils: Pupils are equal, round, and reactive to light.   Cardiovascular:      Rate and Rhythm: Normal rate and regular rhythm.      Heart sounds: Normal heart sounds. No murmur heard.  Pulmonary:      Effort: Pulmonary effort is normal. No respiratory distress.      Breath sounds: Normal breath sounds. No wheezing.   Skin:     General: Skin is warm.   Neurological:      Mental Status: He is alert and oriented to person, place, and time.   Psychiatric:         Behavior: Behavior normal.           Assessment and Plan:    1. Essential hypertension  -     chlorthalidone (HYGROTEN) 25 MG Tab; Take 1 tablet (25 mg total) by mouth once daily.  Dispense: 30 tablet; Refill: 5    2. Seasonal allergic rhinitis due to pollen        Visit summary:    Aldengypsy Ledbetter presented today for hypertension f/u.    BP running normal to low. Will try decreasing chlorthalidone evening dose (patient requests trying " this 2/2 side effect nocturia). Will monitor BP and HR at home.  Patient will follow up sooner if blood pressure is persistently elevated. They will keep a blood pressure log and bring it to their next appointment.     Sinus pressure on exam-- recommend nasal steroid, nasal antihistamine. F/u sooner If this persists or worsens, would consider treating for migraine    Patient was instructed to report to ER if symptoms become severe.    Follow up: in 4 weeks with PCP      Kajal Ortiz PA-C    This note was created partially with voice dictation software and is prone to errors. This note has been reviewed by me but some errors are inevitable.

## 2024-03-07 NOTE — TELEPHONE ENCOUNTER
No care due was identified.  Health [facility] Embedded Care Due Messages. Reference number: [id number].   3/07/2024 10:54:37 AM CST

## 2024-03-08 ENCOUNTER — TELEPHONE (OUTPATIENT)
Dept: FAMILY MEDICINE | Facility: CLINIC | Age: 49
End: 2024-03-08
Payer: COMMERCIAL

## 2024-03-08 LAB
ALDOST SERPL-MCNC: 10.5 NG/DL
ALDOST/RENIN PLAS-RTO: 0.3 RATIO
RENIN PLAS-CCNC: 33.5 NG/ML/HR

## 2024-03-08 RX ORDER — TESTOSTERONE CYPIONATE 1000 MG/10ML
100 INJECTION, SOLUTION INTRAMUSCULAR
Qty: 12 ML | Refills: 0 | Status: SHIPPED | OUTPATIENT
Start: 2024-03-08 | End: 2024-06-18

## 2024-03-08 RX ORDER — TIRZEPATIDE 2.5 MG/.5ML
2.5 INJECTION, SOLUTION SUBCUTANEOUS
Qty: 4 PEN | Refills: 0 | Status: SHIPPED | OUTPATIENT
Start: 2024-03-08

## 2024-03-08 RX ORDER — TIRZEPATIDE 2.5 MG/.5ML
2.5 INJECTION, SOLUTION SUBCUTANEOUS
Qty: 4 PEN | Refills: 0 | Status: CANCELLED | OUTPATIENT
Start: 2024-03-08

## 2024-03-08 NOTE — TELEPHONE ENCOUNTER
Testosterone does not come in 100mg/ ml vial , OK to change to 200 mg / ml , inject 0. 5 ml . Spoke w/ Charlene at [facility] .--lp

## 2024-03-08 NOTE — TELEPHONE ENCOUNTER
----- Message from Alysia Knight sent at 3/8/2024  9:10 AM CST -----  Type:  Pharmacy Calling to Clarify an RX      [facility]   Prescription Name:  testosterone cypionate (DEPOTESTOTERONE CYPIONATE) 100 mg/mL injection  What do they need to clarify?:  does not come in they it was sent wants to know if dr wants it changed to 1/2 mL a week   Best Call Back Number:    [facility] #[id number] [place] [address] AT [facility] & [address] [address]  Phone: [phone] Fax: [phone]      Additional Information:  please advise

## 2024-04-09 ENCOUNTER — OFFICE VISIT (OUTPATIENT)
Dept: FAMILY MEDICINE | Facility: CLINIC | Age: 49
End: 2024-04-09
Payer: COMMERCIAL

## 2024-04-09 VITALS
OXYGEN SATURATION: 98 % | SYSTOLIC BLOOD PRESSURE: 100 MMHG | WEIGHT: 225.06 LBS | BODY MASS INDEX: 27.41 KG/M2 | HEART RATE: 71 BPM | HEIGHT: 76 IN | DIASTOLIC BLOOD PRESSURE: 54 MMHG

## 2024-04-09 DIAGNOSIS — E29.1 MALE HYPOGONADISM: ICD-10-CM

## 2024-04-09 DIAGNOSIS — I95.2 HYPOTENSION DUE TO DRUGS: Primary | ICD-10-CM

## 2024-04-09 DIAGNOSIS — I1A.0 RESISTANT HYPERTENSION: ICD-10-CM

## 2024-04-09 DIAGNOSIS — E78.2 MIXED HYPERLIPIDEMIA: ICD-10-CM

## 2024-04-09 PROCEDURE — 99999 PR PBB SHADOW E&M-EST. PATIENT-LVL IV: CPT | Mod: PBBFAC,,, | Performed by: INTERNAL MEDICINE

## 2024-04-09 PROCEDURE — 3044F HG A1C LEVEL LT 7.0%: CPT | Mod: CPTII,S$GLB,, | Performed by: INTERNAL MEDICINE

## 2024-04-09 PROCEDURE — 3078F DIAST BP <80 MM HG: CPT | Mod: CPTII,S$GLB,, | Performed by: INTERNAL MEDICINE

## 2024-04-09 PROCEDURE — 3008F BODY MASS INDEX DOCD: CPT | Mod: CPTII,S$GLB,, | Performed by: INTERNAL MEDICINE

## 2024-04-09 PROCEDURE — 1159F MED LIST DOCD IN RCRD: CPT | Mod: CPTII,S$GLB,, | Performed by: INTERNAL MEDICINE

## 2024-04-09 PROCEDURE — 99214 OFFICE O/P EST MOD 30 MIN: CPT | Mod: S$GLB,,, | Performed by: INTERNAL MEDICINE

## 2024-04-09 PROCEDURE — 4010F ACE/ARB THERAPY RXD/TAKEN: CPT | Mod: CPTII,S$GLB,, | Performed by: INTERNAL MEDICINE

## 2024-04-09 PROCEDURE — 3074F SYST BP LT 130 MM HG: CPT | Mod: CPTII,S$GLB,, | Performed by: INTERNAL MEDICINE

## 2024-04-09 PROCEDURE — 1160F RVW MEDS BY RX/DR IN RCRD: CPT | Mod: CPTII,S$GLB,, | Performed by: INTERNAL MEDICINE

## 2024-04-09 RX ORDER — AMLODIPINE AND OLMESARTAN MEDOXOMIL 10; 40 MG/1; MG/1
1 TABLET ORAL DAILY
Qty: 90 TABLET | Refills: 3 | Status: CANCELLED | OUTPATIENT
Start: 2024-04-09 | End: 2025-04-09

## 2024-04-09 NOTE — PROGRESS NOTES
Subjective:       Patient ID: Alden Ledbetter is a 49 y.o. male.    Chief Complaint: Follow-up (Bp check )   Follow-up  Associated symptoms include fatigue. Pertinent negatives include no chest pain, chills or joint swelling.        PSA: 0.6 (4/23   Colonoscopy: Dr Arzate 2022 normal   Immunizations: Flu: yes Tdap: 2018   Covid: yes   Smoker: never      Started Zepbound losing weight now bp low.     HTN: low  Rx Tony 10/40, Coreg 6.25 bid, Chlorthalidone 25 - will cut to prn   Prev Losartan 100, Hctz 25, Norvasc 10   Off diltiazem 120 twice daily.               SE Metoprolol  fatigue      Bicuspid AV w moderate AR, ascending aortic aneurysm:   Rx life long Warfarin.  s/p Aortic root replacement (Bentall procedure) w mechanical valve.  Monitors INR at home.  Cardio Dr Carlton         Mixed HLD: controlled Rx Crestor 10      Male hypogonadism: controlled dx approx 2018// Rx Testosterone cypionate injection 0.5 weekly.    Improved energy, less fatigue.  Is finding gaining weight.  Previously Axiron 2 // T 918 1 day after shot.  (Prev starting low levels labs 284 & 3.4 and 194 & T 5.7)      Anxiety:  controlled Rx lexapro 10 mg.  Trigger medical heart valve replacement.       GERD: controlled Rx Nexium 20 OTC     Glucose intolerance:  Glucose 102-- previous 100.       Insomnia: cyclic Rx Ambien 10      Metabolic syndrome 29 / 238, 27/225. Rx Zepbound     ____________________________________________________________________________________________________  Assessment & Plan:  1. Hypotension due to drugs    2. Resistant hypertension    3. Mixed hyperlipidemia    4. Male hypogonadism     Hypotension due to drugs  Comments:  cut prn    Resistant hypertension    Mixed hyperlipidemia    Male hypogonadism        Continue to work on regular exercise, maintain healthy weight, balanced diet. Avoid unhealthy habits: smoking, excessive alcohol intake.     Disclaimer: This note was partly generated using dictation software which  may occasionally result in transcription errors  ____________________________________________________________________________________________________  Review of Systems:  Review of Systems   Constitutional:  Positive for fatigue. Negative for chills.   HENT:  Negative for drooling.    Eyes:  Negative for pain.   Respiratory:  Negative for choking.    Cardiovascular:  Negative for chest pain.   Gastrointestinal:  Negative for blood in stool.   Genitourinary:  Negative for hematuria.   Musculoskeletal:  Negative for joint swelling.   Skin:  Negative for pallor.   Neurological:  Negative for facial asymmetry.   Psychiatric/Behavioral:  Negative for confusion.        Objective:     Wt Readings from Last 3 Encounters:   04/09/24 102.1 kg (225 lb 1.4 oz)   03/07/24 105.6 kg (232 lb 11.1 oz)   02/23/24 108.3 kg (238 lb 13.9 oz)     BP Readings from Last 3 Encounters:   04/09/24 (!) 100/54   03/07/24 (!) 100/58   02/23/24 134/74       Lab Results   Component Value Date    WBC 6.11 12/08/2023    HGB 16.9 12/08/2023    HCT 50.1 12/08/2023     12/08/2023     03/05/2024    K 4.2 03/05/2024     03/05/2024    ALT 29 12/08/2023    AST 29 12/08/2023    CO2 30 (H) 03/05/2024    CREATININE 1.2 03/05/2024    BUN 15 03/05/2024    PSA 0.60 04/21/2023    GLU 83 03/05/2024      Hemoglobin A1C   Date Value Ref Range Status   03/05/2024 5.1 4.0 - 5.6 % Final     Comment:     ADA Screening Guidelines:  5.7-6.4%  Consistent with prediabetes  >or=6.5%  Consistent with diabetes    High levels of fetal hemoglobin interfere with the HbA1C  assay. Heterozygous hemoglobin variants (HbS, HgC, etc)do  not significantly interfere with this assay.   However, presence of multiple variants may affect accuracy.     09/08/2022 5.4 4.0 - 5.6 % Final     Comment:     ADA Screening Guidelines:  5.7-6.4%  Consistent with prediabetes  >or=6.5%  Consistent with diabetes    High levels of fetal hemoglobin interfere with the HbA1C  assay.  "Heterozygous hemoglobin variants (HbS, HgC, etc)do  not significantly interfere with this assay.   However, presence of multiple variants may affect accuracy.       Hemoglobin A1c   Date Value Ref Range Status   06/10/2022 5.4 4.8 - 5.6 % Final     Comment:              Prediabetes: 5.7 - 6.4           Diabetes: >6.4           Glycemic control for adults with diabetes: <7.0        Lab Results   Component Value Date    TSH 1.345 04/21/2023    TSH 1.370 06/10/2022     No results found for: "FREET4"  Lab Results   Component Value Date    LDLCALC 92.2 12/08/2023    LDLCALC 85.8 04/21/2023    LDLCALC 77 02/04/2022     Lab Results   Component Value Date    TRIG 74 12/08/2023    TRIG 81 04/21/2023    TRIG 61 02/04/2022            Physical Exam  Constitutional:       Appearance: Normal appearance.   HENT:      Head: Normocephalic and atraumatic.   Eyes:      Extraocular Movements: Extraocular movements intact.      Conjunctiva/sclera: Conjunctivae normal.      Pupils: Pupils are equal, round, and reactive to light.   Cardiovascular:      Rate and Rhythm: Normal rate.   Pulmonary:      Effort: Pulmonary effort is normal.   Neurological:      Mental Status: He is alert.         Medication List with Changes/Refills   Current Medications    AMLODIPINE-OLMESARTAN (FLO) 10-40 MG PER TABLET    Take 1 tablet by mouth once daily.    ASPIRIN (ECOTRIN) 81 MG EC TABLET    Take 1 tablet (81 mg total) by mouth once daily.    CARVEDILOL (COREG) 6.25 MG TABLET    Take 1 tablet (6.25 mg total) by mouth 2 (two) times daily with meals.    CHLORTHALIDONE (HYGROTEN) 25 MG TAB    Take 1 tablet (25 mg total) by mouth once daily.    ESCITALOPRAM OXALATE (LEXAPRO) 10 MG TABLET    TAKE 1 TABLET(10 MG) BY MOUTH EVERY DAY    ESOMEPRAZOLE (NEXIUM) 20 MG CAPSULE        INSULIN SYRINGE-NEEDLE U-100 (BD INSULIN SYRINGE) 1 ML 25 X 1" SYRG    INJECT 1 SYRINGE AS DIRECTED EVERY 7 DAYS    ROSUVASTATIN (CRESTOR) 10 MG TABLET    TAKE 1 TABLET(10 MG) BY MOUTH " "EVERY DAY    SYRINGE WITH NEEDLE (BD LUER-KLAUDIA SYRINGE) 3 ML 25 GAUGE X 1" SYRG    BD Luer-Klaudia Syringe 3 mL 25 gauge x 1"   ONE SYRINGE AS DIRECTED EVERY 7 DAYS    SYRINGE WITH NEEDLE 1 ML 25 GAUGE X 1" SYRG    Inject 1 Syringe as directed every 7 days. Disp 1 box    TESTOSTERONE CYPIONATE (DEPOTESTOTERONE CYPIONATE) 100 MG/ML INJECTION    Inject 1 mL (100 mg total) into the muscle every 7 days. # 1    TIRZEPATIDE, WEIGHT LOSS, (ZEPBOUND) 2.5 MG/0.5 ML PNIJ    Inject 2.5 mg into the skin every 7 days.    WARFARIN (COUMADIN) 5 MG TABLET    Take 1 tablet (5 mg total) by mouth Daily.    ZOLPIDEM (AMBIEN) 10 MG TAB    TAKE 1 TABLET(10 MG) BY MOUTH EVERY NIGHT AS NEEDED       "

## 2024-04-16 ENCOUNTER — PATIENT MESSAGE (OUTPATIENT)
Dept: FAMILY MEDICINE | Facility: CLINIC | Age: 49
End: 2024-04-16
Payer: COMMERCIAL

## 2024-04-16 DIAGNOSIS — I10 ESSENTIAL HYPERTENSION: ICD-10-CM

## 2024-04-16 DIAGNOSIS — E66.3 OVERWEIGHT (BMI 25.0-29.9): ICD-10-CM

## 2024-04-16 DIAGNOSIS — E78.2 MIXED HYPERLIPIDEMIA: ICD-10-CM

## 2024-04-16 DIAGNOSIS — R73.02 GLUCOSE INTOLERANCE (IMPAIRED GLUCOSE TOLERANCE): ICD-10-CM

## 2024-04-16 RX ORDER — TIRZEPATIDE 5 MG/.5ML
5 INJECTION, SOLUTION SUBCUTANEOUS
Qty: 4 PEN | Refills: 4 | Status: SHIPPED | OUTPATIENT
Start: 2024-04-16

## 2024-04-16 RX ORDER — TIRZEPATIDE 2.5 MG/.5ML
2.5 INJECTION, SOLUTION SUBCUTANEOUS
Qty: 4 PEN | Refills: 0 | OUTPATIENT
Start: 2024-04-16

## 2024-06-04 ENCOUNTER — PATIENT MESSAGE (OUTPATIENT)
Dept: FAMILY MEDICINE | Facility: CLINIC | Age: 49
End: 2024-06-04
Payer: COMMERCIAL

## 2024-06-04 DIAGNOSIS — E78.2 MIXED HYPERLIPIDEMIA: ICD-10-CM

## 2024-06-04 RX ORDER — ROSUVASTATIN CALCIUM 10 MG/1
10 TABLET, COATED ORAL DAILY
Qty: 90 TABLET | Refills: 1 | Status: SHIPPED | OUTPATIENT
Start: 2024-06-04

## 2024-06-04 NOTE — TELEPHONE ENCOUNTER
Please approve for rosuvastatin (CRESTOR) 10 MG tablet     Last OV 04/09/24  Last refill date 10/29/23  Last labs 03/05/24    Next appt 10/03/24

## 2024-06-04 NOTE — TELEPHONE ENCOUNTER
Care Due:                  Date            Visit Type   Department     Provider  --------------------------------------------------------------------------------                                EP -                              PRIMARY      [facility]  Last Visit: 04-      CARE ([facility])   MEDICINE       Warner Macedo                              SAME DAY -                              ESTABLISHED   [facility]  Next Visit: 10-      PATIENT      MEDICINE       Warner Macedo                                                            Last  Test          Frequency    Reason                     Performed    Due Date  --------------------------------------------------------------------------------    HBA1C.......  6 months...  tirzepatide,.............  03- 09-    Health [facility] Embedded Care Due Messages. Reference number: [id number].   6/04/2024 4:21:38 PM CDT

## 2024-06-04 NOTE — TELEPHONE ENCOUNTER
Provider Staff:  Action required for this patient    Requires labs      Please see care gap opportunities below in Care Due Message.    Thanks!  [facility]     Appointments      Date Provider   Last Visit   4/9/2024 Warner Macedo MD   Next Visit   10/3/2024 Warner Macedo MD     Refill Decision Note   Alden Ledbetter  is requesting a refill authorization.  Brief Assessment and Rationale for Refill:  Approve     Medication Therapy Plan:         Comments:     Note composed:4:59 PM 06/04/2024

## 2024-06-05 RX ORDER — AMLODIPINE AND OLMESARTAN MEDOXOMIL 5; 40 MG/1; MG/1
1 TABLET ORAL DAILY
Qty: 90 TABLET | Refills: 3 | Status: SHIPPED | OUTPATIENT
Start: 2024-06-05 | End: 2025-06-05

## 2024-06-17 DIAGNOSIS — E29.1 MALE HYPOGONADISM: ICD-10-CM

## 2024-06-17 NOTE — TELEPHONE ENCOUNTER
No care due was identified.  [facility] Embedded Care Due Messages. Reference number: [id number].   6/17/2024 6:51:58 AM CDT

## 2024-06-17 NOTE — TELEPHONE ENCOUNTER
Refill Routing Note   Medication(s) are not appropriate for processing by [facility] for the following reason(s):        Outside of protocol    [facility] action(s):  Route             Appointments  past 12m or future 3m with PCP    Date Provider   Last Visit   4/9/2024 Warner Macedo MD   Next Visit   10/3/2024 Warner Macedo MD   ED visits in past 90 days: 0        Note composed:8:44 AM 06/17/2024

## 2024-06-18 DIAGNOSIS — E29.1 MALE HYPOGONADISM: ICD-10-CM

## 2024-06-18 RX ORDER — TESTOSTERONE CYPIONATE 200 MG/ML
INJECTION, SOLUTION INTRAMUSCULAR
Qty: 12 ML | Refills: 0 | Status: SHIPPED | OUTPATIENT
Start: 2024-06-18

## 2024-06-18 RX ORDER — TESTOSTERONE CYPIONATE 1000 MG/10ML
100 INJECTION, SOLUTION INTRAMUSCULAR
Qty: 12 ML | Refills: 0 | OUTPATIENT
Start: 2024-06-18 | End: 2024-12-17

## 2024-06-18 NOTE — TELEPHONE ENCOUNTER
"Please approve for insulin syringe-needle U-100 (BD INSULIN SYRINGE) 1 mL 25 x 1" Syrg     Last OV 04/09/24  Last refill date 09/19/22  Last labs 03/05/24    Next appt 10/03/24  "

## 2024-06-18 NOTE — TELEPHONE ENCOUNTER
No care due was identified.  Health [facility] Embedded Care Due Messages. Reference number: [id number].   6/18/2024 9:32:47 AM CDT

## 2024-06-18 NOTE — TELEPHONE ENCOUNTER
Refill Routing Note   Medication(s) are not appropriate for processing by [facility] for the following reason(s):        Outside of protocol    [facility] action(s):  Route        Medication Therapy Plan: Supplies related to Testosterone injections are Out of Protocol      Appointments  past 12m or future 3m with PCP    Date Provider   Last Visit   4/9/2024 Warner Macedo MD   Next Visit   10/3/2024 Warner Macedo MD   ED visits in past 90 days: 0        Note composed:9:57 AM 06/18/2024

## 2024-06-19 RX ORDER — SYRINGE AND NEEDLE,INSULIN,1ML 25GX1"
1 SYRINGE, EMPTY DISPOSABLE MISCELLANEOUS WEEKLY
Qty: 12 EACH | Refills: 2 | Status: SHIPPED | OUTPATIENT
Start: 2024-06-19

## 2024-08-08 RX ORDER — ZOLPIDEM TARTRATE 10 MG/1
10 TABLET ORAL NIGHTLY PRN
Qty: 30 TABLET | Refills: 0 | Status: SHIPPED | OUTPATIENT
Start: 2024-08-08

## 2024-08-14 DIAGNOSIS — I1A.0 RESISTANT HYPERTENSION: ICD-10-CM

## 2024-08-14 RX ORDER — CARVEDILOL 6.25 MG/1
6.25 TABLET ORAL 2 TIMES DAILY
Qty: 180 TABLET | Refills: 2 | Status: SHIPPED | OUTPATIENT
Start: 2024-08-14

## 2024-08-14 NOTE — TELEPHONE ENCOUNTER
No care due was identified.  Health [facility] Embedded Care Due Messages. Reference number: [id number].   8/14/2024 3:25:21 PM CDT

## 2024-08-14 NOTE — TELEPHONE ENCOUNTER
Refill Routing Note   Medication(s) are not appropriate for processing by [facility] for the following reason(s):        No active prescription written by provider: not yet signed by current PCP    [facility] action(s):  Defer      Medication Therapy Plan:         Appointments  past 12m or future 3m with PCP    Date Provider   Last Visit   4/9/2024 Warner Macedo MD   Next Visit   10/3/2024 Warner Macedo MD   ED visits in past 90 days: 0        Note composed:3:39 PM 08/14/2024

## 2024-09-17 DIAGNOSIS — E78.2 MIXED HYPERLIPIDEMIA: ICD-10-CM

## 2024-09-17 DIAGNOSIS — Z00.00 WELLNESS EXAMINATION: ICD-10-CM

## 2024-09-17 DIAGNOSIS — E29.1 MALE HYPOGONADISM: ICD-10-CM

## 2024-09-17 DIAGNOSIS — R73.02 GLUCOSE INTOLERANCE (IMPAIRED GLUCOSE TOLERANCE): Primary | ICD-10-CM

## 2024-09-17 DIAGNOSIS — Z12.5 SCREENING PSA (PROSTATE SPECIFIC ANTIGEN): ICD-10-CM

## 2024-09-17 NOTE — TELEPHONE ENCOUNTER
No care due was identified.  [facility] Embedded Care Due Messages. Reference number: [id number].   9/17/2024 8:12:44 AM CDT

## 2024-09-17 NOTE — TELEPHONE ENCOUNTER
Care Due:                  Date            Visit Type   Department     Provider  --------------------------------------------------------------------------------                                EP -                              PRIMARY      [facility] FAMILY  Last Visit: 04-      CARE ([facility])   MEDICINE       Warner Macedo                              SAME DAY -                              ESTABLISHED   [facility]  Next Visit: 10-      PATIENT      MEDICINE       Warner Macedo                                                            Last  Test          Frequency    Reason                     Performed    Due Date  --------------------------------------------------------------------------------    CMP.........  12 months..  rosuvastatin.............  12- 12-    Lipid Panel.  12 months..  rosuvastatin.............  12- 12-    [employer] Embedded Care Due Messages. Reference number: [id number].   9/17/2024 8:12:15 AM CDT

## 2024-09-17 NOTE — TELEPHONE ENCOUNTER
Please advise  LOV: 4/9/24  Nxt Apt: 10/3/24  Last Fill:6/18/24----12ML----0       Last Testosterone Lab 4/21/23-----1014---NORMAL per your note

## 2024-09-18 RX ORDER — TESTOSTERONE CYPIONATE 200 MG/ML
INJECTION, SOLUTION INTRAMUSCULAR
Qty: 12 ML | Refills: 0 | OUTPATIENT
Start: 2024-09-18

## 2024-09-18 RX ORDER — SYRINGE AND NEEDLE,INSULIN,1ML 25GX1"
1 SYRINGE, EMPTY DISPOSABLE MISCELLANEOUS WEEKLY
Qty: 12 EACH | Refills: 12 | Status: SHIPPED | OUTPATIENT
Start: 2024-09-18

## 2024-09-19 NOTE — TELEPHONE ENCOUNTER
Refill requires labs every 6 Months.  Not done and his PSA is over due.  Get labs done before refills given

## 2024-09-27 ENCOUNTER — PATIENT MESSAGE (OUTPATIENT)
Dept: FAMILY MEDICINE | Facility: CLINIC | Age: 49
End: 2024-09-27
Payer: COMMERCIAL

## 2024-09-27 ENCOUNTER — LAB VISIT (OUTPATIENT)
Dept: LAB | Facility: HOSPITAL | Age: 49
End: 2024-09-27
Attending: INTERNAL MEDICINE
Payer: COMMERCIAL

## 2024-09-27 DIAGNOSIS — E78.2 MIXED HYPERLIPIDEMIA: ICD-10-CM

## 2024-09-27 DIAGNOSIS — R73.02 GLUCOSE INTOLERANCE (IMPAIRED GLUCOSE TOLERANCE): ICD-10-CM

## 2024-09-27 DIAGNOSIS — Z00.00 WELLNESS EXAMINATION: ICD-10-CM

## 2024-09-27 DIAGNOSIS — Z12.5 SCREENING PSA (PROSTATE SPECIFIC ANTIGEN): ICD-10-CM

## 2024-09-27 LAB
ALBUMIN SERPL BCP-MCNC: 4.1 G/DL (ref 3.5–5.2)
ALP SERPL-CCNC: 46 U/L (ref 55–135)
ALT SERPL W/O P-5'-P-CCNC: 17 U/L (ref 10–44)
ANION GAP SERPL CALC-SCNC: 7 MMOL/L (ref 8–16)
AST SERPL-CCNC: 23 U/L (ref 10–40)
BILIRUB SERPL-MCNC: 0.9 MG/DL (ref 0.1–1)
BUN SERPL-MCNC: 10 MG/DL (ref 6–20)
CALCIUM SERPL-MCNC: 8.8 MG/DL (ref 8.7–10.5)
CHLORIDE SERPL-SCNC: 107 MMOL/L (ref 95–110)
CHOLEST SERPL-MCNC: 139 MG/DL (ref 120–199)
CHOLEST/HDLC SERPL: 3 {RATIO} (ref 2–5)
CO2 SERPL-SCNC: 26 MMOL/L (ref 23–29)
COMPLEXED PSA SERPL-MCNC: 0.44 NG/ML (ref 0–4)
CREAT SERPL-MCNC: 1.1 MG/DL (ref 0.5–1.4)
ERYTHROCYTE [DISTWIDTH] IN BLOOD BY AUTOMATED COUNT: 14.3 % (ref 11.5–14.5)
EST. GFR  (NO RACE VARIABLE): >60 ML/MIN/1.73 M^2
ESTIMATED AVG GLUCOSE: 91 MG/DL (ref 68–131)
GLUCOSE SERPL-MCNC: 86 MG/DL (ref 70–110)
HBA1C MFR BLD: 4.8 % (ref 4–5.6)
HCT VFR BLD AUTO: 49.4 % (ref 40–54)
HDLC SERPL-MCNC: 46 MG/DL (ref 40–75)
HDLC SERPL: 33.1 % (ref 20–50)
HGB BLD-MCNC: 16.6 G/DL (ref 14–18)
LDLC SERPL CALC-MCNC: 77.4 MG/DL (ref 63–159)
MCH RBC QN AUTO: 30.2 PG (ref 27–31)
MCHC RBC AUTO-ENTMCNC: 33.6 G/DL (ref 32–36)
MCV RBC AUTO: 90 FL (ref 82–98)
NONHDLC SERPL-MCNC: 93 MG/DL
PLATELET # BLD AUTO: 197 K/UL (ref 150–450)
PMV BLD AUTO: 9.8 FL (ref 9.2–12.9)
POTASSIUM SERPL-SCNC: 4.3 MMOL/L (ref 3.5–5.1)
PROT SERPL-MCNC: 6.8 G/DL (ref 6–8.4)
RBC # BLD AUTO: 5.49 M/UL (ref 4.6–6.2)
SODIUM SERPL-SCNC: 140 MMOL/L (ref 136–145)
TRIGL SERPL-MCNC: 78 MG/DL (ref 30–150)
WBC # BLD AUTO: 5.93 K/UL (ref 3.9–12.7)

## 2024-09-27 PROCEDURE — 80053 COMPREHEN METABOLIC PANEL: CPT | Performed by: INTERNAL MEDICINE

## 2024-09-27 PROCEDURE — 83036 HEMOGLOBIN GLYCOSYLATED A1C: CPT | Performed by: INTERNAL MEDICINE

## 2024-09-27 PROCEDURE — 36415 COLL VENOUS BLD VENIPUNCTURE: CPT | Mod: PN | Performed by: INTERNAL MEDICINE

## 2024-09-27 PROCEDURE — 85027 COMPLETE CBC AUTOMATED: CPT | Performed by: INTERNAL MEDICINE

## 2024-09-27 PROCEDURE — 84153 ASSAY OF PSA TOTAL: CPT | Performed by: INTERNAL MEDICINE

## 2024-09-27 PROCEDURE — 80061 LIPID PANEL: CPT | Performed by: INTERNAL MEDICINE

## 2024-10-03 ENCOUNTER — OFFICE VISIT (OUTPATIENT)
Dept: FAMILY MEDICINE | Facility: CLINIC | Age: 49
End: 2024-10-03
Payer: COMMERCIAL

## 2024-10-03 VITALS
HEIGHT: 76 IN | OXYGEN SATURATION: 98 % | WEIGHT: 217.25 LBS | RESPIRATION RATE: 18 BRPM | DIASTOLIC BLOOD PRESSURE: 68 MMHG | BODY MASS INDEX: 26.45 KG/M2 | HEART RATE: 71 BPM | SYSTOLIC BLOOD PRESSURE: 115 MMHG

## 2024-10-03 DIAGNOSIS — I10 ESSENTIAL HYPERTENSION: ICD-10-CM

## 2024-10-03 DIAGNOSIS — E78.2 MIXED HYPERLIPIDEMIA: ICD-10-CM

## 2024-10-03 DIAGNOSIS — Z95.2 H/O HEART VALVE REPLACEMENT WITH MECHANICAL VALVE: ICD-10-CM

## 2024-10-03 DIAGNOSIS — E66.3 OVERWEIGHT (BMI 25.0-29.9): ICD-10-CM

## 2024-10-03 DIAGNOSIS — Z79.01 WARFARIN ANTICOAGULATION: ICD-10-CM

## 2024-10-03 DIAGNOSIS — Z23 IMMUNIZATION DUE: ICD-10-CM

## 2024-10-03 DIAGNOSIS — I1A.0 RESISTANT HYPERTENSION: ICD-10-CM

## 2024-10-03 DIAGNOSIS — F41.9 ANXIETY: ICD-10-CM

## 2024-10-03 DIAGNOSIS — Z00.00 WELLNESS EXAMINATION: Primary | ICD-10-CM

## 2024-10-03 DIAGNOSIS — E29.1 MALE HYPOGONADISM: ICD-10-CM

## 2024-10-03 DIAGNOSIS — R73.02 GLUCOSE INTOLERANCE (IMPAIRED GLUCOSE TOLERANCE): ICD-10-CM

## 2024-10-03 PROCEDURE — 99999 PR PBB SHADOW E&M-EST. PATIENT-LVL IV: CPT | Mod: PBBFAC,,, | Performed by: INTERNAL MEDICINE

## 2024-10-03 RX ORDER — TRAZODONE HYDROCHLORIDE 50 MG/1
50 TABLET ORAL NIGHTLY PRN
COMMUNITY
Start: 2024-05-07

## 2024-10-03 RX ORDER — CARVEDILOL 6.25 MG/1
6.25 TABLET ORAL 2 TIMES DAILY
Qty: 180 TABLET | Refills: 2 | Status: SHIPPED | OUTPATIENT
Start: 2024-10-03

## 2024-10-03 RX ORDER — AMLODIPINE AND OLMESARTAN MEDOXOMIL 5; 40 MG/1; MG/1
1 TABLET ORAL DAILY
Qty: 90 TABLET | Refills: 2 | Status: SHIPPED | OUTPATIENT
Start: 2024-10-03 | End: 2025-10-03

## 2024-10-03 RX ORDER — ZOLPIDEM TARTRATE 10 MG/1
10 TABLET ORAL NIGHTLY PRN
Qty: 30 TABLET | Refills: 0 | Status: SHIPPED | OUTPATIENT
Start: 2024-10-03

## 2024-10-03 RX ORDER — ROSUVASTATIN CALCIUM 10 MG/1
10 TABLET, COATED ORAL DAILY
Qty: 90 TABLET | Refills: 2 | Status: SHIPPED | OUTPATIENT
Start: 2024-10-03

## 2024-10-03 RX ORDER — TESTOSTERONE CYPIONATE 1000 MG/10ML
100 INJECTION, SOLUTION INTRAMUSCULAR
Qty: 10 ML | Refills: 1 | Status: SHIPPED | OUTPATIENT
Start: 2024-10-03 | End: 2025-04-03

## 2024-10-03 NOTE — PROGRESS NOTES
Subjective:       Patient ID: Alden Ledbetter is a 49 y.o. male.    Chief Complaint: Follow-up and Annual Exam   HPI    PSA: 0.4 (9/24    Colonoscopy: Dr Arzate 2022 normal   Immunizations: Flu: Y Tdap: 2018     Smoker: never     Wellness          HTN: controlled Rx Tony 10/40, Coreg 6.25 bid,   Off Chlorthalidone 25   Prev Losartan 100, Hctz 25, Norvasc 10 / Off diltiazem 120 twice daily.               SE Metoprolol  fatigue      Bicuspid AV w moderate AR, ascending aortic aneurysm:   Rx Warfarin.  s/p Aortic root replacement (Bentall procedure) w mechanical valve.  Monitors INR at home.    Mgmt Cardio Dr Brandt Monzon HLD: controlled Rx Crestor 10      Male hypogonadism: controlled dx approx 2018 // Rx Testosterone injection 100 mg / weekly.    Improved energy, less fatigue. Previously Axiron 2 // T 918 1 day after shot.  (Prev starting low levels labs 284 & 3.4 and 194 & T 5.7)      Anxiety:  controlled Rx Lexapro 10, Med THC   Trigger medical heart valve replacement.       GERD: controlled Rx Nexium 20 OTC     Glucose intolerance:  Glucose 102-- previous 100.       Insomnia: cyclic Rx Ambien 10      Metabolic syndrome 29 / 238, April 24: 27/225, Oct 24; 26/217  // Rx Zepbound 5     ____________________________________________________________________________________________________  Assessment & Plan:  1. Wellness examination  - Comprehensive Metabolic Panel; Future  - CBC Without Differential; Future  - Lipid Panel; Future    2. Essential hypertension    3. Mixed hyperlipidemia  - Lipid Panel; Future    4. Male hypogonadism  - testosterone cypionate (DEPOTESTOTERONE CYPIONATE) 100 mg/mL injection; Inject 1 mL (100 mg total) into the muscle every 7 days.  Dispense: 10 mL; Refill: 1    5. Glucose intolerance (impaired glucose tolerance)  - Comprehensive Metabolic Panel; Future  - CBC Without Differential; Future    6. Immunization due  - influenza (Flulaval, Fluzone, Fluarix) 45 mcg/0.5 mL IM vaccine  (> or = 6 mo) 0.5 mL    7. H/O heart valve replacement with mechanical valve    8. Warfarin anticoagulation    9. Overweight (BMI 25.0-29.9)     Wellness examination  -     Comprehensive Metabolic Panel; Future; Expected date: 10/03/2024  -     CBC Without Differential; Future; Expected date: 10/03/2024  -     Lipid Panel; Future; Expected date: 10/03/2024    Essential hypertension    Mixed hyperlipidemia  -     Lipid Panel; Future; Expected date: 10/03/2024    Male hypogonadism  -     testosterone cypionate (DEPOTESTOTERONE CYPIONATE) 100 mg/mL injection; Inject 1 mL (100 mg total) into the muscle every 7 days.  Dispense: 10 mL; Refill: 1    Glucose intolerance (impaired glucose tolerance)  -     Comprehensive Metabolic Panel; Future; Expected date: 10/03/2024  -     CBC Without Differential; Future; Expected date: 10/03/2024    Immunization due  -     influenza (Flulaval, Fluzone, Fluarix) 45 mcg/0.5 mL IM vaccine (> or = 6 mo) 0.5 mL    H/O heart valve replacement with mechanical valve    Warfarin anticoagulation    Overweight (BMI 25.0-29.9)        Continue to work on maintain healthy weight, balanced diet. Avoid unhealthy habits: smoking/vaping, excessive alcohol intake.     Recommend diet exercise:  High protein, low fat, low carb diet - calories women under <1200 & men <1800, carbs <100 G, protein 50-60 G daily. Protein supplements to replace one meal.  Keep all beverages < 10 calories per serving. Keep snacks < 100 calories.   Recommend exercise 160 minutes per week, combo of cardio and weight/strength training.     Disclaimer: This note was partly generated using dictation software which may occasionally result in transcription errors  ____________________________________________________________________________________________________  Review of Systems:  Review of Systems      Negative     Objective:     Wt Readings from Last 3 Encounters:   10/03/24 98.5 kg (217 lb 4.2 oz)   04/09/24 102.1 kg (225 lb 1.4  "oz)   03/07/24 105.6 kg (232 lb 11.1 oz)     BP Readings from Last 3 Encounters:   10/03/24 115/68   04/09/24 (!) 100/54   03/07/24 (!) 100/58       Lab Results   Component Value Date    WBC 5.93 09/27/2024    HGB 16.6 09/27/2024    HCT 49.4 09/27/2024     09/27/2024     09/27/2024    K 4.3 09/27/2024     09/27/2024    ALT 17 09/27/2024    AST 23 09/27/2024    CO2 26 09/27/2024    CREATININE 1.1 09/27/2024    BUN 10 09/27/2024    PSA 0.44 09/27/2024    GLU 86 09/27/2024      Hemoglobin A1C   Date Value Ref Range Status   09/27/2024 4.8 4.0 - 5.6 % Final     Comment:     ADA Screening Guidelines:  5.7-6.4%  Consistent with prediabetes  >or=6.5%  Consistent with diabetes    High levels of fetal hemoglobin interfere with the HbA1C  assay. Heterozygous hemoglobin variants (HbS, HgC, etc)do  not significantly interfere with this assay.   However, presence of multiple variants may affect accuracy.     03/05/2024 5.1 4.0 - 5.6 % Final     Comment:     ADA Screening Guidelines:  5.7-6.4%  Consistent with prediabetes  >or=6.5%  Consistent with diabetes    High levels of fetal hemoglobin interfere with the HbA1C  assay. Heterozygous hemoglobin variants (HbS, HgC, etc)do  not significantly interfere with this assay.   However, presence of multiple variants may affect accuracy.     09/08/2022 5.4 4.0 - 5.6 % Final     Comment:     ADA Screening Guidelines:  5.7-6.4%  Consistent with prediabetes  >or=6.5%  Consistent with diabetes    High levels of fetal hemoglobin interfere with the HbA1C  assay. Heterozygous hemoglobin variants (HbS, HgC, etc)do  not significantly interfere with this assay.   However, presence of multiple variants may affect accuracy.        Lab Results   Component Value Date    TSH 1.345 04/21/2023    TSH 1.370 06/10/2022     No results found for: "FREET4"  Lab Results   Component Value Date    LDLCALC 77.4 09/27/2024    LDLCALC 92.2 12/08/2023    LDLCALC 85.8 04/21/2023     Lab Results " "  Component Value Date    TRIG 78 09/27/2024    TRIG 74 12/08/2023    TRIG 81 04/21/2023            Physical Exam      Constitutional:       Appearance: Normal appearance.   HENT:      Head: Normocephalic and atraumatic.   Eyes:      Extraocular Movements: Extraocular movements intact.      Pupils: Pupils are equal, round, and reactive to light.   Cardiovascular:      Rate and Rhythm: Normal rate.   Pulmonary:      Effort: Pulmonary effort is normal.   Neurological:      Mental Status: She is alert and oriented to person, place, and time.   Psychiatric:         Mood and Affect: Mood normal.     Medication List with Changes/Refills   New Medications    TESTOSTERONE CYPIONATE (DEPOTESTOTERONE CYPIONATE) 100 MG/ML INJECTION    Inject 1 mL (100 mg total) into the muscle every 7 days.   Current Medications    AMLODIPINE-OLMESARTAN (FLO) 5-40 MG PER TABLET    Take 1 tablet by mouth once daily.    ASPIRIN (ECOTRIN) 81 MG EC TABLET    Take 1 tablet (81 mg total) by mouth once daily.    CARVEDILOL (COREG) 6.25 MG TABLET    Take 1 tablet (6.25 mg total) by mouth 2 (two) times daily.    CHLORTHALIDONE (HYGROTEN) 25 MG TAB    Take 1 tablet (25 mg total) by mouth once daily.    ESCITALOPRAM OXALATE (LEXAPRO) 10 MG TABLET    TAKE 1 TABLET(10 MG) BY MOUTH EVERY DAY    ESOMEPRAZOLE (NEXIUM) 20 MG CAPSULE        INSULIN SYRINGE-NEEDLE U-100 (BD INSULIN SYRINGE) 1 ML 25 X 1" SYRG    Inject 1 each into the muscle once a week.    ROSUVASTATIN (CRESTOR) 10 MG TABLET    Take 1 tablet (10 mg total) by mouth once daily.    SYRINGE WITH NEEDLE (BD LUER-KLAUDIA SYRINGE) 3 ML 25 GAUGE X 1" SYRG    BD Luer-Klaudia Syringe 3 mL 25 gauge x 1"   ONE SYRINGE AS DIRECTED EVERY 7 DAYS    SYRINGE WITH NEEDLE 1 ML 25 GAUGE X 1" SYRG    Inject 1 Syringe as directed every 7 days. Disp 1 box    TESTOSTERONE CYPIONATE (DEPOTESTOTERONE CYPIONATE) 200 MG/ML INJECTION    ADMINISTER 0.5 ML(100 MG) IN THE MUSCLE EVERY 7 DAYS, THEN DISCARD REMAINDER AFTER EACH USE    " TIRZEPATIDE, WEIGHT LOSS, (ZEPBOUND) 5 MG/0.5 ML PNIJ    Inject 5 mg into the skin every 7 days.    TRAZODONE (DESYREL) 50 MG TABLET    Take 50 mg by mouth nightly as needed.    WARFARIN (COUMADIN) 5 MG TABLET    Take 1 tablet (5 mg total) by mouth Daily.    ZOLPIDEM (AMBIEN) 10 MG TAB    Take 1 tablet (10 mg total) by mouth nightly as needed (nightly prn).   Discontinued Medications    TIRZEPATIDE, WEIGHT LOSS, (ZEPBOUND) 2.5 MG/0.5 ML PNIJ    Inject 2.5 mg into the skin every 7 days.

## 2024-10-24 DIAGNOSIS — E66.3 OVERWEIGHT (BMI 25.0-29.9): ICD-10-CM

## 2024-10-24 DIAGNOSIS — E78.2 MIXED HYPERLIPIDEMIA: ICD-10-CM

## 2024-10-24 DIAGNOSIS — R73.02 GLUCOSE INTOLERANCE (IMPAIRED GLUCOSE TOLERANCE): ICD-10-CM

## 2024-10-24 DIAGNOSIS — I10 ESSENTIAL HYPERTENSION: ICD-10-CM

## 2024-10-24 RX ORDER — TIRZEPATIDE 5 MG/.5ML
5 INJECTION, SOLUTION SUBCUTANEOUS
Qty: 4 PEN | Refills: 4 | Status: CANCELLED | OUTPATIENT
Start: 2024-10-24

## 2024-10-25 DIAGNOSIS — E78.2 MIXED HYPERLIPIDEMIA: ICD-10-CM

## 2024-10-25 DIAGNOSIS — E66.3 OVERWEIGHT (BMI 25.0-29.9): ICD-10-CM

## 2024-10-25 DIAGNOSIS — I10 ESSENTIAL HYPERTENSION: ICD-10-CM

## 2024-10-25 DIAGNOSIS — R73.02 GLUCOSE INTOLERANCE (IMPAIRED GLUCOSE TOLERANCE): ICD-10-CM

## 2024-10-25 RX ORDER — TIRZEPATIDE 5 MG/.5ML
5 INJECTION, SOLUTION SUBCUTANEOUS
Qty: 4 PEN | Refills: 4 | Status: SHIPPED | OUTPATIENT
Start: 2024-10-25

## 2024-10-25 NOTE — TELEPHONE ENCOUNTER
No care due was identified.  [facility] Embedded Care Due Messages. Reference number: [id number].   10/24/2024 10:04:40 PM CDT

## 2024-10-25 NOTE — TELEPHONE ENCOUNTER
No care due was identified.  [facility] Embedded Care Due Messages. Reference number: [id number].   10/25/2024 12:55:48 PM CDT

## 2025-02-04 DIAGNOSIS — Z95.2 H/O HEART VALVE REPLACEMENT WITH MECHANICAL VALVE: ICD-10-CM

## 2025-02-04 DIAGNOSIS — F41.9 ANXIETY: ICD-10-CM

## 2025-02-04 RX ORDER — CARVEDILOL 6.25 MG/1
6.25 TABLET ORAL 2 TIMES DAILY
Qty: 180 TABLET | Refills: 2 | Status: SHIPPED | OUTPATIENT
Start: 2025-02-04

## 2025-02-04 RX ORDER — ESCITALOPRAM OXALATE 10 MG/1
10 TABLET ORAL DAILY
Qty: 90 TABLET | Refills: 3 | Status: SHIPPED | OUTPATIENT
Start: 2025-02-04

## 2025-02-04 NOTE — TELEPHONE ENCOUNTER
Care Due:                  Date            Visit Type   Department     Provider  --------------------------------------------------------------------------------                                SAME DAY -                              ESTABLISHED   [facility]  Last Visit: 10-      PATIENT      MEDICINE       Warner Macedo  Next Visit: None Scheduled  None         None Found                                                            Last  Test          Frequency    Reason                     Performed    Due Date  --------------------------------------------------------------------------------    HBA1C.......  6 months...  tirzepatide,.............  09- 03-    [facility] Embedded Care Due Messages. Reference number: [id number].   2/04/2025 9:57:02 AM CST

## 2025-02-13 ENCOUNTER — PATIENT MESSAGE (OUTPATIENT)
Dept: FAMILY MEDICINE | Facility: CLINIC | Age: 50
End: 2025-02-13
Payer: COMMERCIAL

## 2025-02-25 ENCOUNTER — PATIENT MESSAGE (OUTPATIENT)
Dept: FAMILY MEDICINE | Facility: CLINIC | Age: 50
End: 2025-02-25
Payer: COMMERCIAL

## 2025-03-12 ENCOUNTER — TELEPHONE (OUTPATIENT)
Dept: FAMILY MEDICINE | Facility: CLINIC | Age: 50
End: 2025-03-12
Payer: COMMERCIAL

## 2025-03-12 ENCOUNTER — PATIENT MESSAGE (OUTPATIENT)
Dept: FAMILY MEDICINE | Facility: CLINIC | Age: 50
End: 2025-03-12
Payer: COMMERCIAL

## 2025-03-12 NOTE — TELEPHONE ENCOUNTER
----- Message from Tiffani sent at 3/12/2025 10:22 AM CDT -----  Contact: [phone] Patient  .1MEDICALADVICE Patient is calling for Medical Advice regarding: Pt is calling in regards to the status of his testosterone cypionate (DEPOTESTOTERONE CYPIONATE) 100 mg/mL injection being filled. Please call and advise. Thank youHow long has patient had these symptoms:N/APharmacy name and phone#: [id number] [place] [phone] AT [facility]  [id number] [place] [id number]: [phone] Fax: [phone] wants a call back or thru [facility]:Comments:Please advise patient replies from provider may take up to 48 hours.

## 2025-04-07 ENCOUNTER — PATIENT MESSAGE (OUTPATIENT)
Dept: FAMILY MEDICINE | Facility: CLINIC | Age: 50
End: 2025-04-07
Payer: COMMERCIAL

## 2025-04-07 DIAGNOSIS — I10 ESSENTIAL HYPERTENSION: ICD-10-CM

## 2025-04-07 DIAGNOSIS — E78.2 MIXED HYPERLIPIDEMIA: ICD-10-CM

## 2025-04-07 DIAGNOSIS — E29.1 MALE HYPOGONADISM: ICD-10-CM

## 2025-04-07 DIAGNOSIS — E66.3 OVERWEIGHT (BMI 25.0-29.9): ICD-10-CM

## 2025-04-07 DIAGNOSIS — R73.02 GLUCOSE INTOLERANCE (IMPAIRED GLUCOSE TOLERANCE): ICD-10-CM

## 2025-04-07 RX ORDER — TIRZEPATIDE 5 MG/.5ML
5 INJECTION, SOLUTION SUBCUTANEOUS
Qty: 4 PEN | Refills: 4 | Status: CANCELLED | OUTPATIENT
Start: 2025-04-07

## 2025-04-07 RX ORDER — TESTOSTERONE CYPIONATE 200 MG/ML
INJECTION, SOLUTION INTRAMUSCULAR
Qty: 1 ML | Refills: 10 | OUTPATIENT
Start: 2025-04-07

## 2025-04-07 NOTE — TELEPHONE ENCOUNTER
Care Due:                  Date            Visit Type   Department     Provider  --------------------------------------------------------------------------------                                SAME DAY -                              ESTABLISHED   [facility]  Last Visit: 10-      PATIENT      MEDICINE       Warner Macedo  Next Visit: None Scheduled  None         None Found                                                            Last  Test          Frequency    Reason                     Performed    Due Date  --------------------------------------------------------------------------------    HBA1C.......  6 months...  tirzepatide,.............  09- 03-    Health [facility] Embedded Care Due Messages. Reference number: [id number].   4/07/2025 8:52:40 AM MARYT

## 2025-04-07 NOTE — TELEPHONE ENCOUNTER
No care due was identified.  [facility] Embedded Care Due Messages. Reference number: [id number].   4/07/2025 10:46:35 AM CDT

## 2025-04-21 ENCOUNTER — PATIENT MESSAGE (OUTPATIENT)
Dept: FAMILY MEDICINE | Facility: CLINIC | Age: 50
End: 2025-04-21
Payer: COMMERCIAL

## 2025-04-21 DIAGNOSIS — E29.1 MALE HYPOGONADISM: ICD-10-CM

## 2025-04-23 RX ORDER — TESTOSTERONE CYPIONATE 200 MG/ML
INJECTION, SOLUTION INTRAMUSCULAR
Qty: 1 ML | Refills: 10 | OUTPATIENT
Start: 2025-04-23

## 2025-04-23 NOTE — TELEPHONE ENCOUNTER
No care due was identified.  [facility] Embedded Care Due Messages. Reference number: [id number].   4/22/2025 9:57:18 PM CDT

## 2025-04-24 ENCOUNTER — LAB VISIT (OUTPATIENT)
Dept: LAB | Facility: HOSPITAL | Age: 50
End: 2025-04-24
Payer: COMMERCIAL

## 2025-04-24 DIAGNOSIS — R73.02 GLUCOSE INTOLERANCE (IMPAIRED GLUCOSE TOLERANCE): ICD-10-CM

## 2025-04-24 DIAGNOSIS — E78.2 MIXED HYPERLIPIDEMIA: ICD-10-CM

## 2025-04-24 DIAGNOSIS — Z00.00 WELLNESS EXAMINATION: ICD-10-CM

## 2025-04-24 LAB
ALBUMIN SERPL BCP-MCNC: 3.9 G/DL (ref 3.5–5.2)
ALP SERPL-CCNC: 50 UNIT/L (ref 40–150)
ALT SERPL W/O P-5'-P-CCNC: 33 UNIT/L (ref 10–44)
ANION GAP (OHS): 7 MMOL/L (ref 8–16)
AST SERPL-CCNC: 30 UNIT/L (ref 11–45)
BILIRUB SERPL-MCNC: 0.9 MG/DL (ref 0.1–1)
BUN SERPL-MCNC: 11 MG/DL (ref 6–20)
CALCIUM SERPL-MCNC: 8.6 MG/DL (ref 8.7–10.5)
CHLORIDE SERPL-SCNC: 106 MMOL/L (ref 95–110)
CHOLEST SERPL-MCNC: 171 MG/DL (ref 120–199)
CHOLEST/HDLC SERPL: 3.9 {RATIO} (ref 2–5)
CO2 SERPL-SCNC: 27 MMOL/L (ref 23–29)
CREAT SERPL-MCNC: 0.9 MG/DL (ref 0.5–1.4)
ERYTHROCYTE [DISTWIDTH] IN BLOOD BY AUTOMATED COUNT: 14 % (ref 11.5–14.5)
GFR SERPLBLD CREATININE-BSD FMLA CKD-EPI: >60 ML/MIN/1.73/M2
GLUCOSE SERPL-MCNC: 68 MG/DL (ref 70–110)
HCT VFR BLD AUTO: 46.8 % (ref 40–54)
HDLC SERPL-MCNC: 44 MG/DL (ref 40–75)
HDLC SERPL: 25.7 % (ref 20–50)
HGB BLD-MCNC: 15.7 GM/DL (ref 14–18)
LDLC SERPL CALC-MCNC: 97 MG/DL (ref 63–159)
MCH RBC QN AUTO: 30.2 PG (ref 27–31)
MCHC RBC AUTO-ENTMCNC: 33.5 G/DL (ref 32–36)
MCV RBC AUTO: 90 FL (ref 82–98)
NONHDLC SERPL-MCNC: 127 MG/DL
PLATELET # BLD AUTO: 190 K/UL (ref 150–450)
PMV BLD AUTO: 10.1 FL (ref 9.2–12.9)
POTASSIUM SERPL-SCNC: 4.2 MMOL/L (ref 3.5–5.1)
PROT SERPL-MCNC: 6.8 GM/DL (ref 6–8.4)
RBC # BLD AUTO: 5.2 M/UL (ref 4.6–6.2)
SODIUM SERPL-SCNC: 140 MMOL/L (ref 136–145)
TRIGL SERPL-MCNC: 150 MG/DL (ref 30–150)
WBC # BLD AUTO: 6.87 K/UL (ref 3.9–12.7)

## 2025-04-24 PROCEDURE — 80053 COMPREHEN METABOLIC PANEL: CPT

## 2025-04-24 PROCEDURE — 80061 LIPID PANEL: CPT

## 2025-04-24 PROCEDURE — 36415 COLL VENOUS BLD VENIPUNCTURE: CPT | Mod: PN

## 2025-04-24 PROCEDURE — 85027 COMPLETE CBC AUTOMATED: CPT

## 2025-04-25 ENCOUNTER — OFFICE VISIT (OUTPATIENT)
Dept: FAMILY MEDICINE | Facility: CLINIC | Age: 50
End: 2025-04-25
Payer: COMMERCIAL

## 2025-04-25 DIAGNOSIS — F41.9 ANXIETY: ICD-10-CM

## 2025-04-25 DIAGNOSIS — Z79.899 HIGH RISK MEDICATIONS (NOT ANTICOAGULANTS) LONG-TERM USE: ICD-10-CM

## 2025-04-25 DIAGNOSIS — R73.02 GLUCOSE INTOLERANCE (IMPAIRED GLUCOSE TOLERANCE): ICD-10-CM

## 2025-04-25 DIAGNOSIS — E78.2 MIXED HYPERLIPIDEMIA: ICD-10-CM

## 2025-04-25 DIAGNOSIS — Z00.00 WELLNESS EXAMINATION: Primary | ICD-10-CM

## 2025-04-25 DIAGNOSIS — Z12.5 SCREENING PSA (PROSTATE SPECIFIC ANTIGEN): ICD-10-CM

## 2025-04-25 DIAGNOSIS — I10 ESSENTIAL HYPERTENSION: ICD-10-CM

## 2025-04-25 DIAGNOSIS — E29.1 MALE HYPOGONADISM: ICD-10-CM

## 2025-04-25 DIAGNOSIS — E66.3 OVERWEIGHT (BMI 25.0-29.9): ICD-10-CM

## 2025-04-25 RX ORDER — TIRZEPATIDE 5 MG/.5ML
5 INJECTION, SOLUTION SUBCUTANEOUS
Qty: 4 PEN | Refills: 4 | Status: SHIPPED | OUTPATIENT
Start: 2025-04-25

## 2025-04-25 RX ORDER — AMLODIPINE BESYLATE AND OLMESARTAN MEDOXOMIL 5; 40 MG/1; MG/1
1 TABLET ORAL DAILY
Qty: 90 TABLET | Refills: 2 | Status: SHIPPED | OUTPATIENT
Start: 2025-04-25 | End: 2026-04-25

## 2025-04-25 RX ORDER — TESTOSTERONE CYPIONATE 1000 MG/10ML
100 INJECTION, SOLUTION INTRAMUSCULAR
Qty: 12 ML | Refills: 1 | Status: SHIPPED | OUTPATIENT
Start: 2025-04-25 | End: 2025-10-24

## 2025-04-25 NOTE — PROGRESS NOTES
The patient location is:  [place]  The chief complaint leading to consultation is: f/u labs      Visit type:  Audiovisual    Face to Face time with patient: 21 minutes of total time spent on the encounter, which includes face to face time and non-face to face time preparing to see the patient (eg, review of tests), Obtaining and/or reviewing separately obtained history, Documenting clinical information in the electronic or other health record, Independently interpreting results (not separately reported) and communicating results to the patient/family/caregiver, or Care coordination (not separately reported).       Each patient to whom he or she provides medical services by telemedicine is:  (1) informed of the relationship between the physician and patient and the respective role of any other health care provider with respect to management of the patient; and (2) notified that he or she may decline to receive medical services by telemedicine and may withdraw from such care at any time.    Subjective:       Patient ID: Alden Ledbetter is a 50 y.o. male.    History of Present Illness                Chief Complaint: Follow-up    HPI    PSA: 0.4 (9/24    Colonoscopy: Dr Arzate 2022 normal   Immunizations: Flu: Y Tdap: 2018     Smoker: never      F/u   Labs      Pharm told him could using multi dose vial 10 ml couldn't use after 1 month      HTN: controlled Rx Tony 5/40. Off was having low bp Coreg 6.25 bid,   Off Chlorthalidone 25   Prev Losartan 100, Hctz 25, Norvasc 10 / Off diltiazem 120 twice daily.               SE Metoprolol  fatigue      Bicuspid AV w moderate AR, ascending aortic aneurysm:   Rx Warfarin.  s/p Aortic root replacement (Bentall procedure) w mechanical valve.  Monitors INR at home.    Mgmt Cardio Dr Carlton         Mixed HLD: controlled Rx Crestor 10      Male hypogonadism: controlled dx approx 2018 // Rx Testosterone injection 100 mg / weekly.    Improved energy, less fatigue. Previously  Axiron 2 // T 918 1 day after shot.  (Prev starting low levels labs 284 & 3.4 and 194 & T 5.7)      Anxiety:  controlled Rx Lexapro 10, Med THC   Trigger medical heart valve replacement.       GERD: controlled Rx Nexium 20 OTC     Glucose intolerance:  Elevated fasting glucose. G 102 improved with weight loss       Insomnia: cyclic Rx Ambien 10      Metabolic syndrome 29 / 238, April 24: 27/225, Oct 24; 26/217  // Rx Zepbound 5       Assessment & Plan:  1. Male hypogonadism  - testosterone cypionate (DEPOTESTOTERONE CYPIONATE) 100 mg/mL injection; Inject 1 mL (100 mg total) into the muscle every 7 days.  Dispense: 12 mL; Refill: 1     Male hypogonadism  -     testosterone cypionate (DEPOTESTOTERONE CYPIONATE) 100 mg/mL injection; Inject 1 mL (100 mg total) into the muscle every 7 days.  Dispense: 12 mL; Refill: 1          Recommend diet exercise:  High protein, low fat, low carb diet - calories women under <1200 & men <1800, carbs <100 G, protein 50-60 G daily. Protein supplements to replace one meal.  Keep all beverages < 10 calories per serving. Keep snacks < 100 calories.   Recommend exercise 160 minutes per week, combo of cardio and weight/strength training.     Visit today included increased complexity associated with the care of the episodic problem addressed and managing the longitudinal care of the patient due to the serious and/or complex managed problem(s). HTN    Disclaimer: This note was partly generated using dictation /Ambien listening transcription software which may occasionally result in transcription errors      Review of Systems:  Review of Systems   Constitutional:  Negative for activity change and unexpected weight change.   HENT:  Negative for hearing loss, rhinorrhea and trouble swallowing.    Eyes:  Negative for discharge and visual disturbance.   Respiratory:  Negative for chest tightness and wheezing.    Cardiovascular:  Negative for chest pain and palpitations.   Gastrointestinal:   Negative for blood in stool, constipation, diarrhea and vomiting.   Endocrine: Negative for polydipsia and polyuria.   Genitourinary:  Negative for difficulty urinating, hematuria and urgency.   Musculoskeletal:  Negative for arthralgias, joint swelling and neck pain.   Neurological:  Negative for weakness and headaches.   Psychiatric/Behavioral:  Negative for confusion and dysphoric mood.          Objective:     Wt Readings from Last 3 Encounters:   10/03/24 98.5 kg (217 lb 4.2 oz)   04/09/24 102.1 kg (225 lb 1.4 oz)   03/07/24 105.6 kg (232 lb 11.1 oz)     Temp Readings from Last 3 Encounters:   09/16/22 98.2 °F (36.8 °C) (Oral)   08/24/22 98.1 °F (36.7 °C) (Temporal)   08/09/22 98.3 °F (36.8 °C) (Oral)     BP Readings from Last 3 Encounters:   10/03/24 115/68   04/09/24 (!) 100/54   03/07/24 (!) 100/58     Pulse Readings from Last 3 Encounters:   10/03/24 71   04/09/24 71   03/07/24 (!) 58      Lab Results   Component Value Date    WBC 6.87 04/24/2025    HGB 15.7 04/24/2025    HCT 46.8 04/24/2025     04/24/2025    CHOL 171 04/24/2025    TRIG 150 04/24/2025    HDL 44 04/24/2025    ALT 33 04/24/2025    AST 30 04/24/2025     04/24/2025    K 4.2 04/24/2025     04/24/2025    CREATININE 0.9 04/24/2025    BUN 11 04/24/2025    CO2 27 04/24/2025    TSH 1.345 04/21/2023    PSA 0.44 09/27/2024    INR 2.0 (H) 09/16/2022    HGBA1C 4.8 09/27/2024                  Physical Exam      Medication List with Changes/Refills   New Medications    TESTOSTERONE CYPIONATE (DEPOTESTOTERONE CYPIONATE) 100 MG/ML INJECTION    Inject 1 mL (100 mg total) into the muscle every 7 days.   Current Medications    AMLODIPINE-OLMESARTAN (FLO) 5-40 MG PER TABLET    Take 1 tablet by mouth once daily.    ASPIRIN (ECOTRIN) 81 MG EC TABLET    Take 1 tablet (81 mg total) by mouth once daily.    CARVEDILOL (COREG) 6.25 MG TABLET    Take 1 tablet (6.25 mg total) by mouth 2 (two) times daily.    CHLORTHALIDONE (HYGROTEN) 25 MG TAB    Take  "1 tablet (25 mg total) by mouth once daily.    ESCITALOPRAM OXALATE (LEXAPRO) 10 MG TABLET    Take 1 tablet (10 mg total) by mouth once daily.    ESOMEPRAZOLE (NEXIUM) 20 MG CAPSULE        INSULIN SYRINGE-NEEDLE U-100 (BD INSULIN SYRINGE) 1 ML 25 X 1" SYRG    Inject 1 each into the muscle once a week.    ROSUVASTATIN (CRESTOR) 10 MG TABLET    Take 1 tablet (10 mg total) by mouth once daily.    SYRINGE WITH NEEDLE (BD LUER-KLAUDIA SYRINGE) 3 ML 25 GAUGE X 1" SYRG    BD Luer-Klaudia Syringe 3 mL 25 gauge x 1"   ONE SYRINGE AS DIRECTED EVERY 7 DAYS    SYRINGE WITH NEEDLE 1 ML 25 GAUGE X 1" SYRG    Inject 1 Syringe as directed every 7 days. Disp 1 box    TIRZEPATIDE, WEIGHT LOSS, (ZEPBOUND) 5 MG/0.5 ML PNIJ    Inject 5 mg into the skin every 7 days.    TRAZODONE (DESYREL) 50 MG TABLET    Take 50 mg by mouth nightly as needed.    WARFARIN (COUMADIN) 5 MG TABLET    Take 1 tablet (5 mg total) by mouth Daily.    ZOLPIDEM (AMBIEN) 10 MG TAB    Take 1 tablet (10 mg total) by mouth nightly as needed (nightly prn). 1   Discontinued Medications    TESTOSTERONE CYPIONATE (DEPOTESTOTERONE CYPIONATE) 200 MG/ML INJECTION    ADMINISTER 0.5 ML(100 MG) IN THE MUSCLE EVERY 7 DAYS, THEN DISCARD REMAINDER AFTER EACH USE           "

## 2025-04-29 ENCOUNTER — TELEPHONE (OUTPATIENT)
Dept: FAMILY MEDICINE | Facility: CLINIC | Age: 50
End: 2025-04-29
Payer: COMMERCIAL

## 2025-04-29 NOTE — TELEPHONE ENCOUNTER
----- Message from Kavya Mora sent at 4/25/2025  3:26 PM CDT -----  Type:  Provider Notification of Prior Authorization Who Called: [employer] Company:[employer] Brief Description of what this is regarding:CLARIFY QUANTITY ON testosterone cypionate (DEPOTESTOTERONE CYPIONATE) 100 mg/mL injection Best Call Back Number:[id number] - [place], [place] - [address] [place] [address] AT [facility]  [id number] [place] [id number]: [phone] Fax: [phone] Authorization #: NA Additional Information: CALL TO CLARIFY WITH THE PHARMACIST;      Thanks!   From: Ruthie Morton  To: Concepcion Cuevas  Sent: 5/26/2022 7:04 PM CDT  Subject: Results of my esophageal test    What is the result ofmy last test?  At [facility]’s I was told you’re gonna contact me. I looked for the result in my [employer] page but it isn’t there.  Anyway, please let me know.    Ruthie Morton

## 2025-05-22 DIAGNOSIS — G47.00 INSOMNIA, UNSPECIFIED TYPE: Primary | ICD-10-CM

## 2025-05-22 NOTE — TELEPHONE ENCOUNTER
No care due was identified.  [facility] Embedded Care Due Messages. Reference number: [id number].   5/22/2025 11:30:26 AM CDT

## 2025-05-22 NOTE — TELEPHONE ENCOUNTER
Please approve for  zolpidem (AMBIEN) 10 mg Tab   Last OV 04/25/25  Last refill date 10/03/24  Next appt na

## 2025-05-23 RX ORDER — ZOLPIDEM TARTRATE 10 MG/1
10 TABLET ORAL NIGHTLY PRN
Qty: 90 TABLET | Refills: 0 | Status: SHIPPED | OUTPATIENT
Start: 2025-05-23

## (undated) DEVICE — GAUZE SPONGE 4X4 12PLY

## (undated) DEVICE — TRAY HEART

## (undated) DEVICE — RETRACTOR OCTOBASE INSERT HOLD

## (undated) DEVICE — CLOSURE SKIN STERI STRIP 1/2X4

## (undated) DEVICE — CONTAINER SPECIMEN STRL 4OZ

## (undated) DEVICE — TIP YANKAUERS BULB NO VENT

## (undated) DEVICE — KIT MICROINTRO 4F .018X40X7CM

## (undated) DEVICE — SPIKE CONTRAST CONTROLLER

## (undated) DEVICE — DRESSING MEPILEX BORDR AG 4X12

## (undated) DEVICE — BLADE SURG #15 CARBON STEEL

## (undated) DEVICE — SUT PROLENE 4-0 RB-1 BL MO

## (undated) DEVICE — COVER SET UP STRL 54X54 20/BX

## (undated) DEVICE — SUT 6 18IN STEEL MONO CCS

## (undated) DEVICE — GOWN POLY REINF BRTH SLV XL

## (undated) DEVICE — DRAIN CHEST DRY SUCTION

## (undated) DEVICE — LOOP VESSEL BLUE MAXI

## (undated) DEVICE — LEAFLET TESTER

## (undated) DEVICE — ELECTRODE REM PLYHSV RETURN 9

## (undated) DEVICE — DRAPE SLUSH WARMER WITH DISC

## (undated) DEVICE — DRAPE INCISE IOBAN 2 23X33IN

## (undated) DEVICE — SUT PROLENE 5-0 BL C-1 4-24

## (undated) DEVICE — OMNIPAQUE 350 200ML

## (undated) DEVICE — SUT SILK 2-0 SH 18IN BLACK

## (undated) DEVICE — NDL BOX COUNTER

## (undated) DEVICE — SUT 2/0 30IN ETHIBOND

## (undated) DEVICE — SUT SILK BLK BR. 2 2-60

## (undated) DEVICE — CATH AL 3.0 6FR

## (undated) DEVICE — BLADE SAW STERNAL 5/BX

## (undated) DEVICE — SUT 2/0 30IN SILK BLK BRAI

## (undated) DEVICE — TOWEL OR DISP STRL BLUE 4/PK

## (undated) DEVICE — CATH DXTERITY AL-I 100CM 6FR

## (undated) DEVICE — GUIDEWIRE SUPRA CORE 035 190CM

## (undated) DEVICE — ADHESIVE MASTISOL VIAL 48/BX

## (undated) DEVICE — TRANSDUCER ADULT DISP

## (undated) DEVICE — SUT VICRYL BR 1 GEN 27 CT-1

## (undated) DEVICE — FOGERTY SOFT JAW DISP 2/PK

## (undated) DEVICE — CLAMP LONG JAW ISOLATOR OPEN

## (undated) DEVICE — SHEET CARDIOVASCULAR SPLIT

## (undated) DEVICE — CATH JL5 4FR INFINITY

## (undated) DEVICE — CANNULA RETROGRADE CARDIOPLEG

## (undated) DEVICE — KIT URINARY CATH URINE METER

## (undated) DEVICE — KIT CUSTOM MANIFOLD

## (undated) DEVICE — TAPE SURG MEDIPORE 6X72IN

## (undated) DEVICE — GLOVE BIOGEL PI MICRO SZ 7.5

## (undated) DEVICE — SUT 2-0 VICRYL / CT-1

## (undated) DEVICE — SEE MEDLINE ITEM 156894

## (undated) DEVICE — SHEATH INTRODUCER 6FR 11CM

## (undated) DEVICE — SUT PROLENE 4-0 SH BLU 36IN

## (undated) DEVICE — DEVICE PERCLOSE SUT CLSR 6FR

## (undated) DEVICE — CATH INFINITI JUDKINS JR4

## (undated) DEVICE — SET DECANTER MEDICHOICE

## (undated) DEVICE — PROTECTION STATION PLUS

## (undated) DEVICE — SPONGE GAUZE 16PLY 4X4

## (undated) DEVICE — DRESSING ADH ISLAND 3.6 X 14

## (undated) DEVICE — INSERTS STEALTH FIBRA SIZE 5

## (undated) DEVICE — DRESSING AQUACEL SACRAL 9 X 9

## (undated) DEVICE — ADAPTER PERFUSION 1/4

## (undated) DEVICE — SOL 9P NACL IRR PIC IL